# Patient Record
Sex: FEMALE | Race: WHITE | NOT HISPANIC OR LATINO | Employment: OTHER | URBAN - METROPOLITAN AREA
[De-identification: names, ages, dates, MRNs, and addresses within clinical notes are randomized per-mention and may not be internally consistent; named-entity substitution may affect disease eponyms.]

---

## 2017-01-03 RX ORDER — FENOFIBRATE 160 MG/1
160 TABLET ORAL DAILY
Qty: 90 TABLET | Refills: 3 | Status: SHIPPED | OUTPATIENT
Start: 2017-01-03 | End: 2018-01-24 | Stop reason: SDUPTHER

## 2017-01-05 ENCOUNTER — APPOINTMENT (OUTPATIENT)
Dept: MAMMOGRAPHY | Facility: HOSPITAL | Age: 72
End: 2017-01-05

## 2017-01-12 ENCOUNTER — HOSPITAL ENCOUNTER (OUTPATIENT)
Dept: MAMMOGRAPHY | Facility: HOSPITAL | Age: 72
Discharge: HOME | End: 2017-01-12
Admitting: FAMILY MEDICINE

## 2017-01-12 ENCOUNTER — TELEPHONE (OUTPATIENT)
Dept: FAMILY MEDICINE CLINIC | Facility: CLINIC | Age: 72
End: 2017-01-12

## 2017-01-12 DIAGNOSIS — E03.9 ADULT HYPOTHYROIDISM: Primary | ICD-10-CM

## 2017-01-12 DIAGNOSIS — Z12.31 VISIT FOR SCREENING MAMMOGRAM: ICD-10-CM

## 2017-01-12 PROCEDURE — G0202 SCR MAMMO BI INCL CAD: HCPCS

## 2017-01-12 NOTE — TELEPHONE ENCOUNTER
Pt called and said that you wanted her to come in to get her tsh rechecked cause it was elevated last time. Just let me know when you put order in and ill call pt. Thank you

## 2017-01-13 ENCOUNTER — RESULTS ENCOUNTER (OUTPATIENT)
Dept: FAMILY MEDICINE CLINIC | Facility: CLINIC | Age: 72
End: 2017-01-13

## 2017-01-13 DIAGNOSIS — E03.9 ADULT HYPOTHYROIDISM: ICD-10-CM

## 2017-01-18 LAB — TSH SERPL DL<=0.005 MIU/L-ACNC: 1.31 MIU/ML (ref 0.27–4.2)

## 2017-01-23 DIAGNOSIS — F41.9 ANXIETY: ICD-10-CM

## 2017-01-23 RX ORDER — ALPRAZOLAM 0.5 MG/1
TABLET ORAL
Qty: 90 TABLET | Refills: 0 | Status: SHIPPED | OUTPATIENT
Start: 2017-01-23 | End: 2017-02-22 | Stop reason: SDUPTHER

## 2017-02-22 DIAGNOSIS — F41.9 ANXIETY: ICD-10-CM

## 2017-02-22 DIAGNOSIS — F51.01 PRIMARY INSOMNIA: ICD-10-CM

## 2017-02-22 RX ORDER — ALPRAZOLAM 0.5 MG/1
TABLET ORAL
Qty: 90 TABLET | Refills: 0 | OUTPATIENT
Start: 2017-02-22 | End: 2017-03-23 | Stop reason: SDUPTHER

## 2017-02-22 RX ORDER — ZOLPIDEM TARTRATE 10 MG/1
TABLET ORAL
Qty: 90 TABLET | Refills: 0 | OUTPATIENT
Start: 2017-02-22 | End: 2017-05-22 | Stop reason: SDUPTHER

## 2017-03-06 DIAGNOSIS — E03.9 ADULT HYPOTHYROIDISM: ICD-10-CM

## 2017-03-06 RX ORDER — LEVOTHYROXINE SODIUM 0.07 MG/1
75 TABLET ORAL DAILY
Qty: 90 TABLET | Refills: 3 | Status: SHIPPED | OUTPATIENT
Start: 2017-03-06 | End: 2018-03-20 | Stop reason: SDUPTHER

## 2017-03-10 RX ORDER — ESTRADIOL 0.5 MG/1
TABLET ORAL
Qty: 90 TABLET | Refills: 0 | Status: SHIPPED | OUTPATIENT
Start: 2017-03-10 | End: 2017-06-17 | Stop reason: SDUPTHER

## 2017-03-23 DIAGNOSIS — F41.9 ANXIETY: ICD-10-CM

## 2017-03-23 RX ORDER — ALPRAZOLAM 0.5 MG/1
TABLET ORAL
Qty: 90 TABLET | Refills: 0 | OUTPATIENT
Start: 2017-03-23 | End: 2017-04-21 | Stop reason: SDUPTHER

## 2017-03-29 RX ORDER — PANTOPRAZOLE SODIUM 40 MG/1
40 TABLET, DELAYED RELEASE ORAL 2 TIMES DAILY
Qty: 180 TABLET | Refills: 0 | Status: SHIPPED | OUTPATIENT
Start: 2017-03-29 | End: 2017-07-06 | Stop reason: SDUPTHER

## 2017-04-21 DIAGNOSIS — F41.9 ANXIETY: ICD-10-CM

## 2017-04-21 RX ORDER — ALPRAZOLAM 0.5 MG/1
TABLET ORAL
Qty: 90 TABLET | Refills: 0 | OUTPATIENT
Start: 2017-04-21 | End: 2017-05-22 | Stop reason: SDUPTHER

## 2017-05-22 DIAGNOSIS — F41.9 ANXIETY: ICD-10-CM

## 2017-05-22 DIAGNOSIS — F51.01 PRIMARY INSOMNIA: ICD-10-CM

## 2017-05-22 RX ORDER — ALPRAZOLAM 0.5 MG/1
TABLET ORAL
Qty: 90 TABLET | Refills: 0 | OUTPATIENT
Start: 2017-05-22 | End: 2017-06-22 | Stop reason: SDUPTHER

## 2017-05-22 RX ORDER — ZOLPIDEM TARTRATE 10 MG/1
TABLET ORAL
Qty: 90 TABLET | Refills: 0 | OUTPATIENT
Start: 2017-05-22 | End: 2017-08-21 | Stop reason: SDUPTHER

## 2017-06-19 RX ORDER — ESTRADIOL 0.5 MG/1
TABLET ORAL
Qty: 90 TABLET | Refills: 1 | Status: SHIPPED | OUTPATIENT
Start: 2017-06-19 | End: 2017-11-27 | Stop reason: SDUPTHER

## 2017-06-22 ENCOUNTER — RESULTS ENCOUNTER (OUTPATIENT)
Dept: FAMILY MEDICINE CLINIC | Facility: CLINIC | Age: 72
End: 2017-06-22

## 2017-06-22 ENCOUNTER — OFFICE VISIT (OUTPATIENT)
Dept: FAMILY MEDICINE CLINIC | Facility: CLINIC | Age: 72
End: 2017-06-22

## 2017-06-22 VITALS
WEIGHT: 128 LBS | BODY MASS INDEX: 23.55 KG/M2 | SYSTOLIC BLOOD PRESSURE: 110 MMHG | TEMPERATURE: 97.4 F | HEART RATE: 67 BPM | DIASTOLIC BLOOD PRESSURE: 65 MMHG | OXYGEN SATURATION: 97 % | HEIGHT: 62 IN

## 2017-06-22 DIAGNOSIS — E03.9 ADULT HYPOTHYROIDISM: ICD-10-CM

## 2017-06-22 DIAGNOSIS — E78.5 HYPERLIPIDEMIA, UNSPECIFIED HYPERLIPIDEMIA TYPE: Primary | ICD-10-CM

## 2017-06-22 DIAGNOSIS — F41.9 ANXIETY: ICD-10-CM

## 2017-06-22 DIAGNOSIS — E55.9 VITAMIN D DEFICIENCY: ICD-10-CM

## 2017-06-22 DIAGNOSIS — Z12.11 COLON CANCER SCREENING: ICD-10-CM

## 2017-06-22 LAB
25(OH)D3+25(OH)D2 SERPL-MCNC: 30.7 NG/ML (ref 30–100)
ALBUMIN SERPL-MCNC: 4.6 G/DL (ref 3.5–5.2)
ALBUMIN/GLOB SERPL: 2.1 G/DL
ALP SERPL-CCNC: 29 U/L (ref 39–117)
ALT SERPL-CCNC: 15 U/L (ref 1–33)
AST SERPL-CCNC: 20 U/L (ref 1–32)
BILIRUB SERPL-MCNC: 0.4 MG/DL (ref 0.1–1.2)
BUN SERPL-MCNC: 24 MG/DL (ref 8–23)
BUN/CREAT SERPL: 26.4 (ref 7–25)
CALCIUM SERPL-MCNC: 10.3 MG/DL (ref 8.6–10.5)
CHLORIDE SERPL-SCNC: 100 MMOL/L (ref 98–107)
CHOLEST SERPL-MCNC: 135 MG/DL (ref 0–200)
CO2 SERPL-SCNC: 24.2 MMOL/L (ref 22–29)
CREAT SERPL-MCNC: 0.91 MG/DL (ref 0.57–1)
GLOBULIN SER CALC-MCNC: 2.2 GM/DL
GLUCOSE SERPL-MCNC: 81 MG/DL (ref 65–99)
HDLC SERPL-MCNC: 81 MG/DL (ref 40–60)
LDLC SERPL CALC-MCNC: 39 MG/DL (ref 0–100)
POTASSIUM SERPL-SCNC: 4.5 MMOL/L (ref 3.5–5.2)
PROT SERPL-MCNC: 6.8 G/DL (ref 6–8.5)
SODIUM SERPL-SCNC: 139 MMOL/L (ref 136–145)
TRIGL SERPL-MCNC: 73 MG/DL (ref 0–150)
TSH SERPL DL<=0.005 MIU/L-ACNC: 1.18 MIU/ML (ref 0.27–4.2)
VLDLC SERPL CALC-MCNC: 14.6 MG/DL (ref 5–40)

## 2017-06-22 PROCEDURE — 99213 OFFICE O/P EST LOW 20 MIN: CPT | Performed by: FAMILY MEDICINE

## 2017-06-22 RX ORDER — ALPRAZOLAM 0.5 MG/1
TABLET ORAL
Qty: 90 TABLET | Refills: 0 | OUTPATIENT
Start: 2017-06-22 | End: 2017-07-21 | Stop reason: SDUPTHER

## 2017-06-22 NOTE — PROGRESS NOTES
Subjective   Lou Shaffer is a 71 y.o. female. CC: hypertension    History of Present Illness   Lou is a 71 year old female who comes in today for blood pressure check.  Her insurance is questioning the use of Estradiol because it is a high risk medication.  Discussed potential adverse side effects including cancer, stroke, blood clots.  She states that she takes it for hot flashes.  Agreeable to try getting off of it.  Advised to start weaning off of it by going to every other day and then every third day.  She is due to have her labs checked and is fasting so will do those today while she is here.  Would like to do the Cologuard testing for colon cancer screening.  Requests refill of Xanax which she takes for chronic anxiety.        The following portions of the patient's history were reviewed and updated as appropriate: allergies, current medications, past medical history, past social history, past surgical history and problem list.    Review of Systems   Constitutional: Negative.  Negative for fatigue and unexpected weight change.   HENT: Negative.  Negative for congestion, sinus pressure and trouble swallowing.    Respiratory: Negative.  Negative for cough, shortness of breath and wheezing.    Cardiovascular: Negative.  Negative for chest pain, palpitations and leg swelling.   Gastrointestinal: Negative.  Negative for abdominal pain, blood in stool, constipation, diarrhea, nausea and vomiting.   Genitourinary: Negative.  Negative for difficulty urinating and hematuria.   Musculoskeletal: Negative.  Negative for arthralgias and back pain.   Skin: Negative.  Negative for rash.   Neurological: Negative.  Negative for dizziness, light-headedness and headaches.   Psychiatric/Behavioral: Negative for dysphoric mood and sleep disturbance. The patient is nervous/anxious.        Objective   Physical Exam   Constitutional: She is oriented to person, place, and time. She appears well-developed and  well-nourished.   Neck: No thyromegaly present.   Cardiovascular: Normal rate, regular rhythm and normal heart sounds.    No murmur heard.  Pulmonary/Chest: Effort normal and breath sounds normal. No respiratory distress.   Lymphadenopathy:     She has no cervical adenopathy.   Neurological: She is alert and oriented to person, place, and time.   Skin: Skin is warm and dry. No rash noted.   Psychiatric: She has a normal mood and affect. Her behavior is normal.   Nursing note and vitals reviewed.    Vitals:    06/22/17 1104   BP: 110/65   Pulse: 67   Temp: 97.4 °F (36.3 °C)   SpO2: 97%     Assessment/Plan   Problems Addressed this Visit        Cardiovascular and Mediastinum    Hyperlipidemia - Primary    Relevant Orders    Comprehensive metabolic panel    Lipid panel       Digestive    Vitamin D deficiency    Relevant Orders    Vitamin D 25 Hydroxy       Endocrine    Adult hypothyroidism    Relevant Orders    TSH       Other    Anxiety      Other Visit Diagnoses     Colon cancer screening        Relevant Orders    Cologuard        Will call in the [facility] refill.    The patient has read and signed the [facility] griddig Substance Contract.  I will continue to see patient for regular follow up appointments.  They are well controlled on their medication.  IVORY is updated every 3 months. The patient is aware of the potential for addiction and dependence.    The patient has read and signed the [facility] Controlled Substance Contract.  I will continue to see patient for regular follow up appointments.  They are well controlled on their medication.  IVORY is updated every 3 months. The patient is aware of the potential for addiction. The patient has read and signed the [facility] Controlled Substance Contract.  I will continue to see patient for regular follow up appointments.  They are well controlled on their medication.  IVORY is updated every 3 months. The patient is aware of the potential for  addiction and dependence.on and dependence.

## 2017-07-06 RX ORDER — PANTOPRAZOLE SODIUM 40 MG/1
TABLET, DELAYED RELEASE ORAL
Qty: 180 TABLET | Refills: 3 | Status: SHIPPED | OUTPATIENT
Start: 2017-07-06 | End: 2018-08-10 | Stop reason: SDUPTHER

## 2017-07-21 DIAGNOSIS — F41.9 ANXIETY: ICD-10-CM

## 2017-07-21 RX ORDER — ALPRAZOLAM 0.5 MG/1
0.5 TABLET ORAL 3 TIMES DAILY PRN
Qty: 90 TABLET | Refills: 0 | OUTPATIENT
Start: 2017-07-21 | End: 2017-08-23 | Stop reason: SDUPTHER

## 2017-07-24 DIAGNOSIS — Z12.11 COLON CANCER SCREENING: Primary | ICD-10-CM

## 2017-08-09 DIAGNOSIS — I10 ESSENTIAL HYPERTENSION: ICD-10-CM

## 2017-08-09 RX ORDER — BENAZEPRIL HYDROCHLORIDE AND HYDROCHLOROTHIAZIDE 5; 6.25 MG/1; MG/1
TABLET ORAL
Qty: 90 TABLET | Refills: 2 | Status: SHIPPED | OUTPATIENT
Start: 2017-08-09 | End: 2018-06-28 | Stop reason: SDUPTHER

## 2017-08-21 DIAGNOSIS — F51.01 PRIMARY INSOMNIA: ICD-10-CM

## 2017-08-21 RX ORDER — ZOLPIDEM TARTRATE 10 MG/1
10 TABLET ORAL NIGHTLY PRN
Qty: 90 TABLET | Refills: 0 | OUTPATIENT
Start: 2017-08-21 | End: 2017-11-20 | Stop reason: SDUPTHER

## 2017-08-23 ENCOUNTER — TELEPHONE (OUTPATIENT)
Dept: FAMILY MEDICINE CLINIC | Facility: CLINIC | Age: 72
End: 2017-08-23

## 2017-08-23 DIAGNOSIS — F41.9 ANXIETY: ICD-10-CM

## 2017-08-23 RX ORDER — ALPRAZOLAM 0.5 MG/1
0.5 TABLET ORAL 3 TIMES DAILY PRN
Qty: 90 TABLET | Refills: 0 | OUTPATIENT
Start: 2017-08-23 | End: 2018-01-03 | Stop reason: SDUPTHER

## 2017-08-23 NOTE — TELEPHONE ENCOUNTER
Ok---ignore other message about sending the medication in and give same directions for weaning off

## 2017-08-23 NOTE — TELEPHONE ENCOUNTER
Informed pt. Pt said that she will try the buspar. Does she still need to be wean off the xanax then go to the buspar?

## 2017-08-23 NOTE — TELEPHONE ENCOUNTER
Please call her and let her know that I did refill her Xanax.  However we need to discuss this medication.  Please tell her that advisory has come out against patients taking this medication multiple times a day every day.  We are going to need to wean her off of it or refer her to psychiatry for continued treatment of anxiety.  Also I will be retiring and it is very doubtful that another primary care doctor will continue to prescribe this medication.    I can give directions on how to wean or refer.  If wants something else for anxiety the only thing I can prescribe is Buspar

## 2017-08-23 NOTE — TELEPHONE ENCOUNTER
Yes she will.  Go ahead and cut down to two a day at this time and do that for 7 days.  She can then cut down to one a day for 2 weeks and then go to every other day.  Can go ahead and start on the Buspar now so it will have kicked in by the time she is off the Xanax.  Send back to me after you talk to her and I will send it in

## 2017-11-20 DIAGNOSIS — F51.01 PRIMARY INSOMNIA: ICD-10-CM

## 2017-11-20 RX ORDER — ZOLPIDEM TARTRATE 10 MG/1
10 TABLET ORAL NIGHTLY PRN
Qty: 90 TABLET | Refills: 0 | OUTPATIENT
Start: 2017-11-20 | End: 2018-05-18 | Stop reason: SDUPTHER

## 2017-11-27 ENCOUNTER — TELEPHONE (OUTPATIENT)
Dept: FAMILY MEDICINE CLINIC | Facility: CLINIC | Age: 72
End: 2017-11-27

## 2017-11-27 RX ORDER — ESTRADIOL 0.5 MG/1
0.5 TABLET ORAL DAILY
Qty: 90 TABLET | Refills: 0 | Status: SHIPPED | OUTPATIENT
Start: 2017-11-27 | End: 2018-01-03

## 2017-11-27 NOTE — TELEPHONE ENCOUNTER
Pt called wanting to know if there is anything other than estradiol (insurance wont cover) that pt can take for hot flashes?

## 2017-11-27 NOTE — TELEPHONE ENCOUNTER
She could try OTC supplements that some patients feel are helpful.  She might ask her pharmacist about supplements.

## 2018-01-03 ENCOUNTER — OFFICE VISIT (OUTPATIENT)
Dept: FAMILY MEDICINE CLINIC | Facility: CLINIC | Age: 73
End: 2018-01-03

## 2018-01-03 VITALS
HEIGHT: 62 IN | SYSTOLIC BLOOD PRESSURE: 112 MMHG | HEART RATE: 68 BPM | DIASTOLIC BLOOD PRESSURE: 70 MMHG | WEIGHT: 128 LBS | TEMPERATURE: 97.2 F | OXYGEN SATURATION: 99 % | BODY MASS INDEX: 23.55 KG/M2

## 2018-01-03 DIAGNOSIS — R23.2 HOT FLASHES: ICD-10-CM

## 2018-01-03 DIAGNOSIS — E03.9 ADULT HYPOTHYROIDISM: ICD-10-CM

## 2018-01-03 DIAGNOSIS — Z12.11 SCREENING FOR COLON CANCER: ICD-10-CM

## 2018-01-03 DIAGNOSIS — F41.9 ANXIETY: Primary | ICD-10-CM

## 2018-01-03 DIAGNOSIS — R61 NIGHT SWEATS: ICD-10-CM

## 2018-01-03 PROCEDURE — 99214 OFFICE O/P EST MOD 30 MIN: CPT | Performed by: FAMILY MEDICINE

## 2018-01-03 RX ORDER — OFLOXACIN 3 MG/ML
SOLUTION/ DROPS OPHTHALMIC
COMMUNITY
Start: 2017-11-29 | End: 2018-01-03

## 2018-01-03 RX ORDER — ALPRAZOLAM 0.5 MG/1
0.5 TABLET ORAL DAILY PRN
Qty: 30 TABLET | Refills: 0 | Status: SHIPPED | OUTPATIENT
Start: 2018-01-03 | End: 2018-03-09 | Stop reason: SDUPTHER

## 2018-01-03 RX ORDER — PAROXETINE 10 MG/1
10 TABLET, FILM COATED ORAL EVERY MORNING
Qty: 90 TABLET | Refills: 0 | Status: SHIPPED | OUTPATIENT
Start: 2018-01-03 | End: 2018-02-02 | Stop reason: SINTOL

## 2018-01-03 NOTE — PROGRESS NOTES
Subjective   Lou Shaffer is a 72 y.o. female.  He and his establish in the office with Dr. Smith but is new to me.  She is here to discuss hot flashes and anxiety.    Chief Complaint   Patient presents with   • Night Sweats     would like to start back on estridol   • Med Refill     would like a refill on xanax        History of Present Illness    Night sweats/hot flashes  Have recurred and are intense and frequent since August 2017 after discontinuing her estradiol daily tablet in July 2017. She did not wean just threw them away. This was because she received a letter from her insurance company that reported they were high risk medication. After discussing risks with prior PCP she decided not to take them anymore. However because of the intensity and frequency of the hot flashes and sweating she would like to restart this medication. Reported that she has tingling in her face and then gets hot flash and red face. She does overall well when she is moving around but as soon as she sits still she gets the hot flash. Sweating over night, has to pull off covers. Uncomfortable. No cough, no hemoptysis. No weight loss.     Anxiety  Has been having more trouble with her anxiety since pt was given last script in August 2017 and it was recommended that she wean this medication down from 3 times daily. Was recommended against and also told that she would be unlikely to find another doctor who would give her this kind of medication TID. Pt was trying to take twice daily but has since been breaking it and taking pieces prn. She did take it last night. For refill could get by with twice daily dosing. Has always had bad anxiety. Has not tried other medications. Has not done counseling. Explains a lot of her anxiety comes from not being able to see well. Has glaucoma and has had surgery. Been seeing just streak and since surgery is able to see so much more, still has recovery but so far going well.    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 She has been weaning from hydrocodone and is down to one tablet daily. She has not well with epidural injections and is now getting trigger point injections which she reports has helped her stay mobile. She remains physically active with working around the house and exercise.    Pt had positive cologuard test but did not follow up with the colonoscopy because she thought the cologuard tested for blood and on her colonoscopy in 2010 they saw hemorrhoids so she just thought it was the same thing.    The following portions of the patient's history were reviewed and updated as appropriate: allergies, current medications, past family history, past medical history, past social history, past surgical history and problem list.      Review of Systems   Constitutional: Negative for activity change, appetite change and unexpected weight change.   Eyes: Positive for visual disturbance. Negative for pain and redness.   Gastrointestinal: Negative for abdominal pain, blood in stool, constipation and diarrhea.   Musculoskeletal: Positive for back pain and neck pain.   Psychiatric/Behavioral: The patient is nervous/anxious.        Objective   Blood pressure 112/70, pulse 68, temperature 97.2 °F (36.2 °C), temperature source Oral, height 157.5 cm (62\"), weight 58.1 kg (128 lb), SpO2 99 %, not currently breastfeeding.  Physical Exam   Constitutional: She is oriented to person, place, and time. She appears well-nourished. No distress.   Eyes: Right eye exhibits no discharge. Left eye exhibits no discharge.   Neck: Neck supple. No thyromegaly present.   Cardiovascular: Normal rate, regular rhythm and normal heart sounds.  Exam reveals no gallop and no friction rub.    No murmur heard.  Pulmonary/Chest: Effort normal and breath sounds normal. No respiratory distress. She has no wheezes. She has no rales.   Lymphadenopathy:     She has no cervical adenopathy. "   Neurological: She is alert and oriented to person, place, and time.   Psychiatric: She has a normal mood and affect. Her behavior is normal.   Vitals reviewed.      Assessment/Plan   Lou was seen today for night sweats and med refill.    Diagnoses and all orders for this visit:    Anxiety  -     ALPRAZolam (XANAX) 0.5 MG tablet; Take 1 tablet by mouth Daily As Needed for Anxiety.  -     TSH Rfx On Abnormal To Free T4    Adult hypothyroidism  -     TSH Rfx On Abnormal To Free T4    Night sweats  -     TSH Rfx On Abnormal To Free T4    Hot flashes  -     TSH Rfx On Abnormal To Free T4    Screening for colon cancer  -     Ambulatory Referral For Screening Colonoscopy    Other orders  -     PARoxetine (PAXIL) 10 MG tablet; Take 1 tablet by mouth Every Morning.      Discussed considering using low dose paroxetine for her anxiety and hot flashes  Will do another prescription of the alprazolam for 30 tablets for one month to help her bridge to the SSRI, encouraged her to take paroxetine every day, duration of onset of benefit from medication, importance of close follow up discussed. Also encouraged pt to consider counseling but she did not want to pursue at this time    Counseled on the recommendation of having a follow up colonoscopy for her positive cologuard test. This is not a blood test but a DNA test and given it was positive and her most recent C-scope was 2010 we will order C-scope. Pt was agreeable.

## 2018-01-19 ENCOUNTER — PREP FOR SURGERY (OUTPATIENT)
Dept: OTHER | Facility: HOSPITAL | Age: 73
End: 2018-01-19

## 2018-01-19 DIAGNOSIS — R19.5 OCCULT BLOOD POSITIVE STOOL: Primary | ICD-10-CM

## 2018-01-19 DIAGNOSIS — Z83.71 FAMILY HISTORY OF POLYPS IN THE COLON: ICD-10-CM

## 2018-01-24 RX ORDER — FENOFIBRATE 160 MG/1
160 TABLET ORAL DAILY
Qty: 90 TABLET | Refills: 1 | Status: SHIPPED | OUTPATIENT
Start: 2018-01-24 | End: 2018-08-25 | Stop reason: SDUPTHER

## 2018-02-02 ENCOUNTER — OFFICE VISIT (OUTPATIENT)
Dept: FAMILY MEDICINE CLINIC | Facility: CLINIC | Age: 73
End: 2018-02-02

## 2018-02-02 VITALS
HEIGHT: 62 IN | SYSTOLIC BLOOD PRESSURE: 118 MMHG | HEART RATE: 70 BPM | BODY MASS INDEX: 23.92 KG/M2 | OXYGEN SATURATION: 91 % | WEIGHT: 130 LBS | DIASTOLIC BLOOD PRESSURE: 70 MMHG

## 2018-02-02 DIAGNOSIS — E61.1 IRON DEFICIENCY: ICD-10-CM

## 2018-02-02 DIAGNOSIS — E03.9 ADULT HYPOTHYROIDISM: Primary | ICD-10-CM

## 2018-02-02 LAB
IRON SERPL-MCNC: 58 MCG/DL (ref 37–145)
TSH SERPL DL<=0.005 MIU/L-ACNC: 3.02 MIU/ML (ref 0.27–4.2)

## 2018-02-02 PROCEDURE — 99214 OFFICE O/P EST MOD 30 MIN: CPT | Performed by: FAMILY MEDICINE

## 2018-02-02 NOTE — PROGRESS NOTES
"Subjective   Lou Shaffer is a 72 y.o. female.     Chief Complaint   Patient presents with   • Follow-up     doesnt like the paxil and the way it makes her feel        History of Present Illness  Anxiety  Did not abhijit paroxetine. Felt weird. Bouncing off the walls, could not sleep. Stopped taking. But also taking significantly less of the xanax. Only when she absolutely needs it. Describes herself now more as \"excited\" and trying to be aware of not treating that because that is more often what it is. Not negative or a bad thing like true anxiety.    Getting off hydrocodone, has been taking 1/2 tablet 15 minutes before she gets out of bed, not going back for script, getting off of it. Doing yoga every day and getting her balance. Has been doing long time but now doing very regularly. Wondering about taking iron. Takes Feosol daily. No hx of anemia, eats lots of meat. No CBC on her chart.    Drove herself today. Went well just can't see red lights but there are no lights from here to her house,  offered to drive her but she wanted to do it. Goes back to eye doctor in 3/2018 to get stiches out.     Hot flashes  Started taking estroven, noted a little difference. Cheeks feel they got needles and gets sweaty. Had hysterectomy with BSO in 2000, had enlarged ovary.    The following portions of the patient's history were reviewed and updated as appropriate: allergies, current medications, past surgical history and problem list.    Review of Systems   Constitutional: Positive for activity change. Negative for appetite change and unexpected weight change.   Eyes: Positive for visual disturbance.   Psychiatric/Behavioral: Positive for sleep disturbance. The patient is nervous/anxious.        Objective   Blood pressure 118/70, pulse 70, height 157.5 cm (62\"), weight 59 kg (130 lb), SpO2 91 %, not currently breastfeeding.  Physical Exam   Constitutional: She is oriented to person, place, and time. She appears " well-nourished. No distress.   Eyes: Conjunctivae are normal. Right eye exhibits no discharge. Left eye exhibits no discharge.   Right eye with superiolateral areas of blue coloration, the other portions are blue and left eye is blue, pupils are 5mm bilaterally   Pulmonary/Chest: Effort normal. No respiratory distress.   Neurological: She is alert and oriented to person, place, and time.   Psychiatric: She has a normal mood and affect. Her behavior is normal.   Vitals reviewed.      Assessment/Plan   Lou was seen today for follow-up.    Diagnoses and all orders for this visit:    Adult hypothyroidism  -     TSH Rfx On Abnormal To Free T4    Iron deficiency  -     Iron level    iron depletion common in older population but will check level to make sure she does not have risk of elevated iron    Pt going off hydrocodone, not returning to pain, will do yoga, would like to continue xanax on prn basis

## 2018-02-05 ENCOUNTER — TELEPHONE (OUTPATIENT)
Dept: FAMILY MEDICINE CLINIC | Facility: CLINIC | Age: 73
End: 2018-02-05

## 2018-02-26 ENCOUNTER — OUTSIDE FACILITY SERVICE (OUTPATIENT)
Dept: SURGERY | Facility: CLINIC | Age: 73
End: 2018-02-26

## 2018-02-26 PROCEDURE — 45378 DIAGNOSTIC COLONOSCOPY: CPT | Performed by: SURGERY

## 2018-03-09 DIAGNOSIS — F41.9 ANXIETY: ICD-10-CM

## 2018-03-16 RX ORDER — ALPRAZOLAM 0.5 MG/1
TABLET ORAL
Qty: 30 TABLET | Refills: 0 | OUTPATIENT
Start: 2018-03-16 | End: 2018-05-18 | Stop reason: SDUPTHER

## 2018-03-19 ENCOUNTER — TELEPHONE (OUTPATIENT)
Dept: FAMILY MEDICINE CLINIC | Facility: CLINIC | Age: 73
End: 2018-03-19

## 2018-03-19 NOTE — TELEPHONE ENCOUNTER
Patient call requesting estridol for her hot flashes    I do not prescribe hormones. She can ask her gynecologist or she can make an appointment to discuss alternative.

## 2018-03-20 DIAGNOSIS — E03.9 ADULT HYPOTHYROIDISM: ICD-10-CM

## 2018-03-20 RX ORDER — LEVOTHYROXINE SODIUM 0.07 MG/1
75 TABLET ORAL DAILY
Qty: 90 TABLET | Refills: 1 | Status: SHIPPED | OUTPATIENT
Start: 2018-03-20 | End: 2018-03-20 | Stop reason: SDUPTHER

## 2018-03-20 RX ORDER — LEVOTHYROXINE SODIUM 0.07 MG/1
75 TABLET ORAL DAILY
Qty: 10 TABLET | Refills: 0 | Status: SHIPPED | OUTPATIENT
Start: 2018-03-20 | End: 2019-04-11 | Stop reason: SDUPTHER

## 2018-04-10 ENCOUNTER — OFFICE VISIT (OUTPATIENT)
Dept: FAMILY MEDICINE CLINIC | Facility: CLINIC | Age: 73
End: 2018-04-10

## 2018-04-10 VITALS
OXYGEN SATURATION: 98 % | BODY MASS INDEX: 23.77 KG/M2 | SYSTOLIC BLOOD PRESSURE: 118 MMHG | WEIGHT: 129.2 LBS | HEIGHT: 62 IN | TEMPERATURE: 97.6 F | DIASTOLIC BLOOD PRESSURE: 86 MMHG | HEART RATE: 78 BPM

## 2018-04-10 DIAGNOSIS — M65.312 TRIGGER THUMB OF BOTH THUMBS: ICD-10-CM

## 2018-04-10 DIAGNOSIS — M65.311 TRIGGER THUMB OF BOTH THUMBS: ICD-10-CM

## 2018-04-10 DIAGNOSIS — H69.80 DYSFUNCTION OF EUSTACHIAN TUBE, UNSPECIFIED LATERALITY: ICD-10-CM

## 2018-04-10 DIAGNOSIS — N95.1 HOT FLASHES DUE TO MENOPAUSE: Primary | ICD-10-CM

## 2018-04-10 PROCEDURE — 99214 OFFICE O/P EST MOD 30 MIN: CPT | Performed by: NURSE PRACTITIONER

## 2018-04-10 RX ORDER — FLUTICASONE PROPIONATE 50 MCG
2 SPRAY, SUSPENSION (ML) NASAL DAILY
Qty: 1 BOTTLE | Refills: 1 | Status: SHIPPED | OUTPATIENT
Start: 2018-04-10 | End: 2018-06-11

## 2018-04-10 RX ORDER — ESTRADIOL 0.5 MG/1
0.5 TABLET ORAL DAILY
Qty: 90 TABLET | Refills: 1 | Status: SHIPPED | OUTPATIENT
Start: 2018-04-10 | End: 2018-06-11

## 2018-04-10 NOTE — PATIENT INSTRUCTIONS
Discharge instructions  May resume estradiol  Follow-up with Dr. Rico  Every 6 months or per her instructions  Discuss this medication with her next visit risk and benefit    If you have increased from difficulties call for referral to hand specialty          If dizziness is severe or severe headache weakness slurred speech emergency room    If recurrent dizziness recommend see an ENT for an evaluation  As well as you might benefit from  Vestibular rehabilitation??    Thank You,      James Epley, NP

## 2018-04-12 PROBLEM — N95.1 HOT FLASHES DUE TO MENOPAUSE: Status: ACTIVE | Noted: 2018-04-12

## 2018-04-13 NOTE — PROGRESS NOTES
Subjective   Lou Shaffer is a 72 y.o. female.     Patient complains of mild depression discomfort and some sinus pressure of the last several weeks  No vertigo headaches vision loss slurred speech  Has Flonase, not presently using  Just wants to make sure her ears are okay    She wants to resume her estradiol  Apparently stopped at several months ago, and since then she's been having hot flashes  With some mild dizziness without associated symptoms.  No palpitations chest pain shortness of breath or so should nausea vomiting  No paresthesias or other complaints.   no one explained weight loss.    She feels better overall increase quality of life as well as her skin feels better when taking the estradiol  And she feels like it's getting looser and causing her breast sag.    She does not smoke no history of clotting disorders deep vein thrombosis, Pulmonaryemboli or any family history of the latter  She has no malignancy or other contraindications to hormone therapy    She was unable to get in to see Dr. Rico her PCP for several months and she was wishing to go ahead and resume prior.      Less of an issue she has some clicking the thumbs bilateral and sometimes feels like they get stuck  Minor problem  A little increased in the mornings  Has no pain presently  No metacarpal pain or swelling  And no injury      Dizziness   Associated symptoms include diaphoresis. Pertinent negatives include no chest pain, fatigue, fever, headaches, numbness or weakness.   Hand Pain    Pertinent negatives include no chest pain or numbness.        The following portions of the patient's history were reviewed and updated as appropriate: allergies, current medications, past family history, past social history, past surgical history and problem list.    Review of Systems   Constitutional: Positive for diaphoresis. Negative for activity change, appetite change, fatigue, fever and unexpected weight loss.   HENT: Negative.     Respiratory: Negative.  Negative for shortness of breath.    Cardiovascular: Negative for chest pain.   Musculoskeletal:        Thumb discomfort   Neurological: Positive for dizziness. Negative for tremors, seizures, syncope, facial asymmetry, speech difficulty, weakness, light-headedness, numbness, headaches, memory problem and confusion.   Psychiatric/Behavioral: Negative.        Objective   Physical Exam   Constitutional: She is oriented to person, place, and time. She appears well-developed.   Pleasant appears well younger than stated age   HENT:   Mouth/Throat: Oropharynx is clear and moist.   TMs a little dull without fluid level no bulging canals clear no foreign bodiesor masses  Turbinates congested clear rhinitis   Eyes: Conjunctivae are normal. Pupils are equal, round, and reactive to light.   Cardiovascular: Normal rate, regular rhythm and normal heart sounds.    Carotids clear   Pulmonary/Chest: Effort normal and breath sounds normal.   Musculoskeletal: She exhibits tenderness.   Normal range of motion bilateral thumbs  With occasional click flexor tendon  No palpable cyst  No deformities  Minimal joint enlargement consistent with age arthritis  No ulnar deformities or metacarpal reform is otherwise     Lymphadenopathy:     She has no cervical adenopathy.   Neurological: She is alert and oriented to person, place, and time.   Skin: Skin is warm and dry.   Psychiatric: She has a normal mood and affect. Her behavior is normal. Thought content normal.   Vitals reviewed.        Assessment/Plan   Lou was seen today for left inner ear infection, dizziness and hand pain.    Diagnoses and all orders for this visit:    Hot flashes due to menopause    Dysfunction of Eustachian tube, unspecified laterality    Trigger thumb of both thumbs    Other orders  -     estradiol (ESTRACE) 0.5 MG tablet; Take 1 tablet by mouth Daily. For postmenopausal syndrome  -     fluticasone (FLONASE) 50 MCG/ACT nasal spray; 2  sprays into each nostril Daily.                Risk and benefit of estrogen  Increase risk of stroke or heart attack blood clots pulmonary emboli and death  Possible increase in risk of malignancy  Breast cancers  Discuss benefit as well as  Indication treat hot flashes lowest effective dose shortest time possible    It would be reasonable to resume estrogen but patient needs to follow-up with Dr. Rico to discuss risk and benefit again and to make sure this is in her best interest  Patient is willing and she was given limited refills    Resume Flonase eustachian tube dysfunction  Recheck a follow-up EMT for any chronic ear complaints or hearing loss    She's describing trigger finger or trigger thumb mild occasional  Essentially normal exam occasional click Flexor tendon    Offered hand surgery referral she declines I think this would be appropriate  However if she has any increase frequency  Pain or decreased range of motion or locking she would need to see hand surgery for evaluation treatment  Emergency room for chest pain shortness of breath severe headaches Or dizziness    Discharge instructions  May resume estradiol  Follow-up with Dr. Rico  Every 6 months or per her instructions  Discuss this medication with her next visit risk and benefit    If you have increased from difficulties call for referral to hand specialty          If dizziness is severe or severe headache weakness slurred speech emergency room    If recurrent dizziness recommend see an ENT for an evaluation  As well as you might benefit from  Vestibular rehabilitation??    Thank You,      James Epley, NP

## 2018-05-16 DIAGNOSIS — F51.01 PRIMARY INSOMNIA: ICD-10-CM

## 2018-05-16 RX ORDER — ZOLPIDEM TARTRATE 10 MG/1
TABLET ORAL
Qty: 90 TABLET | Refills: 0 | Status: CANCELLED | OUTPATIENT
Start: 2018-05-16

## 2018-05-18 DIAGNOSIS — F41.9 ANXIETY: ICD-10-CM

## 2018-05-18 DIAGNOSIS — F51.01 PRIMARY INSOMNIA: ICD-10-CM

## 2018-05-18 RX ORDER — ZOLPIDEM TARTRATE 10 MG/1
10 TABLET ORAL NIGHTLY PRN
Qty: 30 TABLET | Refills: 0 | Status: SHIPPED | OUTPATIENT
Start: 2018-05-18 | End: 2018-06-18

## 2018-05-18 RX ORDER — ALPRAZOLAM 0.5 MG/1
TABLET ORAL
Qty: 30 TABLET | Refills: 0 | Status: SHIPPED | OUTPATIENT
Start: 2018-05-18 | End: 2018-10-23 | Stop reason: SDUPTHER

## 2018-06-11 ENCOUNTER — APPOINTMENT (OUTPATIENT)
Dept: CT IMAGING | Facility: HOSPITAL | Age: 73
End: 2018-06-11

## 2018-06-11 ENCOUNTER — HOSPITAL ENCOUNTER (EMERGENCY)
Facility: HOSPITAL | Age: 73
Discharge: HOME | End: 2018-06-11
Attending: EMERGENCY MEDICINE | Admitting: EMERGENCY MEDICINE

## 2018-06-11 VITALS
HEIGHT: 62 IN | DIASTOLIC BLOOD PRESSURE: 95 MMHG | HEART RATE: 70 BPM | OXYGEN SATURATION: 96 % | RESPIRATION RATE: 16 BRPM | TEMPERATURE: 97.9 F | SYSTOLIC BLOOD PRESSURE: 129 MMHG | BODY MASS INDEX: 23.37 KG/M2 | WEIGHT: 127 LBS

## 2018-06-11 DIAGNOSIS — R42 DIZZINESS: Primary | ICD-10-CM

## 2018-06-11 LAB
ALBUMIN SERPL-MCNC: 4.5 G/DL (ref 3.5–5.2)
ALBUMIN/GLOB SERPL: 1.9 G/DL
ALP SERPL-CCNC: 42 U/L (ref 39–117)
ALT SERPL W P-5'-P-CCNC: 15 U/L (ref 1–33)
ANION GAP SERPL CALCULATED.3IONS-SCNC: 10 MMOL/L
AST SERPL-CCNC: 16 U/L (ref 1–32)
BASOPHILS # BLD AUTO: 0.06 10*3/MM3 (ref 0–0.2)
BASOPHILS NFR BLD AUTO: 0.9 % (ref 0–1.5)
BILIRUB SERPL-MCNC: 0.3 MG/DL (ref 0.1–1.2)
BUN BLD-MCNC: 19 MG/DL (ref 8–23)
BUN/CREAT SERPL: 24.4 (ref 7–25)
CALCIUM SPEC-SCNC: 9.8 MG/DL (ref 8.6–10.5)
CHLORIDE SERPL-SCNC: 103 MMOL/L (ref 98–107)
CO2 SERPL-SCNC: 26 MMOL/L (ref 22–29)
CREAT BLD-MCNC: 0.78 MG/DL (ref 0.57–1)
DEPRECATED RDW RBC AUTO: 44.3 FL (ref 37–54)
EOSINOPHIL # BLD AUTO: 0.57 10*3/MM3 (ref 0–0.7)
EOSINOPHIL NFR BLD AUTO: 9 % (ref 0.3–6.2)
ERYTHROCYTE [DISTWIDTH] IN BLOOD BY AUTOMATED COUNT: 13.1 % (ref 11.7–13)
GFR SERPL CREATININE-BSD FRML MDRD: 72 ML/MIN/1.73
GLOBULIN UR ELPH-MCNC: 2.4 GM/DL
GLUCOSE BLD-MCNC: 85 MG/DL (ref 65–99)
HCT VFR BLD AUTO: 41.5 % (ref 35.6–45.5)
HGB BLD-MCNC: 13.5 G/DL (ref 11.9–15.5)
HOLD SPECIMEN: NORMAL
HOLD SPECIMEN: NORMAL
IMM GRANULOCYTES # BLD: 0.02 10*3/MM3 (ref 0–0.03)
IMM GRANULOCYTES NFR BLD: 0.3 % (ref 0–0.5)
LYMPHOCYTES # BLD AUTO: 1.25 10*3/MM3 (ref 0.9–4.8)
LYMPHOCYTES NFR BLD AUTO: 19.8 % (ref 19.6–45.3)
MCH RBC QN AUTO: 30.1 PG (ref 26.9–32)
MCHC RBC AUTO-ENTMCNC: 32.5 G/DL (ref 32.4–36.3)
MCV RBC AUTO: 92.6 FL (ref 80.5–98.2)
MONOCYTES # BLD AUTO: 0.56 10*3/MM3 (ref 0.2–1.2)
MONOCYTES NFR BLD AUTO: 8.9 % (ref 5–12)
NEUTROPHILS # BLD AUTO: 3.86 10*3/MM3 (ref 1.9–8.1)
NEUTROPHILS NFR BLD AUTO: 61.1 % (ref 42.7–76)
PLATELET # BLD AUTO: 282 10*3/MM3 (ref 140–500)
PMV BLD AUTO: 10 FL (ref 6–12)
POTASSIUM BLD-SCNC: 4.1 MMOL/L (ref 3.5–5.2)
PROT SERPL-MCNC: 6.9 G/DL (ref 6–8.5)
RBC # BLD AUTO: 4.48 10*6/MM3 (ref 3.9–5.2)
SODIUM BLD-SCNC: 139 MMOL/L (ref 136–145)
WBC NRBC COR # BLD: 6.32 10*3/MM3 (ref 4.5–10.7)
WHOLE BLOOD HOLD SPECIMEN: NORMAL
WHOLE BLOOD HOLD SPECIMEN: NORMAL

## 2018-06-11 PROCEDURE — 93010 ELECTROCARDIOGRAM REPORT: CPT | Performed by: INTERNAL MEDICINE

## 2018-06-11 PROCEDURE — 93005 ELECTROCARDIOGRAM TRACING: CPT | Performed by: EMERGENCY MEDICINE

## 2018-06-11 PROCEDURE — 70450 CT HEAD/BRAIN W/O DYE: CPT

## 2018-06-11 PROCEDURE — 99284 EMERGENCY DEPT VISIT MOD MDM: CPT

## 2018-06-11 PROCEDURE — 85025 COMPLETE CBC W/AUTO DIFF WBC: CPT | Performed by: EMERGENCY MEDICINE

## 2018-06-11 PROCEDURE — 80053 COMPREHEN METABOLIC PANEL: CPT | Performed by: EMERGENCY MEDICINE

## 2018-06-11 RX ORDER — SODIUM CHLORIDE 0.9 % (FLUSH) 0.9 %
10 SYRINGE (ML) INJECTION AS NEEDED
Status: DISCONTINUED | OUTPATIENT
Start: 2018-06-11 | End: 2018-06-11 | Stop reason: HOSPADM

## 2018-06-11 RX ORDER — MECLIZINE HYDROCHLORIDE 25 MG/1
25 TABLET ORAL 4 TIMES DAILY PRN
Qty: 20 TABLET | Refills: 0 | Status: SHIPPED | OUTPATIENT
Start: 2018-06-11 | End: 2018-10-23

## 2018-06-11 RX ORDER — ONDANSETRON 4 MG/1
4 TABLET, FILM COATED ORAL EVERY 6 HOURS
Qty: 12 TABLET | Refills: 0 | Status: SHIPPED | OUTPATIENT
Start: 2018-06-11 | End: 2018-10-23

## 2018-06-11 NOTE — ED PROVIDER NOTES
EMERGENCY DEPARTMENT ENCOUNTER    CHIEF COMPLAINT  Chief Complaint: vertigo  History given by: Patient  History limited by: nothing   Room Number: 11/11  PMD: Jocelyn Rico MD      HPI:  Pt is a 72 y.o. female who presents complaining of vertigo that began Friday. Pt has had 3 other similar episodes in the past 2 years. Pt takes nose spray. Pt denies having sinus trouble and any other sx.     Duration:  Began 3 days ago  Onset: gradual   Timing: intermittent   Location: N/A  Radiation: none  Quality: vertigo  Intensity/Severity: Moderate  Progression: worsened  Associated Symptoms: none  Aggravating Factors: none  Alleviating Factors: none  Previous Episodes: Pt has had 3 similar episodes within the past 2 years.  Treatment before arrival: none    PAST MEDICAL HISTORY  Active Ambulatory Problems     Diagnosis Date Noted   • Anxiety 06/13/2016   • Dry skin 06/13/2016   • Hyperlipidemia 06/13/2016   • Adult hypothyroidism 06/13/2016   • Insomnia 06/13/2016   • Vitamin D deficiency 06/13/2016   • Hot flashes due to menopause 04/12/2018     Resolved Ambulatory Problems     Diagnosis Date Noted   • No Resolved Ambulatory Problems     Past Medical History:   Diagnosis Date   • Acne    • Adnexal mass 2002   • Anxiety    • Chronic headaches 01/04/2016   • Chronic low back pain    • DDD (degenerative disc disease), cervical 2005   • Disease of thyroid gland    • Dry skin    • Fall 03/10/2014   • GERD (gastroesophageal reflux disease)    • Glaucoma    • H/O hemorrhoids    • History of strabismus    • Hyperlipidemia    • Hypertension    • Hypertensive emergency 01/11/2006   • Insomnia    • Persistent headaches    • Post-menopausal    • PUD (peptic ulcer disease)    • Rectal bleeding    • Vitamin D deficiency        PAST SURGICAL HISTORY  Past Surgical History:   Procedure Laterality Date   • BACK SURGERY N/A     L5-S1 DISCECTOMY   • COLONOSCOPY N/A 05/27/2010    NON BLEEDING INTERNAL HEMORRHOIDS, BX SHOWED COLLAGENOUS  COLITIS, RESCOPE IN 5 YRS, DR. PEREZ   • COLONOSCOPY N/A 08/23/2004    WNL, RESCOPE IN 5 YRS, DR. CM HASTINGS AT [facility]   • ENDOSCOPY N/A 05/27/2010    EROSIVE GASTROPATHY, BX SHOWED SCATTERED HELIOBACTER LIKE ORGANISMS, DR. PEREZ AT [facility]   • EYE SURGERY      MULTIPLE WITH IOL   • EYE SURGERY      STRABISMUS REPAIR BY RESECTION   • EYE SURGERY Right 02/23/2013    GLAUCOMA SURGERY, DR. RITA THORNE AT [place]   • HYSTERECTOMY N/A 02/25/2002    [facility] BSO, DR. MICKIE MCFADDEN AT [facility]   • TONSILLECTOMY AND ADENOIDECTOMY Bilateral        FAMILY HISTORY  Family History   Problem Relation Age of Onset   • Pneumonia Mother    • COPD Father    • Coronary artery disease Sister    • Breast cancer Sister    • Stroke Brother         CVA   • Heart disease Brother    • Heart attack Brother    • Lung cancer Sister        SOCIAL HISTORY  Social History     Social History   • Marital status:      Spouse name: N/A   • Number of children: N/A   • Years of education: N/A     Occupational History   • Not on file.     Social History Main Topics   • Smoking status: Never Smoker   • Smokeless tobacco: Never Used   • Alcohol use Yes      Comment: MODERATE AMT   • Drug use: No   • Sexual activity: No     Other Topics Concern   • Not on file     Social History Narrative   • No narrative on file       ALLERGIES  Meperidine and Other    REVIEW OF SYSTEMS  Review of Systems   Constitutional: Negative for fever.   HENT: Negative for sore throat.    Eyes: Negative.    Respiratory: Negative for cough and shortness of breath.    Cardiovascular: Negative for chest pain.   Gastrointestinal: Negative for abdominal pain, diarrhea and vomiting.   Genitourinary: Negative for dysuria.   Musculoskeletal: Negative for neck pain.   Skin: Negative for rash.   Allergic/Immunologic: Negative.    Neurological: Positive for dizziness. Negative for weakness, numbness and headaches.   Hematological: Negative.    Psychiatric/Behavioral: Negative.    All other  systems reviewed and are negative.      PHYSICAL EXAM  ED Triage Vitals   Temp Heart Rate Resp BP SpO2   06/11/18 1015 06/11/18 1006 06/11/18 1006 06/11/18 1006 06/11/18 1006   97.9 °F (36.6 °C) 82 16 133/93 97 %      Temp src Heart Rate Source Patient Position BP Location FiO2 (%)   06/11/18 1015 -- -- -- --   Tympanic           Physical Exam   Constitutional: She is oriented to person, place, and time. No distress.   HENT:   Head: Normocephalic and atraumatic.   Eyes: EOM are normal. Pupils are equal, round, and reactive to light.   Pupilary changes from previous cataracts surgery    Neck: Normal range of motion. Neck supple.   Cardiovascular: Normal rate, regular rhythm and normal heart sounds.    Pulmonary/Chest: Effort normal and breath sounds normal. No respiratory distress.   Abdominal: Soft. There is no tenderness. There is no rebound and no guarding.   Musculoskeletal: Normal range of motion. She exhibits no edema.   Neurological: She is alert and oriented to person, place, and time. She has normal sensation and normal strength. Gait normal.   Skin: Skin is warm and dry. No rash noted.   Psychiatric: Mood and affect normal.   Nursing note and vitals reviewed.      LAB RESULTS  Lab Results (last 24 hours)     Procedure Component Value Units Date/Time    CBC & Differential [[id number]] Collected:  06/11/18 1129    Specimen:  Blood Updated:  06/11/18 1148    Narrative:       The following orders were created for panel order CBC & Differential.  Procedure                               Abnormality         Status                     ---------                               -----------         ------                     CBC Auto Differential[[id number]]        Abnormal            Final result                 Please view results for these tests on the individual orders.    Comprehensive Metabolic Panel [[id number]] Collected:  06/11/18 1129    Specimen:  Blood Updated:  06/11/18 1204     Glucose 85 mg/dL      BUN 19  mg/dL      Creatinine 0.78 mg/dL      Sodium 139 mmol/L      Potassium 4.1 mmol/L      Chloride 103 mmol/L      CO2 26.0 mmol/L      Calcium 9.8 mg/dL      Total Protein 6.9 g/dL      Albumin 4.50 g/dL      ALT (SGPT) 15 U/L      AST (SGOT) 16 U/L      Alkaline Phosphatase 42 U/L      Total Bilirubin 0.3 mg/dL      eGFR Non African Amer 72 mL/min/1.73      Globulin 2.4 gm/dL      A/G Ratio 1.9 g/dL      BUN/Creatinine Ratio 24.4     Anion Gap 10.0 mmol/L     Narrative:       The MDRD GFR formula is only valid for adults with stable renal function between ages 18 and 70.    CBC Auto Differential [[id number]]  (Abnormal) Collected:  06/11/18 1129    Specimen:  Blood Updated:  06/11/18 1148     WBC 6.32 10*3/mm3      RBC 4.48 10*6/mm3      Hemoglobin 13.5 g/dL      Hematocrit 41.5 %      MCV 92.6 fL      MCH 30.1 pg      MCHC 32.5 g/dL      RDW 13.1 (H) %      RDW-SD 44.3 fl      MPV 10.0 fL      Platelets 282 10*3/mm3      Neutrophil % 61.1 %      Lymphocyte % 19.8 %      Monocyte % 8.9 %      Eosinophil % 9.0 (H) %      Basophil % 0.9 %      Immature Grans % 0.3 %      Neutrophils, Absolute 3.86 10*3/mm3      Lymphocytes, Absolute 1.25 10*3/mm3      Monocytes, Absolute 0.56 10*3/mm3      Eosinophils, Absolute 0.57 10*3/mm3      Basophils, Absolute 0.06 10*3/mm3      Immature Grans, Absolute 0.02 10*3/mm3           I ordered the above labs and reviewed the results    RADIOLOGY  CT Head Without Contrast    (Results Pending)        I ordered the above noted radiological studies. Interpreted by radiologist. Reviewed by me in PACS.       PROCEDURES  Procedures  EKG           EKG time: 1136  Rhythm/Rate: NSR, 70  P waves and NE: Normal PPR   QRS, axis: Normal   ST and T waves: Normal    Interpreted Contemporaneously by me, independently viewed  unchanged compared to prior 1-      PROGRESS AND CONSULTS      1:59 PM  Rechecked pt and discussed normal labs and CT. Plan to discharge with anti vertigo medications and have  her follow up with PCP. Patient understands and agrees with treatment plan and all questions were addressed.     MEDICAL DECISION MAKING  Results were reviewed/discussed with the patient and they were also made aware of online access. Pt also made aware that some labs, such as cultures, will not be resulted during ER visit and follow up with PMD is necessary.     MDM  Number of Diagnoses or Management Options     Amount and/or Complexity of Data Reviewed  Clinical lab tests: ordered and reviewed (Labs - negative)  Tests in the radiology section of CPT®: ordered and reviewed (CT - negative)  Tests in the medicine section of CPT®: ordered and reviewed  Decide to obtain previous medical records or to obtain history from someone other than the patient: yes           DIAGNOSIS  Final diagnoses:   Dizziness       DISPOSITION  DISCHARGE    Patient discharged in stable condition.    Reviewed implications of results, diagnosis, meds, responsibility to follow up, warning signs and symptoms of possible worsening, potential complications and reasons to return to ER.    Patient/Family voiced understanding of above instructions.    Discussed plan for discharge, as there is no emergent indication for admission. Patient referred to primary care provider for BP management due to today's BP. Pt/family is agreeable and understands need for follow up and repeat testing.  Pt is aware that discharge does not mean that nothing is wrong but it indicates no emergency is present that requires admission and they must continue care with follow-up as given below or physician of their choice.     FOLLOW-UP  Jocelyn Rico MD  [address] [address] [phone]    Schedule an appointment as soon as possible for a visit   If symptoms worsen or do not improve         Medication List      New Prescriptions    meclizine 25 MG tablet  Commonly known as:  ANTIVERT  Take 1 tablet by mouth 4 (Four) Times a Day As Needed  for dizziness.     ondansetron 4 MG tablet  Commonly known as:  ZOFRAN  Take 1 tablet by mouth Every 6 (Six) Hours.              Latest Documented Vital Signs:  As of 2:00 PM  BP- 112/92 HR- 70 Temp- 97.9 °F (36.6 °C) (Tympanic) O2 sat- 94%    --  Documentation assistance provided by liban Olivia for Dr. Ken.  Information recorded by the jenniferibnitin was done at my direction and has been verified and validated by me.     Juan M Olivia  06/11/18 7256       Farooq Ken MD  06/11/18 1629

## 2018-06-11 NOTE — ED NOTES
Pt to ED for intermittent dizziness, room spinning sensation, left posterior headaches worse at night and tinnitus for several years. Pt reports dizziness became worse on Friday. Reports onset of left posterior headache with pain into left side of her neck last night and has hx of bone spurs in left shoulder. Pt states she has seen her doctor for same complaint a couple times and was put on a nose spray. Pt in NAD. Pt ambulated to ER treatment room without problem. Skin p/w/d     Lucretia Spaulding RN  06/11/18 1019

## 2018-06-14 DIAGNOSIS — F51.01 PRIMARY INSOMNIA: ICD-10-CM

## 2018-06-14 RX ORDER — ZOLPIDEM TARTRATE 10 MG/1
TABLET ORAL
Qty: 30 TABLET | Refills: 0 | OUTPATIENT
Start: 2018-06-14

## 2018-06-15 ENCOUNTER — TELEPHONE (OUTPATIENT)
Dept: FAMILY MEDICINE CLINIC | Facility: CLINIC | Age: 73
End: 2018-06-15

## 2018-06-18 RX ORDER — ZOLPIDEM TARTRATE 5 MG/1
TABLET ORAL
Qty: 17 TABLET | Refills: 0 | Status: SHIPPED | OUTPATIENT
Start: 2018-06-18 | End: 2018-11-09

## 2018-06-18 RX ORDER — DOXEPIN HYDROCHLORIDE 10 MG/1
10 CAPSULE ORAL NIGHTLY
Qty: 30 CAPSULE | Refills: 1 | Status: SHIPPED | OUTPATIENT
Start: 2018-06-18 | End: 2018-10-23

## 2018-06-28 DIAGNOSIS — I10 ESSENTIAL HYPERTENSION: ICD-10-CM

## 2018-06-28 RX ORDER — BENAZEPRIL HYDROCHLORIDE AND HYDROCHLOROTHIAZIDE 5; 6.25 MG/1; MG/1
1 TABLET ORAL DAILY
Qty: 90 TABLET | Refills: 1 | Status: SHIPPED | OUTPATIENT
Start: 2018-06-28 | End: 2019-01-29 | Stop reason: SDUPTHER

## 2018-08-02 DIAGNOSIS — F41.9 ANXIETY: ICD-10-CM

## 2018-08-03 RX ORDER — ALPRAZOLAM 0.5 MG/1
TABLET ORAL
Qty: 30 TABLET | Refills: 0 | OUTPATIENT
Start: 2018-08-03

## 2018-08-10 RX ORDER — PANTOPRAZOLE SODIUM 40 MG/1
40 TABLET, DELAYED RELEASE ORAL 2 TIMES DAILY
Qty: 180 TABLET | Refills: 1 | Status: SHIPPED | OUTPATIENT
Start: 2018-08-10 | End: 2019-02-10 | Stop reason: SDUPTHER

## 2018-08-27 RX ORDER — FENOFIBRATE 160 MG/1
TABLET ORAL
Qty: 90 TABLET | Refills: 1 | Status: SHIPPED | OUTPATIENT
Start: 2018-08-27 | End: 2019-03-17 | Stop reason: SDUPTHER

## 2018-10-05 DIAGNOSIS — E03.9 ADULT HYPOTHYROIDISM: ICD-10-CM

## 2018-10-05 RX ORDER — LEVOTHYROXINE SODIUM 0.07 MG/1
TABLET ORAL
Qty: 90 TABLET | Refills: 1 | Status: SHIPPED | OUTPATIENT
Start: 2018-10-05 | End: 2018-10-23

## 2018-10-23 ENCOUNTER — OFFICE VISIT (OUTPATIENT)
Dept: FAMILY MEDICINE CLINIC | Facility: CLINIC | Age: 73
End: 2018-10-23

## 2018-10-23 VITALS
BODY MASS INDEX: 23 KG/M2 | WEIGHT: 125 LBS | HEIGHT: 62 IN | SYSTOLIC BLOOD PRESSURE: 128 MMHG | OXYGEN SATURATION: 99 % | HEART RATE: 72 BPM | DIASTOLIC BLOOD PRESSURE: 88 MMHG

## 2018-10-23 DIAGNOSIS — E03.9 ACQUIRED HYPOTHYROIDISM: ICD-10-CM

## 2018-10-23 DIAGNOSIS — Z23 NEED FOR PNEUMOCOCCAL VACCINE: ICD-10-CM

## 2018-10-23 DIAGNOSIS — F41.9 ANXIETY: ICD-10-CM

## 2018-10-23 DIAGNOSIS — R19.7 DIARRHEA, UNSPECIFIED TYPE: Primary | ICD-10-CM

## 2018-10-23 PROCEDURE — 99214 OFFICE O/P EST MOD 30 MIN: CPT | Performed by: FAMILY MEDICINE

## 2018-10-23 PROCEDURE — 90670 PCV13 VACCINE IM: CPT | Performed by: FAMILY MEDICINE

## 2018-10-23 PROCEDURE — G0009 ADMIN PNEUMOCOCCAL VACCINE: HCPCS | Performed by: FAMILY MEDICINE

## 2018-10-23 RX ORDER — PREDNISOLONE ACETATE 10 MG/ML
SUSPENSION/ DROPS OPHTHALMIC
COMMUNITY
Start: 2018-08-22

## 2018-10-23 RX ORDER — DICYCLOMINE HYDROCHLORIDE 10 MG/1
10 CAPSULE ORAL
Qty: 30 CAPSULE | Refills: 0 | Status: SHIPPED | OUTPATIENT
Start: 2018-10-23 | End: 2019-04-23 | Stop reason: CLARIF

## 2018-10-23 RX ORDER — ALPRAZOLAM 0.5 MG/1
0.5 TABLET ORAL DAILY PRN
Qty: 30 TABLET | Refills: 0 | Status: SHIPPED | OUTPATIENT
Start: 2018-10-23 | End: 2019-04-23 | Stop reason: SDUPTHER

## 2018-10-24 ENCOUNTER — TELEPHONE (OUTPATIENT)
Dept: FAMILY MEDICINE CLINIC | Facility: CLINIC | Age: 73
End: 2018-10-24

## 2018-10-24 NOTE — TELEPHONE ENCOUNTER
Pharmacy called stating that the 10mg DICYCLOMINE  is not available right now and they would like a new dosage sent in

## 2018-10-25 NOTE — TELEPHONE ENCOUNTER
Pt is repeatedly sensitive to medications and I would prefer the lower dose. Not sure how easy this would be to cut. Can you please see if the pt has another pharmacy we could try? Thanks.

## 2018-10-26 LAB
ALBUMIN SERPL-MCNC: 4.8 G/DL (ref 3.5–5.2)
ALBUMIN/GLOB SERPL: 1.9 G/DL
ALP SERPL-CCNC: 34 U/L (ref 39–117)
ALT SERPL-CCNC: 13 U/L (ref 1–33)
AST SERPL-CCNC: 19 U/L (ref 1–32)
BILIRUB SERPL-MCNC: 0.3 MG/DL (ref 0.1–1.2)
BUN SERPL-MCNC: 23 MG/DL (ref 8–23)
BUN/CREAT SERPL: 30.7 (ref 7–25)
CALCIUM SERPL-MCNC: 10.1 MG/DL (ref 8.6–10.5)
CHLORIDE SERPL-SCNC: 97 MMOL/L (ref 98–107)
CO2 SERPL-SCNC: 27 MMOL/L (ref 22–29)
CREAT SERPL-MCNC: 0.75 MG/DL (ref 0.57–1)
GLOBULIN SER CALC-MCNC: 2.5 GM/DL
GLUCOSE SERPL-MCNC: 91 MG/DL (ref 65–99)
POTASSIUM SERPL-SCNC: 4.5 MMOL/L (ref 3.5–5.2)
PROT SERPL-MCNC: 7.3 G/DL (ref 6–8.5)
SALMONELLA SP AB [TITER] IN SERUM: NEGATIVE {TITER}
SODIUM SERPL-SCNC: 136 MMOL/L (ref 136–145)
TSH SERPL DL<=0.005 MIU/L-ACNC: 2.28 MIU/ML (ref 0.27–4.2)

## 2018-10-28 LAB
C DIFF TOX GENS STL QL NAA+PROBE: NEGATIVE
E COLI SXT STL QL IA: NEGATIVE
G LAMBLIA AG STL QL IA: NEGATIVE

## 2018-11-01 ENCOUNTER — TELEPHONE (OUTPATIENT)
Dept: FAMILY MEDICINE CLINIC | Facility: CLINIC | Age: 73
End: 2018-11-01

## 2018-11-01 NOTE — TELEPHONE ENCOUNTER
Spoke with patient about her lab results, patient stated that the medication is working and if she feels its not getting better she will schedule an appointment with Dr Agee

## 2018-11-08 ENCOUNTER — TELEPHONE (OUTPATIENT)
Dept: FAMILY MEDICINE CLINIC | Facility: CLINIC | Age: 73
End: 2018-11-08

## 2018-11-09 ENCOUNTER — TELEPHONE (OUTPATIENT)
Dept: FAMILY MEDICINE CLINIC | Facility: CLINIC | Age: 73
End: 2018-11-09

## 2018-11-09 DIAGNOSIS — R19.7 DIARRHEA, UNSPECIFIED TYPE: Primary | ICD-10-CM

## 2018-11-09 RX ORDER — METRONIDAZOLE 500 MG/1
500 TABLET ORAL 3 TIMES DAILY
Qty: 21 TABLET | Refills: 0 | Status: SHIPPED | OUTPATIENT
Start: 2018-11-09 | End: 2019-04-23

## 2018-11-09 RX ORDER — CIPROFLOXACIN 500 MG/1
500 TABLET, FILM COATED ORAL EVERY 12 HOURS SCHEDULED
Qty: 14 TABLET | Refills: 0 | Status: SHIPPED | OUTPATIENT
Start: 2018-11-09 | End: 2019-04-23

## 2018-11-09 NOTE — TELEPHONE ENCOUNTER
STEPHANIEM on personal phone. Going to do course of abx related to her diarrhea and diverticulosis on c-scope, potential for diverticulitis. Do not think this is infectious diarrhea in her case but given her symptoms we can try course. Needs to take abx dosing multiple times per day and she should complete the whole course. While on abx she should d/c magnesium and vitamins for that week and the following 3 days after course. Call with questions.

## 2018-11-09 NOTE — TELEPHONE ENCOUNTER
Patient called requesting antibiotics be sent to her local pharmacy due to her having bacteria in her colon and diarrhea.

## 2019-01-17 DIAGNOSIS — Z79.890 HORMONE REPLACEMENT THERAPY, POSTMENOPAUSAL: Primary | ICD-10-CM

## 2019-01-29 DIAGNOSIS — I10 ESSENTIAL HYPERTENSION: ICD-10-CM

## 2019-01-29 RX ORDER — ESTRADIOL 0.5 MG/1
TABLET ORAL
Qty: 90 TABLET | Refills: 0 | OUTPATIENT
Start: 2019-01-29

## 2019-01-29 RX ORDER — BENAZEPRIL HYDROCHLORIDE AND HYDROCHLOROTHIAZIDE 5; 6.25 MG/1; MG/1
TABLET ORAL
Qty: 90 TABLET | Refills: 1 | Status: SHIPPED | OUTPATIENT
Start: 2019-01-29 | End: 2019-08-14 | Stop reason: SDUPTHER

## 2019-02-11 RX ORDER — PANTOPRAZOLE SODIUM 40 MG/1
TABLET, DELAYED RELEASE ORAL
Qty: 180 TABLET | Refills: 1 | Status: SHIPPED | OUTPATIENT
Start: 2019-02-11 | End: 2019-08-14 | Stop reason: SDUPTHER

## 2019-03-18 RX ORDER — FENOFIBRATE 160 MG/1
TABLET ORAL
Qty: 90 TABLET | Refills: 1 | Status: SHIPPED | OUTPATIENT
Start: 2019-03-18 | End: 2019-10-24 | Stop reason: SDUPTHER

## 2019-03-29 ENCOUNTER — OFFICE VISIT (OUTPATIENT)
Dept: OBSTETRICS AND GYNECOLOGY | Facility: CLINIC | Age: 74
End: 2019-03-29

## 2019-03-29 VITALS
SYSTOLIC BLOOD PRESSURE: 110 MMHG | WEIGHT: 122 LBS | DIASTOLIC BLOOD PRESSURE: 70 MMHG | HEIGHT: 62 IN | BODY MASS INDEX: 22.45 KG/M2

## 2019-03-29 DIAGNOSIS — Z13.9 SCREENING FOR CONDITION: ICD-10-CM

## 2019-03-29 DIAGNOSIS — N95.1 HOT FLASHES DUE TO MENOPAUSE: Primary | ICD-10-CM

## 2019-03-29 DIAGNOSIS — F41.9 ANXIETY: ICD-10-CM

## 2019-03-29 LAB
BILIRUB BLD-MCNC: NEGATIVE MG/DL
CLARITY, POC: CLEAR
COLOR UR: YELLOW
GLUCOSE UR STRIP-MCNC: NEGATIVE MG/DL
KETONES UR QL: NEGATIVE
LEUKOCYTE EST, POC: NEGATIVE
NITRITE UR-MCNC: NEGATIVE MG/ML
PH UR: 5 [PH] (ref 5–8)
PROT UR STRIP-MCNC: NEGATIVE MG/DL
RBC # UR STRIP: NEGATIVE /UL
SP GR UR: 1 (ref 1–1.03)
UROBILINOGEN UR QL: NORMAL

## 2019-03-29 PROCEDURE — 81002 URINALYSIS NONAUTO W/O SCOPE: CPT | Performed by: NURSE PRACTITIONER

## 2019-03-29 PROCEDURE — 99203 OFFICE O/P NEW LOW 30 MIN: CPT | Performed by: NURSE PRACTITIONER

## 2019-03-29 RX ORDER — VENLAFAXINE HYDROCHLORIDE 75 MG/1
75 CAPSULE, EXTENDED RELEASE ORAL DAILY
Qty: 30 CAPSULE | Refills: 2 | Status: SHIPPED | OUTPATIENT
Start: 2019-03-29 | End: 2019-04-23 | Stop reason: SINTOL

## 2019-03-29 NOTE — PROGRESS NOTES
"Subjective     Chief Complaint   Patient presents with   • Gynecologic Exam     hot flashes       Lou Shaffer is a 73 y.o.  whose LMP is No LMP recorded (lmp unknown). Patient has had a hysterectomy. She is a new patient. She was referred from Dr. Rico's office. She presents with complaints of hot flashes. She is s/p hysterectomy in the s d/t an abnormall enlarged ovary. Reports she has taken estradiol in the past. She stopped taking them approx 1 month ago d/t running out of a prescription. Her prescription was an old prescription but she reports she was breaking her tablet in to small pieces to make the prescription last longer. States she can not find a provider to prescribe her estrogen anymore. She has recently tried Paxil and doxepin to help with her hot flashes. She reports the paxil made her \"feel heavy\" and the doxepin \"made me sleep too much.\"  She really wants estradiol but she understands that \"I shouldn't be on them for a long time.\"  She also asked for a xanax prescription to help with her anxiety. This problem is new to me, the examiner.     HPI    HPI    The following portions of the patient's history were reviewed and updated as appropriate:vital signs, allergies, current medications, past medical history, past social history, past surgical history and problem list      Review of Systems     Review of Systems   Constitutional: Negative.    Respiratory: Negative.    Cardiovascular: Negative.    Gastrointestinal: Negative.    Endocrine: Negative.         Hot flashes, occur daily     Genitourinary: Negative.    Musculoskeletal: Negative.    Skin: Negative.    Neurological: Negative.    Psychiatric/Behavioral: The patient is nervous/anxious.        Objective      /70   Ht 157.5 cm (62\")   Wt 55.3 kg (122 lb)   LMP  (LMP Unknown)   BMI 22.31 kg/m²     Physical Exam    Physical Exam   Constitutional: She is oriented to person, place, and time. She appears well-developed and " well-nourished.   Pulmonary/Chest: Effort normal. No respiratory distress.   Musculoskeletal: Normal range of motion.   Neurological: She is alert and oriented to person, place, and time.   Skin: Skin is warm and dry.   Psychiatric: She has a normal mood and affect. Her behavior is normal.   Vitals reviewed.      Lab Review   Labs: Urinalysis - with micro     Imaging   No data reviewed    Assessment  Lou was seen today for gynecologic exam.    Diagnoses and all orders for this visit:    Hot flashes due to menopause    Screening for condition  -     POC Urinalysis Dipstick    Anxiety    Other orders  -     venlafaxine XR (EFFEXOR XR) 75 MG 24 hr capsule; Take 1 capsule by mouth Daily.        Additional Assessment:   1)  Hot flashes  2)  S/P hysterectomy     Plan     1.  Hot flashes- Information provided on non hormonal and non prescription medication for treatment of hot flashes, as well as dietary changes. She agrees to trial effexor. Rev page s/s. Patient counseled that hormone treatment should be used to help with specific           symptoms related to menopause such as hot flashes, night sweats and vaginal atrophy. Hormones should not be given for “general wellbeing” or to avoid aging. The lowest dose hormone that treats symptoms should be used for the shortest about of time possible.  Although estrogen is the most effective treatment for hot flashes, other non hormonal options exist (such as Brisdelle or venlafaxine) and should also be considered.  All hormone therapy, whether it is synthetic or bio identical, can lead to increased risk of stroke, heart attack and thromboembolic diseases.  These adverse events are more likely to develop in the first year of use and in patients who are older than the typical menopausal age.  Risks of estrogen dependent cancers such as breast cancer increase with prolonged use. Attempts to wean hormone therapy should be discussed annually and particularly after three to five  years of use.    2.   Smoking status: non smoker     3.   BMI Status: Patient's Body mass index is 22.31 kg/m². BMI is within normal parameters. No follow-up required..    4.   Anxiety- Disc that effexor may help with her anxiety as well. Rec a psychiatrist for eval for anxiety medication.     5.   Annual Exam scheduled for: Needs to schedule     RTO 1 month     Counseling was given to patient for the following topics: instructions for management, risk factor reductions, prognosis, patient and family education, impressions, risks and benefits of treatment options and importance of treatment compliance . Total time of the encounter was 30 minutes and 25 minutes was spend counseling.      Malissa Moreira, APRN  3/29/2019

## 2019-04-08 ENCOUNTER — TELEPHONE (OUTPATIENT)
Dept: OBSTETRICS AND GYNECOLOGY | Facility: CLINIC | Age: 74
End: 2019-04-08

## 2019-04-08 NOTE — TELEPHONE ENCOUNTER
Patient states when she took the Effexor it made her sick. She is wanting to know why she can't be put on HRT. Please advise

## 2019-04-10 DIAGNOSIS — F41.9 ANXIETY: ICD-10-CM

## 2019-04-10 NOTE — TELEPHONE ENCOUNTER
Last seen 10/23/18. No contract noted. Kyle in chart. Please advise if this patient needs an appt.

## 2019-04-11 DIAGNOSIS — E03.9 ADULT HYPOTHYROIDISM: ICD-10-CM

## 2019-04-11 RX ORDER — LEVOTHYROXINE SODIUM 0.07 MG/1
TABLET ORAL
Qty: 90 TABLET | Refills: 1 | Status: SHIPPED | OUTPATIENT
Start: 2019-04-11 | End: 2019-10-24 | Stop reason: SDUPTHER

## 2019-04-12 RX ORDER — ALPRAZOLAM 0.5 MG/1
TABLET ORAL
Qty: 30 TABLET | Refills: 0 | OUTPATIENT
Start: 2019-04-12

## 2019-04-16 NOTE — TELEPHONE ENCOUNTER
I have already disc at length with this patient the reasons why she should not be on HRT. I have given her several alternative suggestions.

## 2019-04-23 ENCOUNTER — OFFICE VISIT (OUTPATIENT)
Dept: FAMILY MEDICINE CLINIC | Facility: CLINIC | Age: 74
End: 2019-04-23

## 2019-04-23 VITALS
BODY MASS INDEX: 22.38 KG/M2 | RESPIRATION RATE: 18 BRPM | HEART RATE: 70 BPM | DIASTOLIC BLOOD PRESSURE: 92 MMHG | SYSTOLIC BLOOD PRESSURE: 124 MMHG | WEIGHT: 121.6 LBS | HEIGHT: 62 IN | TEMPERATURE: 98.5 F | OXYGEN SATURATION: 98 %

## 2019-04-23 DIAGNOSIS — E03.9 ADULT HYPOTHYROIDISM: ICD-10-CM

## 2019-04-23 DIAGNOSIS — N95.1 HOT FLASHES DUE TO MENOPAUSE: Primary | ICD-10-CM

## 2019-04-23 DIAGNOSIS — F41.9 ANXIETY: ICD-10-CM

## 2019-04-23 PROCEDURE — 99214 OFFICE O/P EST MOD 30 MIN: CPT | Performed by: FAMILY MEDICINE

## 2019-04-23 RX ORDER — BUDESONIDE 3 MG/1
9 CAPSULE, COATED PELLETS ORAL DAILY
COMMUNITY
Start: 2019-02-14

## 2019-04-23 RX ORDER — ALPRAZOLAM 0.5 MG/1
0.5 TABLET ORAL DAILY PRN
Qty: 30 TABLET | Refills: 0 | Status: SHIPPED | OUTPATIENT
Start: 2019-04-23 | End: 2019-10-31 | Stop reason: SDUPTHER

## 2019-04-23 NOTE — PROGRESS NOTES
"Chief Complaint   Patient presents with   • Med Management     controlled med appt needs contract, Klye in chart   • Hot Flashes       Subjective   Lou Shaffer is a 73 y.o. female.     History of Present Illness   She is off pain medication and ambien. Using the alprazolam sparingly. She had 30 given in 10/2018 and she still has a few. She is taking melatonin for sleep. She is sleeping well. She is handling the pain ok.     Hot flashes  She saw Esther Moreira with gynecology who does not do hormone therapy. She was given venlafaxine. She gets nauseated when she took it. Feels sick for about 3 days after taking it. She tried to take it twice and both times had the same reaction. She has also tried paxil which made her sick and feel weird. She has not tolerated medication alternatives very well. She also tried doxepin for sleep and slept for two days. She has hot flashes that have continued. Said her mother also had this experience, hot flashes long after menopause started. She gets them worse at night. After she washes her hair she starts sweating.     The following portions of the patient's history were reviewed and updated as appropriate: allergies, current medications, past medical history, past social history and problem list.    Review of Systems   Respiratory: Negative.    Cardiovascular: Negative.    Endocrine: Negative.    Neurological: Negative.        Vitals:    04/23/19 1153   BP: 124/92   BP Location: Right arm   Patient Position: Sitting   Cuff Size: Adult   Pulse: 70   Resp: 18   Temp: 98.5 °F (36.9 °C)   TempSrc: Oral   SpO2: 98%   Weight: 55.2 kg (121 lb 9.6 oz)   Height: 157.5 cm (62\")       Objective   Physical Exam   Constitutional: She is oriented to person, place, and time. She appears well-nourished. No distress.   Eyes: Conjunctivae are normal. Right eye exhibits no discharge. Left eye exhibits no discharge. No scleral icterus.   Cardiovascular: Normal rate, regular rhythm, normal " heart sounds and intact distal pulses. Exam reveals no gallop and no friction rub.   No murmur heard.  Pulmonary/Chest: Effort normal and breath sounds normal. No respiratory distress. She has no wheezes.   Musculoskeletal: She exhibits no edema.   Neurological: She is alert and oriented to person, place, and time.   Psychiatric: She has a normal mood and affect. Her behavior is normal.   Vitals reviewed.      Assessment/Plan   Lou was seen today for med management and hot flashes.    Diagnoses and all orders for this visit:    Hot flashes due to menopause    Adult hypothyroidism    Anxiety    Discussed referral to Dr. Wetzel or Dr. Gunn to discuss hormone therapy. Pt wants to go somewhere close over to this area of town. She is unexcited about another co-pay and potentially trying another medication that she will take few of and not tolerate. She wants to try to go without hormone. Thinking that her hot flashes may improve with time. She wants to hold on another referral at this time. We discussed black cohosh and she thinks she took this. Helped a little for a while then effect faded and no longer helpful. We also discussed taking soy in her diet for the phytoestrogens. She will look into this and let me know if she changes her mind about the gyn referral. Considered labs today given time since last labs and her hot flashes but pt declined. Reported they have to pay $38 every time they have labs drawn and this is new.    Will check in on thyroid at next visit.     Anxiety is really infrequent and related to certain situations. We will continue to do 60-90 0.5 mg alprazolam tablets per year given other medications she tried and the resulting significant intolerance and side effects to those medications for her. She takes 1/2 of a tablet and does not use regularly. She has very little left of the last 30 tablets she received but did get that script in 10/2018.    Reviewed and signed controlled substance  agreement today to keep chart up to date. Pt was given the opportunity to ask questions regarding the contract and medication and pt did not have any questions. Significant risks can occur with these medications including sedation, respiratory depression, addiction, dependence, abuse, injury related to side effects. Obtain prescription from this office and physicians only, same pharmacy for refill unless notification sent to office from new pharmacy well in advance by alternate pharmacy. Office can urine drug screen pt at any time. No misuse: take as prescribed, do not share or sell the medication. No early refills for lost or stolen prescriptions.

## 2019-08-07 DIAGNOSIS — Z12.39 SCREENING FOR BREAST CANCER: Primary | ICD-10-CM

## 2019-08-12 ENCOUNTER — TRANSCRIBE ORDERS (OUTPATIENT)
Dept: ADMINISTRATIVE | Facility: HOSPITAL | Age: 74
End: 2019-08-12

## 2019-08-12 DIAGNOSIS — Z12.31 VISIT FOR SCREENING MAMMOGRAM: Primary | ICD-10-CM

## 2019-08-14 DIAGNOSIS — I10 ESSENTIAL HYPERTENSION: ICD-10-CM

## 2019-08-14 RX ORDER — BENAZEPRIL HYDROCHLORIDE AND HYDROCHLOROTHIAZIDE 5; 6.25 MG/1; MG/1
TABLET ORAL
Qty: 90 TABLET | Refills: 1 | Status: SHIPPED | OUTPATIENT
Start: 2019-08-14 | End: 2020-02-26

## 2019-08-14 RX ORDER — PANTOPRAZOLE SODIUM 40 MG/1
TABLET, DELAYED RELEASE ORAL
Qty: 180 TABLET | Refills: 1 | Status: SHIPPED | OUTPATIENT
Start: 2019-08-14 | End: 2020-02-26

## 2019-08-23 ENCOUNTER — HOSPITAL ENCOUNTER (OUTPATIENT)
Dept: MAMMOGRAPHY | Facility: HOSPITAL | Age: 74
Discharge: HOME | End: 2019-08-23
Admitting: FAMILY MEDICINE

## 2019-08-23 DIAGNOSIS — Z12.31 VISIT FOR SCREENING MAMMOGRAM: ICD-10-CM

## 2019-08-23 PROCEDURE — 77067 SCR MAMMO BI INCL CAD: CPT

## 2019-08-23 PROCEDURE — 77063 BREAST TOMOSYNTHESIS BI: CPT

## 2019-10-24 DIAGNOSIS — E03.9 ADULT HYPOTHYROIDISM: ICD-10-CM

## 2019-10-24 RX ORDER — LEVOTHYROXINE SODIUM 0.07 MG/1
TABLET ORAL
Qty: 90 TABLET | Refills: 1 | Status: SHIPPED | OUTPATIENT
Start: 2019-10-24 | End: 2020-04-06

## 2019-10-24 RX ORDER — FENOFIBRATE 160 MG/1
TABLET ORAL
Qty: 90 TABLET | Refills: 1 | Status: SHIPPED | OUTPATIENT
Start: 2019-10-24 | End: 2020-04-06

## 2019-10-31 ENCOUNTER — OFFICE VISIT (OUTPATIENT)
Dept: FAMILY MEDICINE CLINIC | Facility: CLINIC | Age: 74
End: 2019-10-31

## 2019-10-31 VITALS
HEIGHT: 62 IN | SYSTOLIC BLOOD PRESSURE: 122 MMHG | TEMPERATURE: 98 F | HEART RATE: 70 BPM | OXYGEN SATURATION: 98 % | BODY MASS INDEX: 22.63 KG/M2 | DIASTOLIC BLOOD PRESSURE: 72 MMHG | WEIGHT: 123 LBS | RESPIRATION RATE: 16 BRPM

## 2019-10-31 DIAGNOSIS — E03.9 ADULT HYPOTHYROIDISM: ICD-10-CM

## 2019-10-31 DIAGNOSIS — L90.5 SCAR OF NOSE: ICD-10-CM

## 2019-10-31 DIAGNOSIS — E78.5 HYPERLIPIDEMIA, UNSPECIFIED HYPERLIPIDEMIA TYPE: ICD-10-CM

## 2019-10-31 DIAGNOSIS — N95.1 HOT FLASHES DUE TO MENOPAUSE: Primary | ICD-10-CM

## 2019-10-31 DIAGNOSIS — E55.9 VITAMIN D DEFICIENCY: ICD-10-CM

## 2019-10-31 DIAGNOSIS — F41.9 ANXIETY: ICD-10-CM

## 2019-10-31 PROCEDURE — 90732 PPSV23 VACC 2 YRS+ SUBQ/IM: CPT | Performed by: FAMILY MEDICINE

## 2019-10-31 PROCEDURE — G0009 ADMIN PNEUMOCOCCAL VACCINE: HCPCS | Performed by: FAMILY MEDICINE

## 2019-10-31 PROCEDURE — 99214 OFFICE O/P EST MOD 30 MIN: CPT | Performed by: FAMILY MEDICINE

## 2019-10-31 RX ORDER — ALPRAZOLAM 0.5 MG/1
0.5 TABLET ORAL DAILY PRN
Qty: 30 TABLET | Refills: 0 | Status: SHIPPED | OUTPATIENT
Start: 2019-10-31 | End: 2020-04-23 | Stop reason: SDUPTHER

## 2019-11-01 DIAGNOSIS — D72.819 LEUKOPENIA, UNSPECIFIED TYPE: Primary | ICD-10-CM

## 2019-11-01 LAB
25(OH)D3+25(OH)D2 SERPL-MCNC: 86.6 NG/ML (ref 30–100)
BASOPHILS # BLD AUTO: (no result) 10*3/UL
BUN SERPL-MCNC: 17 MG/DL (ref 8–23)
BUN/CREAT SERPL: 20.5 (ref 7–25)
CALCIUM SERPL-MCNC: 10.2 MG/DL (ref 8.6–10.5)
CHLORIDE SERPL-SCNC: 93 MMOL/L (ref 98–107)
CHOLEST SERPL-MCNC: 192 MG/DL (ref 0–200)
CO2 SERPL-SCNC: 27 MMOL/L (ref 22–29)
CREAT SERPL-MCNC: 0.83 MG/DL (ref 0.57–1)
DIFFERENTIAL COMMENT: ABNORMAL
EOSINOPHIL # BLD AUTO: (no result) 10*3/UL
EOSINOPHIL # BLD MANUAL: 0.04 10*3/MM3 (ref 0–0.4)
EOSINOPHIL NFR BLD AUTO: (no result) %
EOSINOPHIL NFR BLD MANUAL: 3 % (ref 0.3–6.2)
ERYTHROCYTE [DISTWIDTH] IN BLOOD BY AUTOMATED COUNT: 12.6 % (ref 12.3–15.4)
GLUCOSE SERPL-MCNC: 86 MG/DL (ref 65–99)
HCT VFR BLD AUTO: 42.2 % (ref 34–46.6)
HDLC SERPL-MCNC: 98 MG/DL (ref 40–60)
HGB BLD-MCNC: 14 G/DL (ref 12–15.9)
LDLC SERPL CALC-MCNC: 77 MG/DL (ref 0–100)
LYMPHOCYTES # BLD AUTO: (no result) 10*3/UL
LYMPHOCYTES # BLD MANUAL: 1.04 10*3/MM3 (ref 0.7–3.1)
LYMPHOCYTES NFR BLD AUTO: (no result) %
LYMPHOCYTES NFR BLD MANUAL: 76.2 % (ref 19.6–45.3)
MCH RBC QN AUTO: 30.2 PG (ref 26.6–33)
MCHC RBC AUTO-ENTMCNC: 33.2 G/DL (ref 31.5–35.7)
MCV RBC AUTO: 91.1 FL (ref 79–97)
MONOCYTES # BLD MANUAL: 0.01 10*3/MM3 (ref 0.1–0.9)
MONOCYTES NFR BLD AUTO: (no result) %
MONOCYTES NFR BLD MANUAL: 1 % (ref 5–12)
NEUTROPHILS # BLD MANUAL: 0.24 10*3/MM3 (ref 1.7–7)
NEUTROPHILS NFR BLD AUTO: (no result) %
NEUTROPHILS NFR BLD MANUAL: 17.8 % (ref 42.7–76)
PLATELET # BLD AUTO: 265 10*3/MM3 (ref 140–450)
PLATELET BLD QL SMEAR: ABNORMAL
POTASSIUM SERPL-SCNC: 4.6 MMOL/L (ref 3.5–5.2)
RBC # BLD AUTO: 4.63 10*6/MM3 (ref 3.77–5.28)
RBC MORPH BLD: ABNORMAL
SODIUM SERPL-SCNC: 134 MMOL/L (ref 136–145)
T4 SERPL-MCNC: 10 MCG/DL (ref 4.5–11.7)
TRIGL SERPL-MCNC: 87 MG/DL (ref 0–150)
TSH SERPL DL<=0.005 MIU/L-ACNC: 1.81 UIU/ML (ref 0.27–4.2)
VLDLC SERPL CALC-MCNC: 17.4 MG/DL
WBC # BLD AUTO: 1.36 10*3/MM3 (ref 3.4–10.8)

## 2019-11-04 ENCOUNTER — CLINICAL SUPPORT (OUTPATIENT)
Dept: FAMILY MEDICINE CLINIC | Facility: CLINIC | Age: 74
End: 2019-11-04

## 2019-11-04 DIAGNOSIS — R30.0 DYSURIA: ICD-10-CM

## 2019-11-04 DIAGNOSIS — R82.90 ABNORMAL FINDING ON URINALYSIS: Primary | ICD-10-CM

## 2019-11-04 LAB
BILIRUB BLD-MCNC: NEGATIVE MG/DL
CLARITY, POC: CLEAR
COLOR UR: ABNORMAL
GLUCOSE UR STRIP-MCNC: NEGATIVE MG/DL
KETONES UR QL: NEGATIVE
LEUKOCYTE EST, POC: ABNORMAL
NITRITE UR-MCNC: NEGATIVE MG/ML
PH UR: 6 [PH] (ref 5–8)
PROT UR STRIP-MCNC: NEGATIVE MG/DL
RBC # UR STRIP: ABNORMAL /UL
SP GR UR: 1.02 (ref 1–1.03)
UROBILINOGEN UR QL: NORMAL

## 2019-11-04 PROCEDURE — 81003 URINALYSIS AUTO W/O SCOPE: CPT | Performed by: FAMILY MEDICINE

## 2019-11-05 LAB
BASOPHILS # BLD AUTO: 0.1 X10E3/UL (ref 0–0.2)
BASOPHILS NFR BLD AUTO: 1 %
EOSINOPHIL # BLD AUTO: 0.3 X10E3/UL (ref 0–0.4)
EOSINOPHIL NFR BLD AUTO: 4 %
ERYTHROCYTE [DISTWIDTH] IN BLOOD BY AUTOMATED COUNT: 13.9 % (ref 12.3–15.4)
HCT VFR BLD AUTO: 37.6 % (ref 34–46.6)
HGB BLD-MCNC: 12.1 G/DL (ref 11.1–15.9)
IMM GRANULOCYTES # BLD AUTO: 0.1 X10E3/UL (ref 0–0.1)
IMM GRANULOCYTES NFR BLD AUTO: 1 %
LYMPHOCYTES # BLD AUTO: 1.3 X10E3/UL (ref 0.7–3.1)
LYMPHOCYTES NFR BLD AUTO: 18 %
MCH RBC QN AUTO: 28.9 PG (ref 26.6–33)
MCHC RBC AUTO-ENTMCNC: 32.2 G/DL (ref 31.5–35.7)
MCV RBC AUTO: 90 FL (ref 79–97)
MONOCYTES # BLD AUTO: 0.5 X10E3/UL (ref 0.1–0.9)
MONOCYTES NFR BLD AUTO: 6 %
NEUTROPHILS # BLD AUTO: 5.3 X10E3/UL (ref 1.4–7)
NEUTROPHILS NFR BLD AUTO: 70 %
PLATELET # BLD AUTO: 329 X10E3/UL (ref 150–450)
RBC # BLD AUTO: 4.19 X10E6/UL (ref 3.77–5.28)
WBC # BLD AUTO: 7.4 X10E3/UL (ref 3.4–10.8)

## 2019-11-06 ENCOUNTER — RESULTS ENCOUNTER (OUTPATIENT)
Dept: FAMILY MEDICINE CLINIC | Facility: CLINIC | Age: 74
End: 2019-11-06

## 2019-11-06 DIAGNOSIS — D72.819 LEUKOPENIA, UNSPECIFIED TYPE: ICD-10-CM

## 2019-11-07 LAB
BACTERIA UR CULT: ABNORMAL
BACTERIA UR CULT: ABNORMAL
OTHER ANTIBIOTIC SUSC ISLT: ABNORMAL

## 2019-11-08 RX ORDER — NITROFURANTOIN 25; 75 MG/1; MG/1
100 CAPSULE ORAL 2 TIMES DAILY
Qty: 6 CAPSULE | Refills: 0 | Status: SHIPPED | OUTPATIENT
Start: 2019-11-08 | End: 2020-04-23

## 2019-12-17 NOTE — PROGRESS NOTES
Lou Shaffer is a 74 y.o. female who is seen as a consult at the request of Dr. Velez for bump at anus  Last seen 2014 for bleeding hemorrhoids    HPI:    Pt states she has a hard knot on her bottom next to a hemorrhoid: first noted about a year ago.  Has not changed in size since she first noticed it  If she strains for a BM, it hurts a lot    She has intermittent rectal bleeding  She has other drainage that is blood-tinged     She has been using HC cream usually externally and occasionally internally from Dr. Velez, which has been helpful    She is on levothyroxine, managed by PCP Dr. Rico    Most recent colonoscopy Dr. Velez Feb 2019: wnl, bx showing collagenous colitis.  Tx with budesonide, but she d/c'ed 2-3 weeks ago because made her stools too firm    Past Medical History:   Diagnosis Date   • Acne    • Adnexal mass 2002    RIGHT, S/P JAZMIN BSO   • Anxiety    • Chronic headaches 01/04/2016    MRI OF BRAIN AT [facility] SHOWED PARTIAL OPACIFICATIONS OF THE FRONTAL ETHMOID AND MAXILLARY SINUSES AS WELL AS LEFT SPHENOID SINUS WITH FLUID AND MUCOSAL THICKENING. MINIMAL SMALL VESSEL DISEASE IN THE CEREBRAL WHITE MATTER. THE REMAINDER IS WNL, NO SIGNIFICANT CHANGE WHEN COMPARED TO PRIOR MRI ON 1/20/2009.   • Chronic low back pain    • Collagenous colitis 07/2019   • DDD (degenerative disc disease), cervical 2005   • Diarrhea 01/2019   • Disease of thyroid gland     HYPOTHYROIDISM   • Dizziness 06/11/2018    SEEN AT [facility] ER   • Dry skin    • Dysuria 11/2019   • Erosive gastritis with hemorrhage    • Fall 03/10/2014    CONTUSION OF COCCYX, SEEN AT [facility] ER   • GERD (gastroesophageal reflux disease)    • Glaucoma    • H/O hemorrhoids    • History of strabismus    • Hormone replacement therapy (HRT)    • Hot flashes    • Hyperlipidemia    • Hypertension    • Hypertensive emergency 01/11/2006    SEEN AT [facility] ER   • Insomnia    • Leukopenia 11/2019   • Night sweats 01/2018   • Positive fecal occult blood test  01/19/2018   • Post-menopausal    • PUD (peptic ulcer disease)    • Rectal bleeding 07/2019   • Scar of nose    • Sleep apnea    • Trigger thumb of both hands 04/2018   • Vitamin D deficiency        Past Surgical History:   Procedure Laterality Date   • COLONOSCOPY N/A 05/27/2010    NON BLEEDING INTERNAL HEMORRHOIDS, BX SHOWED COLLAGENOUS COLITIS, RESCOPE IN 5 YRS, DR. PEREZ   • COLONOSCOPY N/A 08/23/2004    WNL, RESCOPE IN 5 YRS, DR. CM HASTINGS AT [facility]   • COLONOSCOPY N/A 02/08/2019    ENTIRE COLON WNL, RANDOM BX: COLLAGENOUS COLITIS, DR. ROSEANN PEREZ AT [place]   • COLONOSCOPY N/A 02/26/2018    DIVERTICULOSIS THROUGHOUT SIGMOID, RESCOPE IN 5 YRS, DR. TAISHA MANNING AT [facility]   • ENDOSCOPY N/A 05/27/2010    EROSIVE GASTROPATHY, BX SHOWED SCATTERED HELIOBACTER LIKE ORGANISMS, DR. PEREZ AT [facility]   • EYE SURGERY      CORNEAL TRANSPLANT   • EYE SURGERY      STRABISMUS REPAIR BY RESECTION   • EYE SURGERY Right 02/23/2016    GLAUCOMA SURGERY, DR. RITA THORNE AT [place]   • HYSTERECTOMY N/A 02/25/2002    [facility] BSO, DR. MICKIE MCFADDEN AT [facility]   • LUMBAR DISCECTOMY ANTERIOR WITH ARTIFICIAL DISC IMPLANTATION N/A     L5-S1 DISCECTOMY   • OOPHORECTOMY Bilateral 02/25/2002    [facility] BSO, DR. MICKIE MCFADDEN AT [facility]   • TONSILLECTOMY AND ADENOIDECTOMY Bilateral        Social History:   reports that she has never smoked. She has never used smokeless tobacco. She reports that she drinks alcohol. She reports that she does not use drugs.      Marriage status:     Family History   Problem Relation Age of Onset   • Pneumonia Mother    • COPD Father    • Coronary artery disease Sister    • Breast cancer Sister    • Cancer Sister    • Stroke Brother         CVA   • Heart disease Brother    • Heart attack Brother    • Lung cancer Sister    • Cancer Sister          Current Outpatient Medications:   •  ALPRAZolam (XANAX) 0.5 MG tablet, Take 1 tablet by mouth Daily As Needed for Anxiety., Disp: 30 tablet, Rfl: 0  •   benazepril-hydrochlorthiazide (LOTENSIN HCT) 5-6.25 MG per tablet, TAKE 1 TABLET DAILY, Disp: 90 tablet, Rfl: 1  •  Budesonide (ENTOCORT EC) 3 MG 24 hr capsule, Take 9 mg by mouth Daily., Disp: , Rfl:   •  Cholecalciferol (VITAMIN D-3) 1000 UNITS capsule, Take by mouth., Disp: , Rfl:   •  fenofibrate 160 MG tablet, TAKE 1 TABLET DAILY, Disp: 90 tablet, Rfl: 1  •  hydrocortisone (ANUSOL-HC) 2.5 % rectal cream, , Disp: , Rfl:   •  levothyroxine (SYNTHROID, LEVOTHROID) 75 MCG tablet, TAKE 1 TABLET DAILY, Disp: 90 tablet, Rfl: 1  •  nitrofurantoin, macrocrystal-monohydrate, (MACROBID) 100 MG capsule, Take 1 capsule by mouth 2 (Two) Times a Day., Disp: 6 capsule, Rfl: 0  •  pantoprazole (PROTONIX) 40 MG EC tablet, TAKE 1 TABLET TWICE A DAY, Disp: 180 tablet, Rfl: 1  •  prednisoLONE acetate (PRED FORTE) 1 % ophthalmic suspension, , Disp: , Rfl:   •  Unable to find, Inject 1 drop into the eye Daily., Disp: , Rfl:     Allergy  Meperidine    Review of Systems   Constitution: Negative for decreased appetite and weight gain.   HENT: Negative for congestion, hearing loss and hoarse voice.    Eyes: Negative for blurred vision, discharge and visual disturbance.   Cardiovascular: Negative for chest pain, cyanosis and leg swelling.   Respiratory: Negative for cough, shortness of breath, sleep disturbances due to breathing and snoring.    Endocrine: Negative for cold intolerance and heat intolerance.   Hematologic/Lymphatic: Does not bruise/bleed easily.   Skin: Negative for itching, poor wound healing and skin cancer.   Musculoskeletal: Negative for arthritis, back pain, joint pain and joint swelling.   Gastrointestinal: Negative for abdominal pain, change in bowel habit, bowel incontinence and constipation.   Genitourinary: Negative for bladder incontinence, dysuria and hematuria.   Neurological: Negative for brief paralysis, excessive daytime sleepiness, focal weakness, headaches, light-headedness and weakness.    Psychiatric/Behavioral: Negative for altered mental status and hallucinations. The patient does not have insomnia.    Allergic/Immunologic: Negative for HIV exposure and persistent infections.       Vitals:    12/18/19 1025   BP: 110/80   Pulse: 82   Resp: 16   Temp: 97.6 °F (36.4 °C)   SpO2: 97%     Body mass index is 22.66 kg/m².    Physical Exam   Constitutional: She is oriented to person, place, and time. She appears well-developed and well-nourished. No distress.   HENT:   Head: Normocephalic and atraumatic.   Eyes: Pupils are equal, round, and reactive to light.   Neck: No tracheal deviation present.   Pulmonary/Chest: Effort normal. No respiratory distress.   Abdominal: She exhibits no distension.   Genitourinary:   Genitourinary Comments: Perianal exam: external hem - RA thickened nodule with appearance of irritation vs dysplasia  ANDREW- adequate tone, no masses  Anoscopy performed:  Grade 3 x 1 internal hem RA   Neurological: She is alert and oriented to person, place, and time.   Skin: Skin is warm and dry. She is not diaphoretic.   Psychiatric: She has a normal mood and affect. Her behavior is normal.   Vitals reviewed.      Review of Medical Record:  I reviewed records colonoscopy Dr. Velez Feb 2019: wnl, bx showing collagenous colitis    Assessment:  1. Anal polyp        Plan:    I recommend transanal excision of anal polyp with Dr. Agee.  I discussed risk including bleeding, infection, injury to sphincters; benefits; and alternatives.  I discussed in detail expected recovery, possible urinary retention, time off activities, and healing.  Patient expresses understanding and wishes to proceed.    She will need to have appt with Dr. Agee prior.      Zahra Dumont PA-C  12/18/2019     30 minutes spent face-to-face with patient; 20 of 30 minutes spent in consultation

## 2019-12-18 ENCOUNTER — OFFICE VISIT (OUTPATIENT)
Dept: SURGERY | Facility: CLINIC | Age: 74
End: 2019-12-18

## 2019-12-18 VITALS
DIASTOLIC BLOOD PRESSURE: 80 MMHG | HEART RATE: 82 BPM | BODY MASS INDEX: 22.8 KG/M2 | RESPIRATION RATE: 16 BRPM | OXYGEN SATURATION: 97 % | SYSTOLIC BLOOD PRESSURE: 110 MMHG | HEIGHT: 62 IN | TEMPERATURE: 97.6 F | WEIGHT: 123.9 LBS

## 2019-12-18 DIAGNOSIS — K62.0 ANAL POLYP: Primary | ICD-10-CM

## 2019-12-18 PROCEDURE — 46600 DIAGNOSTIC ANOSCOPY SPX: CPT | Performed by: PHYSICIAN ASSISTANT

## 2019-12-18 PROCEDURE — 99203 OFFICE O/P NEW LOW 30 MIN: CPT | Performed by: PHYSICIAN ASSISTANT

## 2020-01-15 ENCOUNTER — APPOINTMENT (OUTPATIENT)
Dept: PREADMISSION TESTING | Facility: HOSPITAL | Age: 75
End: 2020-01-15

## 2020-02-26 DIAGNOSIS — I10 ESSENTIAL HYPERTENSION: ICD-10-CM

## 2020-02-26 RX ORDER — BENAZEPRIL HYDROCHLORIDE AND HYDROCHLOROTHIAZIDE 5; 6.25 MG/1; MG/1
TABLET ORAL
Qty: 90 TABLET | Refills: 1 | Status: SHIPPED | OUTPATIENT
Start: 2020-02-26 | End: 2020-09-02

## 2020-02-26 RX ORDER — PANTOPRAZOLE SODIUM 40 MG/1
TABLET, DELAYED RELEASE ORAL
Qty: 180 TABLET | Refills: 1 | Status: SHIPPED | OUTPATIENT
Start: 2020-02-26 | End: 2020-08-20

## 2020-04-06 DIAGNOSIS — E03.9 ADULT HYPOTHYROIDISM: ICD-10-CM

## 2020-04-06 RX ORDER — LEVOTHYROXINE SODIUM 0.07 MG/1
TABLET ORAL
Qty: 90 TABLET | Refills: 1 | Status: SHIPPED | OUTPATIENT
Start: 2020-04-06 | End: 2020-10-28

## 2020-04-06 RX ORDER — FENOFIBRATE 160 MG/1
TABLET ORAL
Qty: 90 TABLET | Refills: 1 | Status: SHIPPED | OUTPATIENT
Start: 2020-04-06 | End: 2020-10-28

## 2020-04-22 DIAGNOSIS — F41.9 ANXIETY: ICD-10-CM

## 2020-04-22 RX ORDER — ALPRAZOLAM 0.5 MG/1
TABLET ORAL
Qty: 30 TABLET | Refills: 0 | OUTPATIENT
Start: 2020-04-22

## 2020-04-23 ENCOUNTER — OFFICE VISIT (OUTPATIENT)
Dept: FAMILY MEDICINE CLINIC | Facility: CLINIC | Age: 75
End: 2020-04-23

## 2020-04-23 DIAGNOSIS — F41.9 ANXIETY: ICD-10-CM

## 2020-04-23 PROCEDURE — 99213 OFFICE O/P EST LOW 20 MIN: CPT | Performed by: FAMILY MEDICINE

## 2020-04-23 RX ORDER — ALPRAZOLAM 0.5 MG/1
0.5 TABLET ORAL DAILY PRN
Qty: 30 TABLET | Refills: 0 | Status: SHIPPED | OUTPATIENT
Start: 2020-04-23 | End: 2020-07-23

## 2020-07-21 ENCOUNTER — TELEPHONE (OUTPATIENT)
Dept: FAMILY MEDICINE CLINIC | Facility: CLINIC | Age: 75
End: 2020-07-21

## 2020-07-21 NOTE — TELEPHONE ENCOUNTER
You can let her know this is really common and kind of hard to treat. She should stay active during the day and stretch her muscles. Make sure she is hydrating well with water. She can try a low dose of magnesium to take in the evening around 200 mg to see if that will help with the cramping as well.

## 2020-07-21 NOTE — TELEPHONE ENCOUNTER
PATIENT CALLED AND EXPERIENCING LEG CRAMPS DURING THE NIGHT.  PATIENT REQUESTING MEDICATION TO TREAT THE LEG CRAMPS.  PLEASE ADVISE    PATIENT CALL BACK NUMBER: [phone]    [facility] [place] [id number] [place] [address] AT [employer] [place] - [phone] [facility] [phone] FX

## 2020-07-22 DIAGNOSIS — F41.9 ANXIETY: ICD-10-CM

## 2020-07-23 RX ORDER — ALPRAZOLAM 0.5 MG/1
TABLET ORAL
Qty: 30 TABLET | Refills: 0 | Status: SHIPPED | OUTPATIENT
Start: 2020-07-23 | End: 2020-10-21

## 2020-07-23 NOTE — TELEPHONE ENCOUNTER
Spoke with this patient in detail. Voiced that she does all these interventions noted in previous message and states that the restlessness in her legs is getting worse. Some nights better than others. Please advise.

## 2020-07-23 NOTE — TELEPHONE ENCOUNTER
Probably have to do a visit. Restlessness is different from cramping to me. I will need to talk to her to see about other medications. Thanks.

## 2020-08-10 ENCOUNTER — OFFICE VISIT (OUTPATIENT)
Dept: FAMILY MEDICINE CLINIC | Facility: CLINIC | Age: 75
End: 2020-08-10

## 2020-08-10 ENCOUNTER — HOSPITAL ENCOUNTER (OUTPATIENT)
Dept: GENERAL RADIOLOGY | Facility: HOSPITAL | Age: 75
Discharge: HOME | End: 2020-08-10
Admitting: FAMILY MEDICINE

## 2020-08-10 VITALS
SYSTOLIC BLOOD PRESSURE: 112 MMHG | RESPIRATION RATE: 16 BRPM | BODY MASS INDEX: 22.76 KG/M2 | DIASTOLIC BLOOD PRESSURE: 74 MMHG | TEMPERATURE: 97.8 F | OXYGEN SATURATION: 98 % | HEART RATE: 80 BPM | WEIGHT: 123.7 LBS | HEIGHT: 62 IN

## 2020-08-10 DIAGNOSIS — M54.41 CHRONIC RIGHT-SIDED LOW BACK PAIN WITH RIGHT-SIDED SCIATICA: ICD-10-CM

## 2020-08-10 DIAGNOSIS — M25.551 PAIN IN RIGHT HIP: ICD-10-CM

## 2020-08-10 DIAGNOSIS — F41.9 ANXIETY: ICD-10-CM

## 2020-08-10 DIAGNOSIS — R25.2 LEG CRAMPS: Primary | ICD-10-CM

## 2020-08-10 DIAGNOSIS — G89.29 CHRONIC RIGHT-SIDED LOW BACK PAIN WITH RIGHT-SIDED SCIATICA: ICD-10-CM

## 2020-08-10 DIAGNOSIS — E03.9 ADULT HYPOTHYROIDISM: ICD-10-CM

## 2020-08-10 PROCEDURE — 99214 OFFICE O/P EST MOD 30 MIN: CPT | Performed by: FAMILY MEDICINE

## 2020-08-10 PROCEDURE — 72100 X-RAY EXAM L-S SPINE 2/3 VWS: CPT

## 2020-08-10 RX ORDER — BACLOFEN 10 MG/1
10 TABLET ORAL NIGHTLY PRN
Qty: 30 TABLET | Refills: 1 | Status: SHIPPED | OUTPATIENT
Start: 2020-08-10 | End: 2020-10-09

## 2020-08-10 NOTE — PROGRESS NOTES
Subjective   Lou Shaffer is a 74 y.o. female.     Chief Complaint   Patient presents with   • Muscle Pain     pt complains of lef and ankle cramping over the years she states has gotten worse over the last couple of months and especially at night         History of Present Illness  Leg pain  At night is worse. Stabbing, cramping, toes lock up.   Certain positions are bad. Does not pick legs up anymore.   She did take medication for this before, not much. Thinks it was helpful.  She says sometimes feels like bubbles moving under skin.  She lies on her back. If she stays there is ok, if turns, this will   She does paul in the morning and doing more stretching and thinks this is helping because has not had symptoms for a few days.     Anxiety  Using the alprazolam prn. She has tried multiple other medications but did not tolerate 2/2 severe GI side effects. She uses the alprazolam just prn and usually breaks the pill into three pieces and seldom takes more than 1/3 of a pill. Feels like it works remarkably well for her anxiety.     Said she was doing a lot of dancing in her low back.   When she stands a lot, right leg really bothers her. Says very painful. Deep ache and sometimes a stabbing in her leg. Posterior right hip bothers. Most days as the day goes on she will have pain. Worse over the last couple of months. Feels like she over did it. Cooking, running, dancing, playing games.   Surgery with Dr. Malik in about 1998.     The following portions of the patient's history were reviewed and updated as appropriate: allergies, current medications, past medical history, past social history and problem list.    Review of Systems   Constitutional: Negative for activity change, appetite change and unexpected weight change.   Musculoskeletal: Positive for arthralgias, back pain and myalgias. Negative for gait problem.   Neurological: Negative for weakness and numbness.   Psychiatric/Behavioral: Negative for sleep  "disturbance.       Objective   /74   Pulse 80   Temp 97.8 °F (36.6 °C) (Temporal)   Resp 16   Ht 157.5 cm (62.01\")   Wt 56.1 kg (123 lb 11.2 oz)   SpO2 98%   BMI 22.62 kg/m²   Physical Exam   Constitutional: She is oriented to person, place, and time. She appears well-nourished. No distress.   Eyes: Conjunctivae are normal. No scleral icterus.   Pulmonary/Chest: Effort normal. No respiratory distress.   Musculoskeletal: She exhibits no edema, tenderness or deformity.   She has 5/5 strength in BLE. Negative straight leg raise. Negative KASEY and FADIR. No TTP over the spine or low back on exam.   Neurological: She is alert and oriented to person, place, and time.   Skin: No rash noted. No erythema.   Psychiatric: She has a normal mood and affect. Her behavior is normal.   Vitals reviewed.      Assessment/Plan   Lou was seen today for muscle pain.    Diagnoses and all orders for this visit:    Leg cramps  -     CBC & Differential  -     Comprehensive Metabolic Panel  -     TSH  -     T4  -     Ferritin    Chronic right-sided low back pain with right-sided sciatica  -     XR Spine Lumbar 2 or 3 View; Future    Adult hypothyroidism  -     TSH  -     T4    Anxiety    Pain in right hip   -     Ferritin    Other orders  -     baclofen (LIORESAL) 10 MG tablet; Take 1 tablet by mouth At Night As Needed for Muscle Spasms.      We discussed her symptoms are chronic but certainly worse over the last 2-3 months. Seems to have gotten worse after her grandchildren visited for Memorial Day and she was a lot more active with them, running, dancing, standing and cooking more. She continues to do her exercising and has incorporated more stretching. She is doing some sciatica stretching she has from PT as many as 3 times daily. They do provide relief. At the end of the day and night time the cramps can happen. Describes the muscle cramps as very painful. R worse than L on her symptoms. Feels stabbing and throbbing " pain in her calf and around the ankle, toes will also cramp and draw up.   She has been on baclofen previously with some relief of the muscle cramps so we will have that available prn in the evenings.   Check in on labs.   With much worsening of old symptoms and increased activity triggering it as well as hx of Low back surgery, will get x ray today. We discussed going to PT for therapy but she wants to avoid going a lot of places still so she is going to continue with stretches at home fo now.   Follow up for her wellness in October.

## 2020-08-11 LAB
ALBUMIN SERPL-MCNC: 4.6 G/DL (ref 3.5–5.2)
ALBUMIN/GLOB SERPL: 2.6 G/DL
ALP SERPL-CCNC: 38 U/L (ref 39–117)
ALT SERPL-CCNC: 13 U/L (ref 1–33)
AST SERPL-CCNC: 17 U/L (ref 1–32)
BASOPHILS # BLD AUTO: 0.06 10*3/MM3 (ref 0–0.2)
BASOPHILS NFR BLD AUTO: 0.9 % (ref 0–1.5)
BILIRUB SERPL-MCNC: 0.3 MG/DL (ref 0–1.2)
BUN SERPL-MCNC: 16 MG/DL (ref 8–23)
BUN/CREAT SERPL: 23.2 (ref 7–25)
CALCIUM SERPL-MCNC: 9.7 MG/DL (ref 8.6–10.5)
CHLORIDE SERPL-SCNC: 98 MMOL/L (ref 98–107)
CO2 SERPL-SCNC: 26.7 MMOL/L (ref 22–29)
CREAT SERPL-MCNC: 0.69 MG/DL (ref 0.57–1)
EOSINOPHIL # BLD AUTO: 0.54 10*3/MM3 (ref 0–0.4)
EOSINOPHIL NFR BLD AUTO: 8 % (ref 0.3–6.2)
ERYTHROCYTE [DISTWIDTH] IN BLOOD BY AUTOMATED COUNT: 12.6 % (ref 12.3–15.4)
FERRITIN SERPL-MCNC: 23.6 NG/ML (ref 13–150)
GLOBULIN SER CALC-MCNC: 1.8 GM/DL
GLUCOSE SERPL-MCNC: 102 MG/DL (ref 65–99)
HCT VFR BLD AUTO: 38.3 % (ref 34–46.6)
HGB BLD-MCNC: 13 G/DL (ref 12–15.9)
IMM GRANULOCYTES # BLD AUTO: 0.04 10*3/MM3 (ref 0–0.05)
IMM GRANULOCYTES NFR BLD AUTO: 0.6 % (ref 0–0.5)
LYMPHOCYTES # BLD AUTO: 1.23 10*3/MM3 (ref 0.7–3.1)
LYMPHOCYTES NFR BLD AUTO: 18.1 % (ref 19.6–45.3)
MCH RBC QN AUTO: 29.5 PG (ref 26.6–33)
MCHC RBC AUTO-ENTMCNC: 33.9 G/DL (ref 31.5–35.7)
MCV RBC AUTO: 87 FL (ref 79–97)
MONOCYTES # BLD AUTO: 0.54 10*3/MM3 (ref 0.1–0.9)
MONOCYTES NFR BLD AUTO: 8 % (ref 5–12)
NEUTROPHILS # BLD AUTO: 4.37 10*3/MM3 (ref 1.7–7)
NEUTROPHILS NFR BLD AUTO: 64.4 % (ref 42.7–76)
NRBC BLD AUTO-RTO: 0 /100 WBC (ref 0–0.2)
PLATELET # BLD AUTO: 304 10*3/MM3 (ref 140–450)
POTASSIUM SERPL-SCNC: 4.3 MMOL/L (ref 3.5–5.2)
PROT SERPL-MCNC: 6.4 G/DL (ref 6–8.5)
RBC # BLD AUTO: 4.4 10*6/MM3 (ref 3.77–5.28)
SODIUM SERPL-SCNC: 136 MMOL/L (ref 136–145)
T4 SERPL-MCNC: 9.57 MCG/DL (ref 4.5–11.7)
TSH SERPL DL<=0.005 MIU/L-ACNC: 0.91 UIU/ML (ref 0.27–4.2)
WBC # BLD AUTO: 6.78 10*3/MM3 (ref 3.4–10.8)

## 2020-08-20 RX ORDER — PANTOPRAZOLE SODIUM 40 MG/1
TABLET, DELAYED RELEASE ORAL
Qty: 180 TABLET | Refills: 1 | Status: SHIPPED | OUTPATIENT
Start: 2020-08-20 | End: 2021-02-05

## 2020-09-02 DIAGNOSIS — I10 ESSENTIAL HYPERTENSION: ICD-10-CM

## 2020-09-03 RX ORDER — BENAZEPRIL HYDROCHLORIDE AND HYDROCHLOROTHIAZIDE 5; 6.25 MG/1; MG/1
TABLET ORAL
Qty: 90 TABLET | Refills: 1 | Status: SHIPPED | OUTPATIENT
Start: 2020-09-03 | End: 2021-02-28 | Stop reason: SINTOL

## 2020-09-14 DIAGNOSIS — M54.40 CHRONIC LOW BACK PAIN WITH SCIATICA, SCIATICA LATERALITY UNSPECIFIED, UNSPECIFIED BACK PAIN LATERALITY: Primary | ICD-10-CM

## 2020-09-14 DIAGNOSIS — G89.29 CHRONIC LOW BACK PAIN WITH SCIATICA, SCIATICA LATERALITY UNSPECIFIED, UNSPECIFIED BACK PAIN LATERALITY: Primary | ICD-10-CM

## 2020-10-09 RX ORDER — BACLOFEN 10 MG/1
TABLET ORAL
Qty: 30 TABLET | Refills: 0 | Status: SHIPPED | OUTPATIENT
Start: 2020-10-09 | End: 2020-11-06 | Stop reason: SDUPTHER

## 2020-10-20 DIAGNOSIS — F41.9 ANXIETY: ICD-10-CM

## 2020-10-21 RX ORDER — ALPRAZOLAM 0.5 MG/1
TABLET ORAL
Qty: 30 TABLET | Refills: 0 | Status: SHIPPED | OUTPATIENT
Start: 2020-10-21 | End: 2021-01-21

## 2020-10-28 DIAGNOSIS — E03.9 ADULT HYPOTHYROIDISM: ICD-10-CM

## 2020-10-28 RX ORDER — FENOFIBRATE 160 MG/1
TABLET ORAL
Qty: 90 TABLET | Refills: 1 | Status: SHIPPED | OUTPATIENT
Start: 2020-10-28 | End: 2021-04-30

## 2020-10-28 RX ORDER — LEVOTHYROXINE SODIUM 0.07 MG/1
TABLET ORAL
Qty: 90 TABLET | Refills: 1 | Status: SHIPPED | OUTPATIENT
Start: 2020-10-28 | End: 2021-04-30

## 2020-11-05 ENCOUNTER — TELEPHONE (OUTPATIENT)
Dept: FAMILY MEDICINE CLINIC | Facility: CLINIC | Age: 75
End: 2020-11-05

## 2020-11-05 NOTE — TELEPHONE ENCOUNTER
Health Maintenance Due   Topic Date Due   • Pneumococcal Vaccine 0-64 (1 of 3 - PCV13) [birth year]1983       Patient will discuss with           PATIENT TAKES 1 TABLET OF baclofen (LIORESAL) 10 MG tablet AND WANTS TO SEE IF SHE COULD TAKE 2 TABLETS.  WOULD LIKE TO USE [facility].    PLEASE ADVISE:  [phone]

## 2020-11-05 NOTE — TELEPHONE ENCOUNTER
Takes at bedtime says the dose is working fine until 3 am and then the cramping returns and she is unable to sleep or move in bed. Pt is asking for a higher dose or to double current dose. Please advise.

## 2020-11-05 NOTE — TELEPHONE ENCOUNTER
Spoke with Trisha and she advised ok to take 20 mg at bedtime. Spoke with patient and voiced understanding. Pt is asking for a new rx to be sent to mail order.

## 2020-11-06 RX ORDER — BACLOFEN 10 MG/1
10 TABLET ORAL NIGHTLY PRN
Qty: 30 TABLET | Refills: 0 | Status: SHIPPED | OUTPATIENT
Start: 2020-11-06 | End: 2020-11-30

## 2020-11-06 RX ORDER — BACLOFEN 10 MG/1
20 TABLET ORAL NIGHTLY PRN
Qty: 30 TABLET | Refills: 0 | OUTPATIENT
Start: 2020-11-06

## 2020-11-30 RX ORDER — BACLOFEN 10 MG/1
TABLET ORAL
Qty: 30 TABLET | Refills: 0 | Status: SHIPPED | OUTPATIENT
Start: 2020-11-30 | End: 2020-12-03 | Stop reason: SDUPTHER

## 2020-12-01 ENCOUNTER — TELEPHONE (OUTPATIENT)
Dept: FAMILY MEDICINE CLINIC | Facility: CLINIC | Age: 75
End: 2020-12-01

## 2020-12-01 NOTE — TELEPHONE ENCOUNTER
Patient called in wanting to know if she can take the Baclofen twice a day or get a stronger medication?    Best call back # [phone]

## 2020-12-01 NOTE — TELEPHONE ENCOUNTER
She can take it 2 or even 3 times per day. The maximum dosing for baclofen is 20 mg TID so she can even take two pills when she takes a dose 2 or 3 times per day. Thanks.

## 2020-12-03 RX ORDER — BACLOFEN 10 MG/1
20 TABLET ORAL NIGHTLY PRN
Qty: 90 TABLET | Refills: 0 | Status: CANCELLED | OUTPATIENT
Start: 2020-12-03

## 2020-12-03 RX ORDER — BACLOFEN 10 MG/1
10 TABLET ORAL 3 TIMES DAILY PRN
Qty: 90 TABLET | Refills: 1 | Status: SHIPPED | OUTPATIENT
Start: 2020-12-03 | End: 2021-04-23

## 2021-01-21 DIAGNOSIS — F41.9 ANXIETY: ICD-10-CM

## 2021-01-21 RX ORDER — ALPRAZOLAM 0.5 MG/1
TABLET ORAL
Qty: 30 TABLET | Refills: 0 | Status: SHIPPED | OUTPATIENT
Start: 2021-01-21 | End: 2021-04-23

## 2021-02-02 ENCOUNTER — OFFICE VISIT (OUTPATIENT)
Dept: FAMILY MEDICINE CLINIC | Facility: CLINIC | Age: 76
End: 2021-02-02

## 2021-02-02 VITALS
DIASTOLIC BLOOD PRESSURE: 62 MMHG | HEART RATE: 74 BPM | WEIGHT: 125.4 LBS | HEIGHT: 62 IN | OXYGEN SATURATION: 99 % | RESPIRATION RATE: 16 BRPM | TEMPERATURE: 96.8 F | SYSTOLIC BLOOD PRESSURE: 122 MMHG | BODY MASS INDEX: 23.08 KG/M2

## 2021-02-02 DIAGNOSIS — R42 VERTIGO: ICD-10-CM

## 2021-02-02 DIAGNOSIS — E03.9 ADULT HYPOTHYROIDISM: ICD-10-CM

## 2021-02-02 DIAGNOSIS — Z00.00 MEDICARE ANNUAL WELLNESS VISIT, SUBSEQUENT: Primary | ICD-10-CM

## 2021-02-02 DIAGNOSIS — Z11.59 ENCOUNTER FOR HEPATITIS C SCREENING TEST FOR LOW RISK PATIENT: ICD-10-CM

## 2021-02-02 DIAGNOSIS — E78.5 HYPERLIPIDEMIA, UNSPECIFIED HYPERLIPIDEMIA TYPE: ICD-10-CM

## 2021-02-02 PROCEDURE — 1126F AMNT PAIN NOTED NONE PRSNT: CPT | Performed by: FAMILY MEDICINE

## 2021-02-02 PROCEDURE — 1159F MED LIST DOCD IN RCRD: CPT | Performed by: FAMILY MEDICINE

## 2021-02-02 PROCEDURE — 1170F FXNL STATUS ASSESSED: CPT | Performed by: FAMILY MEDICINE

## 2021-02-02 PROCEDURE — 96160 PT-FOCUSED HLTH RISK ASSMT: CPT | Performed by: FAMILY MEDICINE

## 2021-02-02 PROCEDURE — G0439 PPPS, SUBSEQ VISIT: HCPCS | Performed by: FAMILY MEDICINE

## 2021-02-02 RX ORDER — OLOPATADINE HYDROCHLORIDE 1 MG/ML
SOLUTION/ DROPS OPHTHALMIC
COMMUNITY
Start: 2020-12-23

## 2021-02-02 RX ORDER — MECLIZINE HCL 12.5 MG/1
12.5 TABLET ORAL 3 TIMES DAILY PRN
Qty: 30 TABLET | Refills: 1 | Status: SHIPPED | OUTPATIENT
Start: 2021-02-02

## 2021-02-02 NOTE — PROGRESS NOTES
The ABCs of the Annual Wellness Visit  Subsequent Medicare Wellness Visit    Chief Complaint   Patient presents with   • Medicare Wellness-subsequent       Subjective   History of Present Illness:  Lou Shaffer is a 75 y.o. female who presents for a Subsequent Medicare Wellness Visit.    HEALTH RISK ASSESSMENT    Recent Hospitalizations:  No hospitalization(s) within the last year.    Current Medical Providers:  Patient Care Team:  Jocelyn Rico MD as PCP - General (Family Medicine)    Smoking Status:  Social History     Tobacco Use   Smoking Status Never Smoker   Smokeless Tobacco Never Used       Alcohol Consumption:  Social History     Substance and Sexual Activity   Alcohol Use Yes    Comment: MODERATE AMT       Depression Screen:   PHQ-2/PHQ-9 Depression Screening 2/2/2021   Little interest or pleasure in doing things 0   Feeling down, depressed, or hopeless 0   Total Score 0       Fall Risk Screen:  STEADI Fall Risk Assessment was completed, and patient is at LOW risk for falls.Assessment completed on:2/2/2021    Health Habits and Functional and Cognitive Screening:  Functional & Cognitive Status 2/2/2021   Do you have difficulty preparing food and eating? No   Do you have difficulty bathing yourself, getting dressed or grooming yourself? No   Do you have difficulty using the toilet? No   Do you have difficulty moving around from place to place? No   Do you have trouble with steps or getting out of a bed or a chair? No   Current Diet Well Balanced Diet   Dental Exam Not up to date   Eye Exam Up to date   Exercise (times per week) 7 times per week   Current Exercises Include Aerobics   Do you need help using the phone?  No   Are you deaf or do you have serious difficulty hearing?  No   Do you need help with transportation? No   Do you need help shopping? No   Do you need help preparing meals?  No   Do you need help with housework?  No   Do you need help with laundry? No   Do you need help taking your  medications? No   Do you need help managing money? No   Do you ever drive or ride in a car without wearing a seat belt? No   Have you felt unusual stress, anger or loneliness in the last month? No   Who do you live with? Spouse   If you need help, do you have trouble finding someone available to you? No   Have you been bothered in the last four weeks by sexual problems? No   Do you have difficulty concentrating, remembering or making decisions? No         Does the patient have evidence of cognitive impairment? No    Asprin use counseling:Does not need ASA (and currently is not on it)    Age-appropriate Screening Schedule:  Refer to the list below for future screening recommendations based on patient's age, sex and/or medical conditions. Orders for these recommended tests are listed in the plan section. The patient has been provided with a written plan.    Health Maintenance   Topic Date Due   • TDAP/TD VACCINES (1 - Tdap) 10/28/1963   • ZOSTER VACCINE (1 of 2) 10/28/1994   • INFLUENZA VACCINE  08/01/2020   • LIPID PANEL  10/31/2020   • MAMMOGRAM  08/23/2021   • COLONOSCOPY  02/08/2029          The following portions of the patient's history were reviewed and updated as appropriate: allergies, current medications, past family history, past medical history, past social history, past surgical history and problem list.    Outpatient Medications Prior to Visit   Medication Sig Dispense Refill   • ALPRAZolam (XANAX) 0.5 MG tablet TAKE ONE TABLET BY MOUTH DAILY AS NEEDED FOR ANXIETY 30 tablet 0   • baclofen (LIORESAL) 10 MG tablet Take 1 tablet by mouth 3 (Three) Times a Day As Needed for Muscle Spasms. 90 tablet 1   • benazepril-hydrochlorthiazide (LOTENSIN HCT) 5-6.25 MG per tablet TAKE 1 TABLET DAILY 90 tablet 1   • Budesonide (ENTOCORT EC) 3 MG 24 hr capsule Take 9 mg by mouth Daily.     • Cholecalciferol (VITAMIN D-3) 1000 UNITS capsule Take by mouth.     • fenofibrate 160 MG tablet TAKE 1 TABLET DAILY 90 tablet 1   •  "hydrocortisone (ANUSOL-HC) 2.5 % rectal cream      • levothyroxine (SYNTHROID, LEVOTHROID) 75 MCG tablet TAKE 1 TABLET DAILY 90 tablet 1   • olopatadine (PATANOL) 0.1 % ophthalmic solution      • pantoprazole (PROTONIX) 40 MG EC tablet TAKE 1 TABLET TWICE A  tablet 1   • prednisoLONE acetate (PRED FORTE) 1 % ophthalmic suspension      • Unable to find Inject 1 drop into the eye Daily.       No facility-administered medications prior to visit.        Patient Active Problem List   Diagnosis   • Anxiety   • Dry skin   • Hyperlipidemia   • Adult hypothyroidism   • Insomnia   • Vitamin D deficiency   • Hot flashes due to menopause   • Anal polyp       Advanced Care Planning:  ACP discussion was held with the patient during this visit. Patient has an advance directive (not in EMR), copy requested.    Review of Systems   Respiratory: Negative.    Cardiovascular: Negative.    Neurological: Negative.        Compared to one year ago, the patient feels her physical health is the same.  Compared to one year ago, the patient feels her mental health is the same.    Reviewed chart for potential of high risk medication in the elderly: yes  Reviewed chart for potential of harmful drug interactions in the elderly:yes    Objective         Vitals:    02/02/21 1146   BP: 122/62   BP Location: Left arm   Patient Position: Sitting   Cuff Size: Adult   Pulse: 74   Resp: 16   Temp: 96.8 °F (36 °C)   TempSrc: Infrared   SpO2: 99%   Weight: 56.9 kg (125 lb 6.4 oz)   Height: 157.5 cm (62.01\")   PainSc: 0-No pain       Body mass index is 22.93 kg/m².  Discussed the patient's BMI with her. The BMI is in the acceptable range.    Physical Exam  Vitals signs reviewed.   Constitutional:       General: She is not in acute distress.  HENT:      Right Ear: Tympanic membrane, ear canal and external ear normal.      Left Ear: Tympanic membrane, ear canal and external ear normal.      Nose: Nose normal.      Mouth/Throat:      Mouth: Mucous " membranes are moist.      Pharynx: Oropharynx is clear. No oropharyngeal exudate or posterior oropharyngeal erythema.   Eyes:      General: No scleral icterus.        Right eye: No discharge.         Left eye: No discharge.      Conjunctiva/sclera: Conjunctivae normal.   Neck:      Musculoskeletal: Neck supple.      Thyroid: No thyromegaly.      Vascular: No carotid bruit.   Cardiovascular:      Rate and Rhythm: Normal rate and regular rhythm.      Heart sounds: Normal heart sounds. No murmur. No friction rub. No gallop.    Pulmonary:      Effort: Pulmonary effort is normal. No respiratory distress.      Breath sounds: Normal breath sounds. No wheezing or rales.   Chest:      Chest wall: No tenderness.   Abdominal:      General: Bowel sounds are normal. There is no distension.      Palpations: Abdomen is soft. There is no mass.      Tenderness: There is no abdominal tenderness. There is no guarding.   Musculoskeletal:         General: No deformity.      Right lower leg: No edema.      Left lower leg: No edema.   Lymphadenopathy:      Cervical: No cervical adenopathy.   Skin:     General: Skin is warm and dry.   Neurological:      Mental Status: She is alert and oriented to person, place, and time.      Motor: No abnormal muscle tone.      Coordination: Coordination normal.   Psychiatric:         Mood and Affect: Mood normal.         Behavior: Behavior normal.         Lab Results   Component Value Date    GLU 81 02/02/2021    CHLPL 140 02/02/2021    TRIG 51 02/02/2021    HDL 88 02/02/2021    LDL 41 02/02/2021    VLDL 11 02/02/2021        Assessment/Plan   Medicare Risks and Personalized Health Plan  CMS Preventative Services Quick Reference  Advance Directive Discussion  Breast Cancer/Mammogram Screening  Colon Cancer Screening  Dementia/Memory   Depression/Dysphoria  Immunizations Discussed/Encouraged (specific immunizations; covid )    The above risks/problems have been discussed with the patient.  Pertinent  information has been shared with the patient in the After Visit Summary.  Follow up plans and orders are seen below in the Assessment/Plan Section.    Diagnoses and all orders for this visit:    1. Medicare annual wellness visit, subsequent (Primary)    2. Adult hypothyroidism  -     TSH  -     T4    3. Hyperlipidemia, unspecified hyperlipidemia type  -     CBC & Differential  -     Comprehensive Metabolic Panel  -     Lipid Panel    4. Vertigo  -     CBC & Differential  -     Comprehensive Metabolic Panel    5. Encounter for hepatitis C screening test for low risk patient  -     Hepatitis C Antibody    Other orders  -     meclizine (ANTIVERT) 12.5 MG tablet; Take 1 tablet by mouth 3 (Three) Times a Day As Needed for Dizziness.  Dispense: 30 tablet; Refill: 1      Follow Up:  No follow-ups on file.     An After Visit Summary and PPPS were given to the patient.         Vertigo  Hx of. Says comes and goes. For three weeks now.   Says she is doing the Epley maneuver and usually takes care of it but is not helping this time.   Once morning she felt a little sick. No vomiting.   Has symptoms thorughout the day. Happens daily right now. Says can start just when she is sitting and will have a little blip, does not last long.   Says she is doing her same activities. No problems with appetite, eating or sleeping.   She does paul in the morning and stretches at night and if she turns her head real fast and most of the time does not get dizzy. If she does she gets vertigo.  She would like some meclizine. Sounds overall improving some residual, encouraged to continue the stretches.

## 2021-02-03 LAB
ALBUMIN SERPL-MCNC: 4.4 G/DL (ref 3.7–4.7)
ALBUMIN/GLOB SERPL: 1.8 {RATIO} (ref 1.2–2.2)
ALP SERPL-CCNC: 39 IU/L (ref 39–117)
ALT SERPL-CCNC: 13 IU/L (ref 0–32)
AST SERPL-CCNC: 18 IU/L (ref 0–40)
BASOPHILS # BLD AUTO: 0.1 X10E3/UL (ref 0–0.2)
BASOPHILS NFR BLD AUTO: 1 %
BILIRUB SERPL-MCNC: 0.5 MG/DL (ref 0–1.2)
BUN SERPL-MCNC: 19 MG/DL (ref 8–27)
BUN/CREAT SERPL: 28 (ref 12–28)
CALCIUM SERPL-MCNC: 9.9 MG/DL (ref 8.7–10.3)
CHLORIDE SERPL-SCNC: 94 MMOL/L (ref 96–106)
CHOLEST SERPL-MCNC: 140 MG/DL (ref 100–199)
CO2 SERPL-SCNC: 24 MMOL/L (ref 20–29)
CREAT SERPL-MCNC: 0.69 MG/DL (ref 0.57–1)
EOSINOPHIL # BLD AUTO: 0.5 X10E3/UL (ref 0–0.4)
EOSINOPHIL NFR BLD AUTO: 7 %
ERYTHROCYTE [DISTWIDTH] IN BLOOD BY AUTOMATED COUNT: 12.9 % (ref 11.7–15.4)
GLOBULIN SER CALC-MCNC: 2.4 G/DL (ref 1.5–4.5)
GLUCOSE SERPL-MCNC: 81 MG/DL (ref 65–99)
HCT VFR BLD AUTO: 37.6 % (ref 34–46.6)
HCV AB S/CO SERPL IA: <0.1 S/CO RATIO (ref 0–0.9)
HDLC SERPL-MCNC: 88 MG/DL
HGB BLD-MCNC: 12.6 G/DL (ref 11.1–15.9)
IMM GRANULOCYTES # BLD AUTO: 0 X10E3/UL (ref 0–0.1)
IMM GRANULOCYTES NFR BLD AUTO: 1 %
LDLC SERPL CALC-MCNC: 41 MG/DL (ref 0–99)
LYMPHOCYTES # BLD AUTO: 1.4 X10E3/UL (ref 0.7–3.1)
LYMPHOCYTES NFR BLD AUTO: 20 %
MCH RBC QN AUTO: 30.4 PG (ref 26.6–33)
MCHC RBC AUTO-ENTMCNC: 33.5 G/DL (ref 31.5–35.7)
MCV RBC AUTO: 91 FL (ref 79–97)
MONOCYTES # BLD AUTO: 0.6 X10E3/UL (ref 0.1–0.9)
MONOCYTES NFR BLD AUTO: 8 %
NEUTROPHILS # BLD AUTO: 4.6 X10E3/UL (ref 1.4–7)
NEUTROPHILS NFR BLD AUTO: 63 %
PLATELET # BLD AUTO: 273 X10E3/UL (ref 150–450)
POTASSIUM SERPL-SCNC: 4.2 MMOL/L (ref 3.5–5.2)
PROT SERPL-MCNC: 6.8 G/DL (ref 6–8.5)
RBC # BLD AUTO: 4.15 X10E6/UL (ref 3.77–5.28)
SODIUM SERPL-SCNC: 133 MMOL/L (ref 134–144)
T4 SERPL-MCNC: 9.5 UG/DL (ref 4.5–12)
TRIGL SERPL-MCNC: 51 MG/DL (ref 0–149)
TSH SERPL DL<=0.005 MIU/L-ACNC: 1.19 UIU/ML (ref 0.45–4.5)
VLDLC SERPL CALC-MCNC: 11 MG/DL (ref 5–40)
WBC # BLD AUTO: 7.1 X10E3/UL (ref 3.4–10.8)

## 2021-02-05 RX ORDER — PANTOPRAZOLE SODIUM 40 MG/1
TABLET, DELAYED RELEASE ORAL
Qty: 180 TABLET | Refills: 1 | Status: SHIPPED | OUTPATIENT
Start: 2021-02-05 | End: 2021-07-13

## 2021-02-24 ENCOUNTER — TELEPHONE (OUTPATIENT)
Dept: FAMILY MEDICINE CLINIC | Facility: CLINIC | Age: 76
End: 2021-02-24

## 2021-02-28 RX ORDER — BENAZEPRIL HYDROCHLORIDE 5 MG/1
5 TABLET, FILM COATED ORAL NIGHTLY
Qty: 90 TABLET | Refills: 1 | Status: SHIPPED | OUTPATIENT
Start: 2021-02-28 | End: 2021-05-19 | Stop reason: SDUPTHER

## 2021-03-09 DIAGNOSIS — Z23 IMMUNIZATION DUE: ICD-10-CM

## 2021-04-22 DIAGNOSIS — F41.9 ANXIETY: ICD-10-CM

## 2021-04-23 RX ORDER — ALPRAZOLAM 0.5 MG/1
TABLET ORAL
Qty: 30 TABLET | Refills: 0 | Status: SHIPPED | OUTPATIENT
Start: 2021-04-23 | End: 2021-07-28

## 2021-04-23 RX ORDER — BACLOFEN 10 MG/1
TABLET ORAL
Qty: 90 TABLET | Refills: 0 | Status: SHIPPED | OUTPATIENT
Start: 2021-04-23 | End: 2021-07-09

## 2021-04-29 DIAGNOSIS — E03.9 ADULT HYPOTHYROIDISM: ICD-10-CM

## 2021-04-29 DIAGNOSIS — I10 ESSENTIAL HYPERTENSION: ICD-10-CM

## 2021-04-30 RX ORDER — FENOFIBRATE 160 MG/1
TABLET ORAL
Qty: 90 TABLET | Refills: 3 | Status: SHIPPED | OUTPATIENT
Start: 2021-04-30 | End: 2022-05-16

## 2021-04-30 RX ORDER — LEVOTHYROXINE SODIUM 0.07 MG/1
TABLET ORAL
Qty: 90 TABLET | Refills: 3 | Status: SHIPPED | OUTPATIENT
Start: 2021-04-30 | End: 2022-04-07

## 2021-04-30 RX ORDER — BENAZEPRIL HYDROCHLORIDE AND HYDROCHLOROTHIAZIDE 5; 6.25 MG/1; MG/1
TABLET ORAL
Qty: 90 TABLET | Refills: 3 | OUTPATIENT
Start: 2021-04-30

## 2021-05-19 RX ORDER — BENAZEPRIL HYDROCHLORIDE 5 MG/1
5 TABLET, FILM COATED ORAL NIGHTLY
Qty: 90 TABLET | Refills: 1 | Status: SHIPPED | OUTPATIENT
Start: 2021-05-19 | End: 2021-11-22

## 2021-05-19 NOTE — TELEPHONE ENCOUNTER
Caller: Lou Shaffer    Relationship: Self    Best call back number: [phone]    Medication needed:   Requested Prescriptions     Pending Prescriptions Disp Refills   • benazepril (LOTENSIN) 5 MG tablet 90 tablet 1     Sig: Take 1 tablet by mouth Every Night.       When do you need the refill by: ASAP    Does the patient have less than a 3 day supply:  [] Yes  [x] No    What is the patient's preferred pharmacy: [facility] - [address] [phone] [facility] [phone]

## 2021-07-09 RX ORDER — BACLOFEN 10 MG/1
TABLET ORAL
Qty: 90 TABLET | Refills: 0 | Status: SHIPPED | OUTPATIENT
Start: 2021-07-09 | End: 2021-08-26

## 2021-07-13 RX ORDER — PANTOPRAZOLE SODIUM 40 MG/1
TABLET, DELAYED RELEASE ORAL
Qty: 180 TABLET | Refills: 0 | Status: SHIPPED | OUTPATIENT
Start: 2021-07-13 | End: 2021-11-22

## 2021-07-24 DIAGNOSIS — F41.9 ANXIETY: ICD-10-CM

## 2021-07-28 RX ORDER — ALPRAZOLAM 0.5 MG/1
TABLET ORAL
Qty: 30 TABLET | Refills: 0 | Status: SHIPPED | OUTPATIENT
Start: 2021-07-28 | End: 2021-10-28

## 2021-08-02 ENCOUNTER — OFFICE VISIT (OUTPATIENT)
Dept: FAMILY MEDICINE CLINIC | Facility: CLINIC | Age: 76
End: 2021-08-02

## 2021-08-02 VITALS
SYSTOLIC BLOOD PRESSURE: 120 MMHG | DIASTOLIC BLOOD PRESSURE: 82 MMHG | OXYGEN SATURATION: 93 % | TEMPERATURE: 97.1 F | BODY MASS INDEX: 22.84 KG/M2 | RESPIRATION RATE: 15 BRPM | HEIGHT: 62 IN | WEIGHT: 124.1 LBS | HEART RATE: 60 BPM

## 2021-08-02 DIAGNOSIS — M54.41 CHRONIC BILATERAL LOW BACK PAIN WITH RIGHT-SIDED SCIATICA: ICD-10-CM

## 2021-08-02 DIAGNOSIS — F41.9 ANXIETY: Primary | ICD-10-CM

## 2021-08-02 DIAGNOSIS — G89.29 CHRONIC BILATERAL LOW BACK PAIN WITH RIGHT-SIDED SCIATICA: ICD-10-CM

## 2021-08-02 PROBLEM — R19.7 DIARRHEA: Status: ACTIVE | Noted: 2019-01-30

## 2021-08-02 PROCEDURE — 99213 OFFICE O/P EST LOW 20 MIN: CPT | Performed by: FAMILY MEDICINE

## 2021-08-02 RX ORDER — SODIUM FLUORIDE 6 MG/ML
PASTE, DENTIFRICE DENTAL
COMMUNITY
Start: 2021-07-24

## 2021-08-02 RX ORDER — MAGNESIUM OXIDE 400 MG/1
400 TABLET ORAL 2 TIMES DAILY
COMMUNITY

## 2021-08-02 NOTE — PROGRESS NOTES
"Chief Complaint  Med Management (6 MFU)    Subjective          Lou Shaffer presents to [facility]  History of Present Illness  Anxiety  She is doing well on current dose and frequency. She says she uses it most mornings. Takes the edge off. She takes 1/3 of the pill.   She is very anxious in the morning. Some mornings she doesn't take it and does ok. She sometimes can tell herself just to get over it and that works sometimes.     She is doing stretching at night and exercises in the morning. She feels this helps her back and sciatica. She said used to hurt to be on the treadmill and now doesn't bother her back and now does now.   She uses baclofen at night for muscle cramps. She doubled up on her magnesium not sure exactly what is helping but this is getting better. She also uses theraworx topically and says this helps.     Objective   Vital Signs:   /82 (BP Location: Right arm, Patient Position: Sitting, Cuff Size: Small Adult)   Pulse 60   Temp 97.1 °F (36.2 °C) (Infrared)   Resp 15   Ht 157.5 cm (62.01\")   Wt 56.3 kg (124 lb 1.6 oz)   SpO2 93%   BMI 22.69 kg/m²     Physical Exam  Vitals reviewed.   Constitutional:       General: She is not in acute distress.  Eyes:      General: No scleral icterus.        Right eye: No discharge.         Left eye: No discharge.      Conjunctiva/sclera: Conjunctivae normal.   Cardiovascular:      Rate and Rhythm: Normal rate and regular rhythm.      Heart sounds: Normal heart sounds. No murmur heard.   No friction rub. No gallop.    Pulmonary:      Effort: Pulmonary effort is normal. No respiratory distress.      Breath sounds: Normal breath sounds. No wheezing.   Neurological:      Mental Status: She is alert and oriented to person, place, and time.   Psychiatric:         Behavior: Behavior normal.        Result Review :                 Assessment and Plan    Diagnoses and all orders for this visit:    1. Anxiety (Primary)    2. " Chronic bilateral low back pain with right-sided sciatica        Follow Up   No follow-ups on file.  Patient was given instructions and counseling regarding her condition or for health maintenance advice. Please see specific information pulled into the AVS if appropriate.     She is doing well with periodic alprazolam for significant anxiety. She exercises every morning which helps her and also talks herself through some stressful things that helps but sometimes she just needs a dose. Will take half and that will help too.     Her back bothers her but improved with the PT she knows. Does consistently.

## 2021-08-15 PROBLEM — G89.29 CHRONIC BILATERAL LOW BACK PAIN WITH RIGHT-SIDED SCIATICA: Status: ACTIVE | Noted: 2021-08-15

## 2021-08-15 PROBLEM — M54.41 CHRONIC BILATERAL LOW BACK PAIN WITH RIGHT-SIDED SCIATICA: Status: ACTIVE | Noted: 2021-08-15

## 2021-08-26 RX ORDER — BACLOFEN 10 MG/1
TABLET ORAL
Qty: 90 TABLET | Refills: 0 | Status: SHIPPED | OUTPATIENT
Start: 2021-08-26 | End: 2021-11-09 | Stop reason: SDUPTHER

## 2021-08-26 NOTE — TELEPHONE ENCOUNTER
Rx Refill Note  Requested Prescriptions     Pending Prescriptions Disp Refills   • baclofen (LIORESAL) 10 MG tablet [Pharmacy Med Name: BACLOFEN 10 MG TABLET] 90 tablet 0     Sig: TAKE ONE TABLET BY MOUTH THREE TIMES A DAY AS NEEDED FOR MUSCLE SPASMS      Last office visit with prescribing clinician: 8/2/2021      Next office visit with prescribing clinician: 2/10/2022            Katelin Ledezma LPN  08/26/21, 16:48 EDT

## 2021-10-28 DIAGNOSIS — F41.9 ANXIETY: ICD-10-CM

## 2021-10-28 NOTE — TELEPHONE ENCOUNTER
Rx Refill Note  Requested Prescriptions     Pending Prescriptions Disp Refills   • ALPRAZolam (XANAX) 0.5 MG tablet [Pharmacy Med Name: ALPRAZolam 0.5 MG TABLET] 30 tablet      Sig: TAKE ONE TABLET BY MOUTH DAILY AS NEEDED FOR ANXIETY      Last office visit with prescribing clinician: 8/2/2021      Next office visit with prescribing clinician: 2/10/2022            Ramsey Martinez MA  10/28/21, 11:24 EDT

## 2021-10-30 RX ORDER — ALPRAZOLAM 0.5 MG/1
TABLET ORAL
Qty: 30 TABLET | Refills: 0 | Status: SHIPPED | OUTPATIENT
Start: 2021-10-30 | End: 2022-01-31

## 2021-11-09 RX ORDER — BACLOFEN 10 MG/1
10 TABLET ORAL 3 TIMES DAILY PRN
Qty: 90 TABLET | Refills: 2 | Status: SHIPPED | OUTPATIENT
Start: 2021-11-09 | End: 2022-04-07

## 2021-11-09 NOTE — TELEPHONE ENCOUNTER
Medication requested (name and dose): baclofen (LIORESAL) 10 MG tablet    Pharmacy where request should be sent: [facility] - [address] [phone] [facility] [phone]      Additional details provided by patient: PT HAS  4 DAYS LEFT BUT WANTS TO USE [facility]     Best call back number: [phone]    Does the patient have less than a 3 day supply:  [] Yes  [x] No    Elvia Godinez  11/09/21, 11:00 EST

## 2021-11-09 NOTE — TELEPHONE ENCOUNTER
Rx Refill Note  Requested Prescriptions     Pending Prescriptions Disp Refills   • baclofen (LIORESAL) 10 MG tablet 90 tablet 0     Sig: Take 1 tablet by mouth 3 (Three) Times a Day As Needed for Muscle Spasms.      Last office visit with prescribing clinician: 8/2/2021      Next office visit with prescribing clinician: 2/10/2022            Katelin Ledezma LPN  11/09/21, 13:19 EST

## 2021-11-22 RX ORDER — PANTOPRAZOLE SODIUM 40 MG/1
TABLET, DELAYED RELEASE ORAL
Qty: 180 TABLET | Refills: 3 | Status: SHIPPED | OUTPATIENT
Start: 2021-11-22 | End: 2022-11-21

## 2021-11-22 RX ORDER — BENAZEPRIL HYDROCHLORIDE 5 MG/1
TABLET, FILM COATED ORAL
Qty: 90 TABLET | Refills: 3 | Status: SHIPPED | OUTPATIENT
Start: 2021-11-22 | End: 2022-11-21

## 2021-11-22 NOTE — TELEPHONE ENCOUNTER
Rx Refill Note  Requested Prescriptions     Pending Prescriptions Disp Refills   • benazepril (LOTENSIN) 5 MG tablet [Pharmacy Med Name: BENAZEPRIL HCL TABS 5MG] 90 tablet 3     Sig: TAKE 1 TABLET EVERY NIGHT   • pantoprazole (PROTONIX) 40 MG EC tablet [Pharmacy Med Name: PANTOPRAZOLE SODIUM DR TABS 40MG] 180 tablet 3     Sig: TAKE 1 TABLET TWICE A DAY      Last office visit with prescribing clinician: 8/2/2021      Next office visit with prescribing clinician: 2/10/2022            Katelin Ledezma LPN  11/22/21, 13:51 EST

## 2021-12-28 ENCOUNTER — HOSPITAL ENCOUNTER (EMERGENCY)
Facility: HOSPITAL | Age: 76
Discharge: HOME | End: 2021-12-28
Attending: EMERGENCY MEDICINE | Admitting: EMERGENCY MEDICINE

## 2021-12-28 VITALS
OXYGEN SATURATION: 97 % | TEMPERATURE: 98.5 F | DIASTOLIC BLOOD PRESSURE: 80 MMHG | SYSTOLIC BLOOD PRESSURE: 136 MMHG | HEART RATE: 82 BPM | RESPIRATION RATE: 16 BRPM

## 2021-12-28 DIAGNOSIS — S81.811A LACERATION OF RIGHT LOWER LEG, INITIAL ENCOUNTER: Primary | ICD-10-CM

## 2021-12-28 PROCEDURE — 99283 EMERGENCY DEPT VISIT LOW MDM: CPT

## 2021-12-28 PROCEDURE — 90471 IMMUNIZATION ADMIN: CPT | Performed by: EMERGENCY MEDICINE

## 2021-12-28 PROCEDURE — 90715 TDAP VACCINE 7 YRS/> IM: CPT | Performed by: EMERGENCY MEDICINE

## 2021-12-28 PROCEDURE — 25010000002 TETANUS-DIPHTH-ACELL PERTUSSIS 5-2.5-18.5 LF-MCG/0.5 SUSPENSION PREFILLED SYRINGE: Performed by: EMERGENCY MEDICINE

## 2021-12-28 RX ORDER — LIDOCAINE HYDROCHLORIDE AND EPINEPHRINE 10; 10 MG/ML; UG/ML
10 INJECTION, SOLUTION INFILTRATION; PERINEURAL ONCE
Status: COMPLETED | OUTPATIENT
Start: 2021-12-28 | End: 2021-12-28

## 2021-12-28 RX ADMIN — TETANUS TOXOID, REDUCED DIPHTHERIA TOXOID AND ACELLULAR PERTUSSIS VACCINE, ADSORBED 0.5 ML: 5; 2.5; 8; 8; 2.5 SUSPENSION INTRAMUSCULAR at 12:32

## 2021-12-28 RX ADMIN — LIDOCAINE HYDROCHLORIDE,EPINEPHRINE BITARTRATE 10 ML: 10; .01 INJECTION, SOLUTION INFILTRATION; PERINEURAL at 12:34

## 2022-01-10 ENCOUNTER — OFFICE VISIT (OUTPATIENT)
Dept: WOUND CARE | Facility: HOSPITAL | Age: 77
End: 2022-01-10

## 2022-01-10 PROCEDURE — 97602 WOUND(S) CARE NON-SELECTIVE: CPT

## 2022-01-10 PROCEDURE — G0463 HOSPITAL OUTPT CLINIC VISIT: HCPCS

## 2022-01-17 ENCOUNTER — OFFICE VISIT (OUTPATIENT)
Dept: WOUND CARE | Facility: HOSPITAL | Age: 77
End: 2022-01-17

## 2022-01-18 ENCOUNTER — TELEPHONE (OUTPATIENT)
Dept: FAMILY MEDICINE CLINIC | Facility: CLINIC | Age: 77
End: 2022-01-18

## 2022-01-18 NOTE — TELEPHONE ENCOUNTER
I called and spoke to patient about her coming in today for an EKG.  I asked her about this and she said for a long time now she is feeling a demetrius pattern her heart and she gets lightheaded.  She has not had this worked up before but is been going on for a long time now and she wants to be evaluated.  She made an appointment to see me at the beginning of February but they told her she had to come in separately and before her appointment to get her EKG.  I told her it be better if we just did all of those things together on 10 February so I could talk to her about her symptoms and she will probably need some things in addition to the EKG.  We can do the EKG and review it together while were in the office.  She liked this plan and was agreeable and she will not come today but we will do everything on February 10.

## 2022-01-24 ENCOUNTER — OFFICE VISIT (OUTPATIENT)
Dept: WOUND CARE | Facility: HOSPITAL | Age: 77
End: 2022-01-24

## 2022-01-31 ENCOUNTER — APPOINTMENT (OUTPATIENT)
Dept: WOUND CARE | Facility: HOSPITAL | Age: 77
End: 2022-01-31

## 2022-01-31 ENCOUNTER — OFFICE VISIT (OUTPATIENT)
Dept: WOUND CARE | Facility: HOSPITAL | Age: 77
End: 2022-01-31

## 2022-01-31 DIAGNOSIS — F41.9 ANXIETY: ICD-10-CM

## 2022-01-31 RX ORDER — ALPRAZOLAM 0.5 MG/1
TABLET ORAL
Qty: 30 TABLET | Refills: 0 | Status: SHIPPED | OUTPATIENT
Start: 2022-01-31 | End: 2022-05-02

## 2022-01-31 NOTE — TELEPHONE ENCOUNTER
Rx Refill Note  Requested Prescriptions     Pending Prescriptions Disp Refills   • ALPRAZolam (XANAX) 0.5 MG tablet [Pharmacy Med Name: ALPRAZolam 0.5 MG TABLET] 30 tablet      Sig: TAKE ONE TABLET BY MOUTH DAILY AS NEEDED FOR ANXIETY      Last office visit with prescribing clinician: 8/2/2021      Next office visit with prescribing clinician: 2/10/2022            Katelin Ledezma LPN  01/31/22, 12:40 EST

## 2022-02-07 ENCOUNTER — OFFICE VISIT (OUTPATIENT)
Dept: WOUND CARE | Facility: HOSPITAL | Age: 77
End: 2022-02-07

## 2022-02-10 ENCOUNTER — OFFICE VISIT (OUTPATIENT)
Dept: FAMILY MEDICINE CLINIC | Facility: CLINIC | Age: 77
End: 2022-02-10

## 2022-02-10 VITALS
SYSTOLIC BLOOD PRESSURE: 112 MMHG | HEART RATE: 90 BPM | TEMPERATURE: 97.1 F | HEIGHT: 62 IN | DIASTOLIC BLOOD PRESSURE: 84 MMHG | BODY MASS INDEX: 23.24 KG/M2 | WEIGHT: 126.3 LBS | OXYGEN SATURATION: 97 % | RESPIRATION RATE: 17 BRPM

## 2022-02-10 DIAGNOSIS — R00.2 PALPITATIONS: ICD-10-CM

## 2022-02-10 DIAGNOSIS — E55.9 VITAMIN D DEFICIENCY: ICD-10-CM

## 2022-02-10 DIAGNOSIS — M25.562 ACUTE PAIN OF LEFT KNEE: ICD-10-CM

## 2022-02-10 DIAGNOSIS — E78.5 HYPERLIPIDEMIA, UNSPECIFIED HYPERLIPIDEMIA TYPE: ICD-10-CM

## 2022-02-10 DIAGNOSIS — Z12.31 ENCOUNTER FOR SCREENING MAMMOGRAM FOR MALIGNANT NEOPLASM OF BREAST: ICD-10-CM

## 2022-02-10 DIAGNOSIS — Z00.00 MEDICARE ANNUAL WELLNESS VISIT, SUBSEQUENT: Primary | ICD-10-CM

## 2022-02-10 DIAGNOSIS — R42 LIGHTHEADED: ICD-10-CM

## 2022-02-10 DIAGNOSIS — E03.9 ADULT HYPOTHYROIDISM: ICD-10-CM

## 2022-02-10 PROCEDURE — 1170F FXNL STATUS ASSESSED: CPT | Performed by: FAMILY MEDICINE

## 2022-02-10 PROCEDURE — 1160F RVW MEDS BY RX/DR IN RCRD: CPT | Performed by: FAMILY MEDICINE

## 2022-02-10 PROCEDURE — 93000 ELECTROCARDIOGRAM COMPLETE: CPT | Performed by: FAMILY MEDICINE

## 2022-02-10 PROCEDURE — 99213 OFFICE O/P EST LOW 20 MIN: CPT | Performed by: FAMILY MEDICINE

## 2022-02-10 PROCEDURE — G0439 PPPS, SUBSEQ VISIT: HCPCS | Performed by: FAMILY MEDICINE

## 2022-02-10 PROCEDURE — 96160 PT-FOCUSED HLTH RISK ASSMT: CPT | Performed by: FAMILY MEDICINE

## 2022-02-10 RX ORDER — DESONIDE 0.5 MG/G
CREAM TOPICAL
COMMUNITY
Start: 2021-12-17

## 2022-02-11 ENCOUNTER — TRANSCRIBE ORDERS (OUTPATIENT)
Dept: FAMILY MEDICINE CLINIC | Facility: CLINIC | Age: 77
End: 2022-02-11

## 2022-02-11 DIAGNOSIS — Z12.31 SCREENING MAMMOGRAM, ENCOUNTER FOR: Primary | ICD-10-CM

## 2022-02-11 LAB
25(OH)D3+25(OH)D2 SERPL-MCNC: 44.8 NG/ML (ref 30–100)
ALBUMIN SERPL-MCNC: 4.7 G/DL (ref 3.7–4.7)
ALBUMIN/GLOB SERPL: 2.2 {RATIO} (ref 1.2–2.2)
ALP SERPL-CCNC: 48 IU/L (ref 44–121)
ALT SERPL-CCNC: 14 IU/L (ref 0–32)
AST SERPL-CCNC: 14 IU/L (ref 0–40)
BASOPHILS # BLD AUTO: 0.1 X10E3/UL (ref 0–0.2)
BASOPHILS NFR BLD AUTO: 1 %
BILIRUB SERPL-MCNC: 0.3 MG/DL (ref 0–1.2)
BUN SERPL-MCNC: 19 MG/DL (ref 8–27)
BUN/CREAT SERPL: 28 (ref 12–28)
CALCIUM SERPL-MCNC: 10 MG/DL (ref 8.7–10.3)
CHLORIDE SERPL-SCNC: 101 MMOL/L (ref 96–106)
CHOLEST SERPL-MCNC: 152 MG/DL (ref 100–199)
CO2 SERPL-SCNC: 19 MMOL/L (ref 20–29)
CREAT SERPL-MCNC: 0.68 MG/DL (ref 0.57–1)
EOSINOPHIL # BLD AUTO: 0.5 X10E3/UL (ref 0–0.4)
EOSINOPHIL NFR BLD AUTO: 6 %
ERYTHROCYTE [DISTWIDTH] IN BLOOD BY AUTOMATED COUNT: 13.4 % (ref 11.7–15.4)
GLOBULIN SER CALC-MCNC: 2.1 G/DL (ref 1.5–4.5)
GLUCOSE SERPL-MCNC: 89 MG/DL (ref 65–99)
HCT VFR BLD AUTO: 35.9 % (ref 34–46.6)
HDLC SERPL-MCNC: 84 MG/DL
HGB BLD-MCNC: 11.7 G/DL (ref 11.1–15.9)
IMM GRANULOCYTES # BLD AUTO: 0.1 X10E3/UL (ref 0–0.1)
IMM GRANULOCYTES NFR BLD AUTO: 1 %
LDLC SERPL CALC-MCNC: 47 MG/DL (ref 0–99)
LYMPHOCYTES # BLD AUTO: 1.4 X10E3/UL (ref 0.7–3.1)
LYMPHOCYTES NFR BLD AUTO: 17 %
MCH RBC QN AUTO: 28.1 PG (ref 26.6–33)
MCHC RBC AUTO-ENTMCNC: 32.6 G/DL (ref 31.5–35.7)
MCV RBC AUTO: 86 FL (ref 79–97)
MONOCYTES # BLD AUTO: 0.6 X10E3/UL (ref 0.1–0.9)
MONOCYTES NFR BLD AUTO: 7 %
NEUTROPHILS # BLD AUTO: 5.8 X10E3/UL (ref 1.4–7)
NEUTROPHILS NFR BLD AUTO: 68 %
PLATELET # BLD AUTO: 360 X10E3/UL (ref 150–450)
POTASSIUM SERPL-SCNC: 4.6 MMOL/L (ref 3.5–5.2)
PROT SERPL-MCNC: 6.8 G/DL (ref 6–8.5)
RBC # BLD AUTO: 4.17 X10E6/UL (ref 3.77–5.28)
SODIUM SERPL-SCNC: 137 MMOL/L (ref 134–144)
T4 SERPL-MCNC: 9.6 UG/DL (ref 4.5–12)
TRIGL SERPL-MCNC: 120 MG/DL (ref 0–149)
TSH SERPL DL<=0.005 MIU/L-ACNC: 1.22 UIU/ML (ref 0.45–4.5)
VLDLC SERPL CALC-MCNC: 21 MG/DL (ref 5–40)
WBC # BLD AUTO: 8.5 X10E3/UL (ref 3.4–10.8)

## 2022-02-14 ENCOUNTER — OFFICE VISIT (OUTPATIENT)
Dept: WOUND CARE | Facility: HOSPITAL | Age: 77
End: 2022-02-14

## 2022-02-21 ENCOUNTER — APPOINTMENT (OUTPATIENT)
Dept: WOUND CARE | Facility: HOSPITAL | Age: 77
End: 2022-02-21

## 2022-02-28 ENCOUNTER — OFFICE VISIT (OUTPATIENT)
Dept: WOUND CARE | Facility: HOSPITAL | Age: 77
End: 2022-02-28

## 2022-03-07 ENCOUNTER — OFFICE VISIT (OUTPATIENT)
Dept: WOUND CARE | Facility: HOSPITAL | Age: 77
End: 2022-03-07

## 2022-03-10 ENCOUNTER — HOSPITAL ENCOUNTER (OUTPATIENT)
Dept: MAMMOGRAPHY | Facility: HOSPITAL | Age: 77
Discharge: HOME | End: 2022-03-10
Admitting: FAMILY MEDICINE

## 2022-03-10 DIAGNOSIS — Z12.31 SCREENING MAMMOGRAM, ENCOUNTER FOR: ICD-10-CM

## 2022-03-10 PROCEDURE — 77067 SCR MAMMO BI INCL CAD: CPT

## 2022-03-10 PROCEDURE — 77063 BREAST TOMOSYNTHESIS BI: CPT

## 2022-03-14 ENCOUNTER — OFFICE VISIT (OUTPATIENT)
Dept: WOUND CARE | Facility: HOSPITAL | Age: 77
End: 2022-03-14

## 2022-03-14 PROCEDURE — G0463 HOSPITAL OUTPT CLINIC VISIT: HCPCS

## 2022-03-16 NOTE — PROGRESS NOTES
Procedure     ECG 12 Lead    Date/Time: 2/10/2022 12:02 PM  Performed by: Jocelyn Rico MD  Authorized by: Jocelyn Rico MD   Comparison: compared with previous ECG from 6/11/2018  Similar to previous ECG  Rhythm: sinus rhythm  Rate: normal  Conduction: conduction normal  ST Segments: ST segments normal  QRS axis: normal    Clinical impression: normal ECG

## 2022-04-07 DIAGNOSIS — E03.9 ADULT HYPOTHYROIDISM: ICD-10-CM

## 2022-04-07 RX ORDER — BACLOFEN 10 MG/1
TABLET ORAL
Qty: 90 TABLET | Refills: 1 | Status: SHIPPED | OUTPATIENT
Start: 2022-04-07 | End: 2022-07-28

## 2022-04-07 RX ORDER — LEVOTHYROXINE SODIUM 0.07 MG/1
TABLET ORAL
Qty: 90 TABLET | Refills: 1 | Status: SHIPPED | OUTPATIENT
Start: 2022-04-07 | End: 2022-10-04

## 2022-05-02 DIAGNOSIS — F41.9 ANXIETY: ICD-10-CM

## 2022-05-02 RX ORDER — ALPRAZOLAM 0.5 MG/1
TABLET ORAL
Qty: 30 TABLET | Refills: 0 | Status: SHIPPED | OUTPATIENT
Start: 2022-05-02 | End: 2022-08-03

## 2022-05-02 NOTE — TELEPHONE ENCOUNTER
Rx Refill Note  Requested Prescriptions     Pending Prescriptions Disp Refills   • ALPRAZolam (XANAX) 0.5 MG tablet [Pharmacy Med Name: ALPRAZolam 0.5 MG TABLET] 30 tablet      Sig: TAKE ONE TABLET BY MOUTH DAILY AS NEEDED FOR ANXIETY      Last office visit with prescribing clinician: 2/10/2022      Next office visit with prescribing clinician: 8/11/2022            Katelin Ledezma LPN  05/02/22, 11:17 EDT

## 2022-05-16 RX ORDER — FENOFIBRATE 160 MG/1
TABLET ORAL
Qty: 90 TABLET | Refills: 3 | Status: SHIPPED | OUTPATIENT
Start: 2022-05-16 | End: 2023-02-17

## 2022-05-16 NOTE — TELEPHONE ENCOUNTER
Rx Refill Note  Requested Prescriptions     Pending Prescriptions Disp Refills   • fenofibrate 160 MG tablet [Pharmacy Med Name: FENOFIBRATE TABS 160MG] 90 tablet 3     Sig: TAKE 1 TABLET DAILY      Last office visit with prescribing clinician: 2/10/2022      Next office visit with prescribing clinician: 8/11/2022            Katelin Ledezma LPN  05/16/22, 13:44 EDT

## 2022-05-27 NOTE — TELEPHONE ENCOUNTER
Rx Refill Note  Requested Prescriptions     Pending Prescriptions Disp Refills   • Diclofenac Sodium (VOLTAREN) 1 % gel gel [Pharmacy Med Name: DICLOFENAC SODIUM 1% GEL] 100 g      Sig: APPLY 4 GRAMS TOPICALLY TO THE APPROPRIATE AREA AS DIRECTED FOUR TIMES A DAY AS NEEDED FOR KNEE PAIN      Last office visit with prescribing clinician: 2/10/2022      Next office visit with prescribing clinician: 8/11/2022            Katelin Ledezma LPN  05/27/22, 09:38 EDT

## 2022-07-28 RX ORDER — BACLOFEN 10 MG/1
TABLET ORAL
Qty: 90 TABLET | Refills: 0 | Status: SHIPPED | OUTPATIENT
Start: 2022-07-28 | End: 2022-10-04

## 2022-08-03 DIAGNOSIS — F41.9 ANXIETY: ICD-10-CM

## 2022-08-03 RX ORDER — ALPRAZOLAM 0.5 MG/1
TABLET ORAL
Qty: 30 TABLET | Refills: 0 | Status: SHIPPED | OUTPATIENT
Start: 2022-08-03 | End: 2022-08-17

## 2022-08-03 NOTE — TELEPHONE ENCOUNTER
Rx Refill Note  Requested Prescriptions     Pending Prescriptions Disp Refills   • ALPRAZolam (XANAX) 0.5 MG tablet [Pharmacy Med Name: ALPRAZolam 0.5 MG TABLET] 30 tablet      Sig: TAKE ONE TABLET BY MOUTH DAILY AS NEEDED FOR ANXIETY      Last office visit with prescribing clinician: 2/10/2022      Next office visit with prescribing clinician: 8/11/2022       {TIP  Please add Last Relevant Lab Date if appropriate: 02/10/22    Sweta Archuleta MA  08/03/22, 10:15 EDT

## 2022-08-17 ENCOUNTER — OFFICE VISIT (OUTPATIENT)
Dept: FAMILY MEDICINE CLINIC | Facility: CLINIC | Age: 77
End: 2022-08-17

## 2022-08-17 VITALS
HEART RATE: 75 BPM | OXYGEN SATURATION: 98 % | DIASTOLIC BLOOD PRESSURE: 90 MMHG | HEIGHT: 62 IN | TEMPERATURE: 95.5 F | BODY MASS INDEX: 22.89 KG/M2 | SYSTOLIC BLOOD PRESSURE: 130 MMHG | WEIGHT: 124.4 LBS

## 2022-08-17 DIAGNOSIS — G89.29 CHRONIC BILATERAL LOW BACK PAIN WITH RIGHT-SIDED SCIATICA: ICD-10-CM

## 2022-08-17 DIAGNOSIS — F41.9 ANXIETY: Primary | ICD-10-CM

## 2022-08-17 DIAGNOSIS — M54.41 CHRONIC BILATERAL LOW BACK PAIN WITH RIGHT-SIDED SCIATICA: ICD-10-CM

## 2022-08-17 PROBLEM — H40.2234: Status: ACTIVE | Noted: 2022-07-21

## 2022-08-17 PROBLEM — H18.233: Status: ACTIVE | Noted: 2022-07-21

## 2022-08-17 PROCEDURE — 99214 OFFICE O/P EST MOD 30 MIN: CPT | Performed by: FAMILY MEDICINE

## 2022-08-17 RX ORDER — ALPRAZOLAM 0.5 MG/1
0.5 TABLET ORAL DAILY PRN
Qty: 40 TABLET | Refills: 0
Start: 2022-08-17 | End: 2022-11-03

## 2022-09-12 ENCOUNTER — TELEPHONE (OUTPATIENT)
Dept: FAMILY MEDICINE CLINIC | Facility: CLINIC | Age: 77
End: 2022-09-12

## 2022-09-12 DIAGNOSIS — G89.29 CHRONIC BILATERAL LOW BACK PAIN WITH RIGHT-SIDED SCIATICA: Primary | ICD-10-CM

## 2022-09-12 DIAGNOSIS — M25.562 CHRONIC PAIN OF LEFT KNEE: ICD-10-CM

## 2022-09-12 DIAGNOSIS — G89.29 CHRONIC PAIN OF LEFT KNEE: ICD-10-CM

## 2022-09-12 DIAGNOSIS — M54.41 CHRONIC BILATERAL LOW BACK PAIN WITH RIGHT-SIDED SCIATICA: Primary | ICD-10-CM

## 2022-09-12 NOTE — TELEPHONE ENCOUNTER
Caller: Lou Shaffer    Relationship: Self    Best call back number:[phone]    What is the medical concern/diagnosis: BACK PAIN AND LEFT KNEE PAIN     What specialty or service is being requested: DOCTOR FOR LEG PAIN AND BACK PAIN   Any additional details: PLEASE CALL AND ADVISE WHAT HER NEXT STEPS SHOULD BE

## 2022-09-19 ENCOUNTER — TELEPHONE (OUTPATIENT)
Dept: FAMILY MEDICINE CLINIC | Facility: CLINIC | Age: 77
End: 2022-09-19

## 2022-09-19 NOTE — TELEPHONE ENCOUNTER
Caller: Lou Shaffer    Relationship: Self    Best call back number:[phone]       What orders are you requesting (i.e. lab or imaging): MRI     In what timeframe would the patient need to come in: ASAP     Where will you receive your lab/imaging services: [facility] [address]     Additional notes: PATIENT STATES SHE MENTIONED GETTING AN MRI TO DR BERNARDO AND SHE IS WANTING TO GET THIS ORDER PUT IN AND SCHEDULED.    PATIENT WOULD ALSO LIKE TO KNOW IF SHE COULD GET A MRI FROM HER WAIST DOWN IF THAT'S POSSIBLE

## 2022-09-20 DIAGNOSIS — M54.41 CHRONIC BILATERAL LOW BACK PAIN WITH RIGHT-SIDED SCIATICA: Primary | ICD-10-CM

## 2022-09-20 DIAGNOSIS — G89.29 CHRONIC BILATERAL LOW BACK PAIN WITH RIGHT-SIDED SCIATICA: Primary | ICD-10-CM

## 2022-09-20 NOTE — TELEPHONE ENCOUNTER
I ordered an MRI of the lumbar spine because of her back symptoms and the right sided sciatica. This will given us information about the legs but we will not get pictures of the legs.

## 2022-10-04 DIAGNOSIS — E03.9 ADULT HYPOTHYROIDISM: ICD-10-CM

## 2022-10-04 RX ORDER — LEVOTHYROXINE SODIUM 0.07 MG/1
TABLET ORAL
Qty: 90 TABLET | Refills: 3 | Status: SHIPPED | OUTPATIENT
Start: 2022-10-04

## 2022-10-04 RX ORDER — BACLOFEN 10 MG/1
TABLET ORAL
Qty: 90 TABLET | Refills: 11 | Status: SHIPPED | OUTPATIENT
Start: 2022-10-04

## 2022-10-04 NOTE — TELEPHONE ENCOUNTER
Rx Refill Note  Requested Prescriptions     Pending Prescriptions Disp Refills   • baclofen (LIORESAL) 10 MG tablet [Pharmacy Med Name: BACLOFEN TABS 10MG] 90 tablet 11     Sig: TAKE 1 TABLET THREE TIMES A DAY AS NEEDED FOR MUSCLE SPASMS      Last office visit with prescribing clinician: 8/17/2022      Next office visit with prescribing clinician: 2/16/2023            Katelin Ledezma LPN  10/04/22, 12:33 EDT

## 2022-10-04 NOTE — TELEPHONE ENCOUNTER
Rx Refill Note  Requested Prescriptions     Pending Prescriptions Disp Refills   • levothyroxine (SYNTHROID, LEVOTHROID) 75 MCG tablet [Pharmacy Med Name: L-THYROXINE (SYNTHROID) TABS 75MCG] 90 tablet 3     Sig: TAKE 1 TABLET DAILY      Last office visit with prescribing clinician: 08/17/22  Next office visit with prescribing clinician: 02/16/23      {TIP  Please add Last Relevant Lab Date if appropriate: 02/10/22    Sweta Archuleta MA  10/04/22, 09:58 EDT

## 2022-10-10 ENCOUNTER — APPOINTMENT (OUTPATIENT)
Dept: GENERAL RADIOLOGY | Facility: HOSPITAL | Age: 77
End: 2022-10-10

## 2022-10-10 ENCOUNTER — HOSPITAL ENCOUNTER (EMERGENCY)
Facility: HOSPITAL | Age: 77
Discharge: HOME | End: 2022-10-10
Attending: EMERGENCY MEDICINE | Admitting: EMERGENCY MEDICINE

## 2022-10-10 VITALS
HEART RATE: 81 BPM | HEIGHT: 62 IN | TEMPERATURE: 97.8 F | DIASTOLIC BLOOD PRESSURE: 88 MMHG | OXYGEN SATURATION: 96 % | SYSTOLIC BLOOD PRESSURE: 121 MMHG | BODY MASS INDEX: 22.88 KG/M2 | WEIGHT: 124.34 LBS | RESPIRATION RATE: 18 BRPM

## 2022-10-10 DIAGNOSIS — M25.562 ACUTE PAIN OF LEFT KNEE: Primary | ICD-10-CM

## 2022-10-10 DIAGNOSIS — M17.12 OSTEOARTHRITIS OF LEFT KNEE, UNSPECIFIED OSTEOARTHRITIS TYPE: ICD-10-CM

## 2022-10-10 LAB
ANION GAP SERPL CALCULATED.3IONS-SCNC: 9 MMOL/L (ref 5–15)
BASOPHILS # BLD AUTO: 0.07 10*3/MM3 (ref 0–0.2)
BASOPHILS NFR BLD AUTO: 1 % (ref 0–1.5)
BUN SERPL-MCNC: 17 MG/DL (ref 8–23)
BUN/CREAT SERPL: 22.4 (ref 7–25)
CALCIUM SPEC-SCNC: 9.9 MG/DL (ref 8.6–10.5)
CHLORIDE SERPL-SCNC: 100 MMOL/L (ref 98–107)
CO2 SERPL-SCNC: 26 MMOL/L (ref 22–29)
CREAT SERPL-MCNC: 0.76 MG/DL (ref 0.57–1)
D DIMER PPP FEU-MCNC: 0.62 MCGFEU/ML (ref 0–0.49)
DEPRECATED RDW RBC AUTO: 41.1 FL (ref 37–54)
EGFRCR SERPLBLD CKD-EPI 2021: 80.8 ML/MIN/1.73
EOSINOPHIL # BLD AUTO: 0.4 10*3/MM3 (ref 0–0.4)
EOSINOPHIL NFR BLD AUTO: 5.8 % (ref 0.3–6.2)
ERYTHROCYTE [DISTWIDTH] IN BLOOD BY AUTOMATED COUNT: 14.2 % (ref 12.3–15.4)
GLUCOSE SERPL-MCNC: 82 MG/DL (ref 65–99)
HCT VFR BLD AUTO: 33.5 % (ref 34–46.6)
HGB BLD-MCNC: 10.5 G/DL (ref 12–15.9)
IMM GRANULOCYTES # BLD AUTO: 0.04 10*3/MM3 (ref 0–0.05)
IMM GRANULOCYTES NFR BLD AUTO: 0.6 % (ref 0–0.5)
LYMPHOCYTES # BLD AUTO: 1.11 10*3/MM3 (ref 0.7–3.1)
LYMPHOCYTES NFR BLD AUTO: 16.2 % (ref 19.6–45.3)
MCH RBC QN AUTO: 25.4 PG (ref 26.6–33)
MCHC RBC AUTO-ENTMCNC: 31.3 G/DL (ref 31.5–35.7)
MCV RBC AUTO: 81.1 FL (ref 79–97)
MONOCYTES # BLD AUTO: 0.62 10*3/MM3 (ref 0.1–0.9)
MONOCYTES NFR BLD AUTO: 9 % (ref 5–12)
NEUTROPHILS NFR BLD AUTO: 4.63 10*3/MM3 (ref 1.7–7)
NEUTROPHILS NFR BLD AUTO: 67.4 % (ref 42.7–76)
NRBC BLD AUTO-RTO: 0 /100 WBC (ref 0–0.2)
PLATELET # BLD AUTO: 410 10*3/MM3 (ref 140–450)
PMV BLD AUTO: 9.4 FL (ref 6–12)
POTASSIUM SERPL-SCNC: 4.4 MMOL/L (ref 3.5–5.2)
RBC # BLD AUTO: 4.13 10*6/MM3 (ref 3.77–5.28)
SODIUM SERPL-SCNC: 135 MMOL/L (ref 136–145)
WBC NRBC COR # BLD: 6.87 10*3/MM3 (ref 3.4–10.8)

## 2022-10-10 PROCEDURE — 73562 X-RAY EXAM OF KNEE 3: CPT

## 2022-10-10 PROCEDURE — 85379 FIBRIN DEGRADATION QUANT: CPT | Performed by: EMERGENCY MEDICINE

## 2022-10-10 PROCEDURE — 85025 COMPLETE CBC W/AUTO DIFF WBC: CPT | Performed by: EMERGENCY MEDICINE

## 2022-10-10 PROCEDURE — 80048 BASIC METABOLIC PNL TOTAL CA: CPT | Performed by: EMERGENCY MEDICINE

## 2022-10-10 PROCEDURE — 99284 EMERGENCY DEPT VISIT MOD MDM: CPT

## 2022-10-10 PROCEDURE — 73590 X-RAY EXAM OF LOWER LEG: CPT

## 2022-10-10 RX ORDER — NAPROXEN 500 MG/1
500 TABLET ORAL 2 TIMES DAILY WITH MEALS
Qty: 20 TABLET | Refills: 0 | Status: SHIPPED | OUTPATIENT
Start: 2022-10-10

## 2022-10-10 NOTE — ED NOTES
Pt presents to ED via EMS from home. Pt reports swelling and pain to L knee. PT reports this started one month ago, and got worse last night. PT was able to ambulate with EMS. Pt denies any falls or injuries. Pt is A&OX4, able to ambulate, and in a mask at this time.     Patient was placed in face mask during first look triage.  Patient was wearing a face mask throughout encounter.  I wore personal protective equipment throughout the encounter.  Hand hygiene was performed before and after patient encounter.

## 2022-10-10 NOTE — ED PROVIDER NOTES
EMERGENCY DEPARTMENT ENCOUNTER    Room Number:  37/37  Date of encounter:  10/10/2022  PCP: Jocelyn Rico MD  Historian: Patient     I used full protective equipment while examining this patient.  This includes face mask, gloves and protective eyewear.  I washed my hands before entering the room and immediately upon leaving the room      HPI:  Chief Complaint: Left leg pain  A complete HPI/ROS/PMH/PSH/SH/FH are unobtainable due to: None    Context: Lou Shaffer is a 76 y.o. female who presents to the ED c/o left leg pain.  Patient has had ongoing left leg pain for several weeks.  Symptoms have gotten worse gradually.  She does do Lashon every day.  She used to run but has not been running much lately.  She states she was going to try to see Dr. Jocelyn Rico to get an MRI but came to the ER because she is having trouble sleeping at night.  At night when she is laying in the bed she has pain through the left leg including thigh knee and calf.  She gets cramps through the night that prevents her from sleeping.  Symptoms are currently mild but or severe through the night last night.      MEDICAL RECORD REVIEW  I reviewed prior medical records including most recent office visit with Dr. Jocelyn Rico.  Patient has a history of anxiety and chronic low back pain.    PAST MEDICAL HISTORY  Active Ambulatory Problems     Diagnosis Date Noted   • Anxiety 06/13/2016   • Dry skin 06/13/2016   • Hyperlipidemia 06/13/2016   • Adult hypothyroidism 06/13/2016   • Insomnia 06/13/2016   • Vitamin D deficiency 06/13/2016   • Hot flashes due to menopause 04/12/2018   • Anal polyp 12/18/2019   • Diarrhea 01/30/2019   • Chronic bilateral low back pain with right-sided sciatica 08/15/2021   • Chronic angle-closure glaucoma, bilateral, indeterminate stage 07/21/2022   • Secondary corneal edema, bilateral 07/21/2022     Resolved Ambulatory Problems     Diagnosis Date Noted   • No Resolved Ambulatory Problems     Past Medical History:    Diagnosis Date   • Acne    • Adnexal mass 2002   • Breast cancer (HCC)    • Chronic headaches 01/04/2016   • Chronic low back pain    • Collagenous colitis 07/2019   • DDD (degenerative disc disease), cervical 2005   • Disease of thyroid gland    • Dizziness 06/11/2018   • Dysuria 11/2019   • Erosive gastritis with hemorrhage    • Fall 03/10/2014   • GERD (gastroesophageal reflux disease)    • Glaucoma    • H/O hemorrhoids    • History of strabismus    • Hormone replacement therapy (HRT)    • Hot flashes    • Hypertension    • Hypertensive emergency 01/11/2006   • Leukopenia 11/2019   • Night sweats 01/2018   • Positive fecal occult blood test 01/19/2018   • Post-menopausal    • PUD (peptic ulcer disease)    • Rectal bleeding 07/2019   • Scar of nose    • Sleep apnea    • Trigger thumb of both hands 04/2018         PAST SURGICAL HISTORY  Past Surgical History:   Procedure Laterality Date   • COLONOSCOPY N/A 05/27/2010    NON BLEEDING INTERNAL HEMORRHOIDS, BX SHOWED COLLAGENOUS COLITIS, RESCOPE IN 5 YRS, DR. PEREZ   • COLONOSCOPY N/A 08/23/2004    WNL, RESCOPE IN 5 YRS, DR. CM HASTINGS AT [facility]   • COLONOSCOPY N/A 02/08/2019    ENTIRE COLON WNL, RANDOM BX: COLLAGENOUS COLITIS, DR. ROSEANN PEREZ AT [place]   • COLONOSCOPY N/A 02/26/2018    DIVERTICULOSIS THROUGHOUT SIGMOID, RESCOPE IN 5 YRS, DR. TAISHA MANNING AT [facility]   • ENDOSCOPY N/A 05/27/2010    EROSIVE GASTROPATHY, BX SHOWED SCATTERED HELIOBACTER LIKE ORGANISMS, DR. PEREZ AT [facility]   • EYE SURGERY      CORNEAL TRANSPLANT   • EYE SURGERY      STRABISMUS REPAIR BY RESECTION   • EYE SURGERY Right 02/23/2016    GLAUCOMA SURGERY, DR. RITA THORNE AT [place]   • HYSTERECTOMY N/A 02/25/2002    [facility] BSO, DR. MICKIE MCFADDEN AT [facility]   • LUMBAR DISCECTOMY ANTERIOR WITH ARTIFICIAL DISC IMPLANTATION N/A 1998    L5-S1 DISCECTOMY, Dr. Malik   • OOPHORECTOMY Bilateral 02/25/2002    [facility] JAQUIO, DR. MICKIE MCFADDEN AT [facility]   • TONSILLECTOMY AND ADENOIDECTOMY Bilateral           FAMILY HISTORY  Family History   Problem Relation Age of Onset   • Pneumonia Mother    • COPD Father    • Coronary artery disease Sister    • Breast cancer Sister    • Cancer Sister    • Lung cancer Sister    • Cancer Sister    • Stroke Brother         CVA   • Heart disease Brother    • Heart attack Brother    • Ovarian cancer Neg Hx          SOCIAL HISTORY  Social History     Socioeconomic History   • Marital status:    Tobacco Use   • Smoking status: Never   • Smokeless tobacco: Never   Substance and Sexual Activity   • Alcohol use: Yes     Comment: MODERATE AMT   • Drug use: No   • Sexual activity: Yes     Partners: Male         ALLERGIES  Patient has no known allergies.       REVIEW OF SYSTEMS  Review of Systems   Constitutional: Negative.  Negative for fever.   HENT: Negative.  Negative for sore throat.    Eyes: Negative.    Respiratory: Negative.  Negative for cough.    Cardiovascular: Negative.  Negative for chest pain.   Gastrointestinal: Negative.    Genitourinary: Negative.  Negative for dysuria.   Musculoskeletal: Positive for arthralgias and back pain (Chronic back pain not significantly worse recently).        Left leg pain as per HPI   Skin: Negative.  Negative for rash.   Neurological: Negative.  Negative for headaches.   All other systems reviewed and are negative.          PHYSICAL EXAM    I have reviewed the triage vital signs and nursing notes.    ED Triage Vitals [10/10/22 1013]   Temp Heart Rate Resp BP SpO2   97.8 °F (36.6 °C) 81 18 134/88 99 %      Temp src Heart Rate Source Patient Position BP Location FiO2 (%)   Tympanic Monitor Sitting Right arm --       Physical Exam  GENERAL: Alert female no obvious distress.  Triage vitals reviewed and are benign  HENT: nares patent  EYES: no scleral icterus  CV: regular rhythm, regular rate  RESPIRATORY: normal effort, clear to auscultation bilaterally  ABDOMEN: soft, nontender to palpation  MUSCULOSKELETAL: Spine-no significant  tenderness to palpation, good range of motion  Upper extremities-atraumatic and unremarkable  Lower extremities- left hip with good range of motion and no significant tenderness to palpation.  There is mild to moderate diffuse swelling of the left knee.  Range of motion is good.  There is no noted ligamentous laxity.  There is apparent mild effusion.  Examination of the left ankle and foot are unremarkable with good strength sensation and pulses.  NEURO: Strength sensation and coordination are grossly intact.  Speech and mentation are unremarkable  SKIN: warm, dry      LAB RESULTS  Recent Results (from the past 24 hour(s))   Basic Metabolic Panel    Collection Time: 10/10/22 11:15 AM    Specimen: Blood   Result Value Ref Range    Glucose 82 65 - 99 mg/dL    BUN 17 8 - 23 mg/dL    Creatinine 0.76 0.57 - 1.00 mg/dL    Sodium 135 (L) 136 - 145 mmol/L    Potassium 4.4 3.5 - 5.2 mmol/L    Chloride 100 98 - 107 mmol/L    CO2 26.0 22.0 - 29.0 mmol/L    Calcium 9.9 8.6 - 10.5 mg/dL    BUN/Creatinine Ratio 22.4 7.0 - 25.0    Anion Gap 9.0 5.0 - 15.0 mmol/L    eGFR 80.8 >60.0 mL/min/1.73   D-dimer, Quantitative    Collection Time: 10/10/22 11:15 AM    Specimen: Blood   Result Value Ref Range    D-Dimer, Quantitative 0.62 (H) 0.00 - 0.49 MCGFEU/mL   CBC Auto Differential    Collection Time: 10/10/22 11:15 AM    Specimen: Blood   Result Value Ref Range    WBC 6.87 3.40 - 10.80 10*3/mm3    RBC 4.13 3.77 - 5.28 10*6/mm3    Hemoglobin 10.5 (L) 12.0 - 15.9 g/dL    Hematocrit 33.5 (L) 34.0 - 46.6 %    MCV 81.1 79.0 - 97.0 fL    MCH 25.4 (L) 26.6 - 33.0 pg    MCHC 31.3 (L) 31.5 - 35.7 g/dL    RDW 14.2 12.3 - 15.4 %    RDW-SD 41.1 37.0 - 54.0 fl    MPV 9.4 6.0 - 12.0 fL    Platelets 410 140 - 450 10*3/mm3    Neutrophil % 67.4 42.7 - 76.0 %    Lymphocyte % 16.2 (L) 19.6 - 45.3 %    Monocyte % 9.0 5.0 - 12.0 %    Eosinophil % 5.8 0.3 - 6.2 %    Basophil % 1.0 0.0 - 1.5 %    Immature Grans % 0.6 (H) 0.0 - 0.5 %    Neutrophils, Absolute  4.63 1.70 - 7.00 10*3/mm3    Lymphocytes, Absolute 1.11 0.70 - 3.10 10*3/mm3    Monocytes, Absolute 0.62 0.10 - 0.90 10*3/mm3    Eosinophils, Absolute 0.40 0.00 - 0.40 10*3/mm3    Basophils, Absolute 0.07 0.00 - 0.20 10*3/mm3    Immature Grans, Absolute 0.04 0.00 - 0.05 10*3/mm3    nRBC 0.0 0.0 - 0.2 /100 WBC       Ordered the above labs and independently reviewed the results.      RADIOLOGY  XR Knee 3 View Left, XR Tibia Fibula 2 View Left    Result Date: 10/10/2022  XR KNEE 3 VW LEFT-, XR TIBIA FIBULA 2 VW LEFT-clinical: Leg pain  FINDINGS: There is narrowing of the medial compartment of the left knee. There is narrowing of the patellofemoral articulation as well. No joint effusion, fracture or dislocation seen. No acute osseous abnormality of the left tibia/fibula. Surrounding soft tissues have a satisfactory appearance.  CONCLUSION: Left knee joint degeneration as described above, no acute osseous nor articular abnormality of the left knee nor the tibia/fibula.  This report was finalized on 10/10/2022 11:50 AM by Dr. Andreas Brooks M.D.        I ordered the above noted radiological studies. Reviewed by me and discussed with radiologist.  See dictation for official radiology interpretation.      PROCEDURES  Procedures      MEDICATIONS GIVEN IN ER    Medications - No data to display      PROGRESS, DATA ANALYSIS, CONSULTS, AND MEDICAL DECISION MAKING    All labs have been independently reviewed by me.  All radiology studies have been reviewed by me and discussed with radiologist dictating the report.   EKG's independently viewed and interpreted by me.  Discussion below represents my analysis of pertinent findings related to patient's condition, differential diagnosis, treatment plan and final disposition.      ED Course as of 10/10/22 1209   Mon Oct 10, 2022   [age] [id number]-old female with history of prior back surgery presents with left leg pain ongoing for several weeks.  The pain is keeping her up at night.  On exam she does seem to have some left knee effusion and likely left knee arthritis.  I do not see signs to suggest likely DVT but would consider is a low likelihood.  Also would consider electrolyte disturbance such as hypokalemia or hypocalcemia as patient is getting some cramping, particular at night.  Most likely I think that this is arthritic pain in the left knee which is worsened by her Lashon doing. [DB]   1200 D-Dimer, Quant(!): 0.62  D-dimer is normal when using age-related criteria and is felt to not represent pulmonary embolism in a patient at low pretest probability. [DB]   1200 CBC is unremarkable except for some mild anemia unlikely to be causing her leg pain.  No evidence to suggest infection [DB]   1201 Chemistries reviewed are benign without significant hypokalemia, hyper bow calcium Bernadette or other significant electrolyte disturbance.  Sodium of 135 unlikely to cause significant leg problems. [DB]   1201 Plain films of the left leg discussed with reading radiologist show that there are degenerative changes of the left knee.  No obvious fracture or acute bony deformity.    At this point I think patient symptoms are related to left knee arthritis and would treat with anti-inflammatory medication as well as rest.  She probably needs to avoid Lashon over the time pain.  We will give referral to orthopedic provider for further evaluation.  She may ultimately need injections, physical therapy MRI or surgery as possible treatment modalities. [DB]   1206 I discussed results of testing with patient at bedside.  Asked her to take some time off from Lashon.  We will use a short course of some NSAIDs and rest.  We will give orthopedic follow-up. [DB]      ED Course User Index  [DB] Lance Katz MD       AS OF 12:09 EDT VITALS:    BP - 143/95  HR - 76  TEMP - 97.8 °F (36.6 °C) (Tympanic)  O2 SATS - 97%      DIAGNOSIS  Final diagnoses:   Acute pain of left knee   Osteoarthritis of left knee, unspecified  Kimber Myers(Attending) osteoarthritis type         DISPOSITION  DISCHARGE    Patient discharged in stable condition.    Reviewed implications of results, diagnosis, meds, responsibility to follow up, warning signs and symptoms of possible worsening, potential complications and reasons to return to ER, including increased pain, fever or as needed.    Patient/Family voiced understanding of above instructions.    Discussed plan for discharge, as there is no emergent indication for admission. Patient referred to primary care provider for BP management due to today's BP. Pt/family is agreeable and understands need for follow up and repeat testing.  Pt is aware that discharge does not mean that nothing is wrong but it indicates no emergency is present that requires admission and they must continue care with follow-up as given below or physician of their choice.     FOLLOW-UP  Jocelyn Rico MD  [address]  [address]  [place] [id number] [phone]      Call for Appointment    Steve Montez MD  [address]  [place] [id number]  [phone]      Call for Appointment         Medication List      New Prescriptions    naproxen 500 MG tablet  Commonly known as: Naprosyn  Take 1 tablet by mouth 2 (Two) Times a Day With Meals.           Where to Get Your Medications      These medications were sent to [facility] [id number] - [place] [address] [address] [facility] & [address] [facility] [phone] [facility] [phone]   [address] [facility], [facility] [address]    Phone: [phone]   · naproxen 500 MG tablet                Gianna, Lance FUNES MD  10/10/22 3837

## 2022-10-14 ENCOUNTER — APPOINTMENT (OUTPATIENT)
Dept: MRI IMAGING | Facility: HOSPITAL | Age: 77
End: 2022-10-14

## 2022-10-31 ENCOUNTER — OFFICE VISIT (OUTPATIENT)
Dept: ORTHOPEDIC SURGERY | Facility: CLINIC | Age: 77
End: 2022-10-31

## 2022-10-31 VITALS — HEIGHT: 62 IN | WEIGHT: 124 LBS | TEMPERATURE: 98 F | BODY MASS INDEX: 22.82 KG/M2

## 2022-10-31 DIAGNOSIS — R52 PAIN: ICD-10-CM

## 2022-10-31 DIAGNOSIS — M17.12 PRIMARY OSTEOARTHRITIS OF LEFT KNEE: Primary | ICD-10-CM

## 2022-10-31 PROCEDURE — 99204 OFFICE O/P NEW MOD 45 MIN: CPT | Performed by: ORTHOPAEDIC SURGERY

## 2022-10-31 PROCEDURE — 20610 DRAIN/INJ JOINT/BURSA W/O US: CPT | Performed by: ORTHOPAEDIC SURGERY

## 2022-10-31 PROCEDURE — 73562 X-RAY EXAM OF KNEE 3: CPT | Performed by: ORTHOPAEDIC SURGERY

## 2022-10-31 PROCEDURE — 72170 X-RAY EXAM OF PELVIS: CPT | Performed by: ORTHOPAEDIC SURGERY

## 2022-10-31 RX ORDER — LIDOCAINE HYDROCHLORIDE 10 MG/ML
2 INJECTION, SOLUTION EPIDURAL; INFILTRATION; INTRACAUDAL; PERINEURAL
Status: COMPLETED | OUTPATIENT
Start: 2022-10-31 | End: 2022-10-31

## 2022-10-31 RX ORDER — METHYLPREDNISOLONE ACETATE 80 MG/ML
80 INJECTION, SUSPENSION INTRA-ARTICULAR; INTRALESIONAL; INTRAMUSCULAR; SOFT TISSUE
Status: COMPLETED | OUTPATIENT
Start: 2022-10-31 | End: 2022-10-31

## 2022-10-31 RX ADMIN — LIDOCAINE HYDROCHLORIDE 2 ML: 10 INJECTION, SOLUTION EPIDURAL; INFILTRATION; INTRACAUDAL; PERINEURAL at 13:53

## 2022-10-31 RX ADMIN — METHYLPREDNISOLONE ACETATE 80 MG: 80 INJECTION, SUSPENSION INTRA-ARTICULAR; INTRALESIONAL; INTRAMUSCULAR; SOFT TISSUE at 13:53

## 2022-10-31 NOTE — PROGRESS NOTES
Patient ID: Lou Shaffer     Chief Complaint:    Chief Complaint   Patient presents with   • Left Knee - Pain        HPI:    Lou Shaffer is a 77 y.o. who presents today for evaluation of left knee pain.  Patient states she has had knee issues for about 2 months.  She states she felt doing a workout and doing some squats got things irritated.  She still tries to stay active and does some Lashon.  Most the pain is anterior and medial.  Worse at night gets achy mainly medial side.  Also reports some stiffness.  Denies any numbness or tingling.  Patient states the right knee started to bother her a little as well.    Social History     Socioeconomic History   • Marital status:    Tobacco Use   • Smoking status: Never   • Smokeless tobacco: Never   Substance and Sexual Activity   • Alcohol use: Yes     Comment: MODERATE AMT   • Drug use: No   • Sexual activity: Yes     Partners: Male     Past Medical History:   Diagnosis Date   • Acne    • Adnexal mass 2002    RIGHT, S/P [facility] BSO   • Anxiety    • Breast cancer (HCC)    • Chronic headaches 01/04/2016    MRI OF BRAIN AT [facility] SHOWED PARTIAL OPACIFICATIONS OF THE FRONTAL ETHMOID AND MAXILLARY SINUSES AS WELL AS LEFT SPHENOID SINUS WITH FLUID AND MUCOSAL THICKENING. MINIMAL SMALL VESSEL DISEASE IN THE CEREBRAL WHITE MATTER. THE REMAINDER IS WNL, NO SIGNIFICANT CHANGE WHEN COMPARED TO PRIOR MRI ON 1/20/2009.   • Chronic low back pain    • Collagenous colitis 07/2019   • DDD (degenerative disc disease), cervical 2005   • Diarrhea 01/2019   • Disease of thyroid gland     HYPOTHYROIDISM   • Dizziness 06/11/2018    SEEN AT [facility] ER   • Dry skin    • Dysuria 11/2019   • Erosive gastritis with hemorrhage    • Fall 03/10/2014    CONTUSION OF COCCYX, SEEN AT [facility] ER   • GERD (gastroesophageal reflux disease)    • Glaucoma    • H/O hemorrhoids    • History of strabismus    • Hormone replacement therapy (HRT)    • Hot flashes    • Hyperlipidemia    • Hypertension    •  "Hypertensive emergency 01/11/2006    SEEN AT [facility] ER   • Insomnia    • Leukopenia 11/2019   • Night sweats 01/2018   • Positive fecal occult blood test 01/19/2018   • Post-menopausal    • PUD (peptic ulcer disease)    • Rectal bleeding 07/2019   • Scar of nose    • Sleep apnea    • Trigger thumb of both hands 04/2018   • Vitamin D deficiency      Family History   Problem Relation Age of Onset   • Pneumonia Mother    • COPD Father    • Coronary artery disease Sister    • Breast cancer Sister    • Cancer Sister    • Lung cancer Sister    • Cancer Sister    • Stroke Brother         CVA   • Heart disease Brother    • Heart attack Brother    • Ovarian cancer Neg Hx        ROS:    ROS:  Constitutional:  Denies fever, shaking or chills         All other pertinent positives and negatives as noted above in HPI.    Physical Exam:     Vital Signs:  Temp 98 °F (36.7 °C)   Ht 157.5 cm (62.01\")   Wt 56.2 kg (124 lb)   LMP  (LMP Unknown)   BMI 22.67 kg/m²   Constitutional: Awake alert and oriented x3, well developed, well nourished, no acute distress, non-toxic appearance.      Musculoskeletal:    Exam of the left  knee:  Painful gait with a subtle limp  No muscle atrophy, erythema, ecchymosis, or gross deformity noted  no knee effusion  No joint line tenderness  Active range of motion 0-125  5/5 strength flexion and extension  The knee is stable to varus and valgus stress testing  Mild varus alignment of the limb  Lachman negative  Posterior drawer negative  Ruby's negative  Patellofemoral grind +  Sensation grossly intact to light tough throughout the lower extremity  Skin is intact  Distal pulses are 2+  No signs or symptoms of DVT          Diagnostic Studies:     Imaging was personally and individually reviewed and discussed at length with the patient:    4V left knee(s) were taken in the office today, including AP, flexion PA, lateral, and sunrise views to evaluate the patient's complaint:  Weight bearing views show " moderate degenerative changes in all three compartments with the medial compartment being most affected.  There is early osteophyte formation throughout all three compartments.  There is no evidence of fracture or dislocation.  No periosteal reactions or medullary lesions are seen.  Patellar height and alignment are within normal limits.     Comparison films not available    AP pelvis was taken in the office today: Imaging shows some mild degenerative changes about hip joints some early bone sclerosis noted and possible early cystic change involving superior lateral acetabulum of the right hip.            Assessment:     left  Knee Osteoarthritis            Plan:     Based on x-rays, history, and office evaluation, we have diagnosed Lou Shaffer with knee arthritis. At this time, we recommend starting with a conservative treatment program. This will consist of cortisone injection during significant flare-ups, NSAIDS for daily maintenance, physical therapy for strengthening and modalities as indicated, and bracing when appropriate. These measures will continue, until symptoms are no longer relieved, become more severe or function begins to significantly deteriorate. At that point joint replacement options will be discussed.    Patient would like to try steroid injection today.  She like to hold on therapy but may call in the next few months if she wishes to do some.  She will also continue an anti-inflammatory.  Recommend she back off/modify some of her activity and build back if she feels better but again remember to modify.  Also gave her some lateral heel wedges for her shoes  Follow up in as needed unless symptoms return or a new issue occurs.  Patient will call the office to schedule an appointment.     All questions were answered, the patient understands and agrees with the plan.              Large Joint Arthrocentesis: L knee  Date/Time: 10/31/2022 1:53 PM  Consent given by: patient  Site marked: site  marked  Timeout: Immediately prior to procedure a time out was called to verify the correct patient, procedure, equipment, support staff and site/side marked as required   Supporting Documentation  Indications: pain and joint swelling   Procedure Details  Location: knee - L knee  Preparation: Patient was prepped and draped in the usual sterile fashion  Needle size: 22 G  Approach: anterolateral  Medications administered: 80 mg methylPREDNISolone acetate 80 MG/ML; 2 mL lidocaine PF 1% 1 %  Patient tolerance: patient tolerated the procedure well with no immediate complications

## 2022-11-03 DIAGNOSIS — F41.9 ANXIETY: ICD-10-CM

## 2022-11-03 RX ORDER — ALPRAZOLAM 0.5 MG/1
TABLET ORAL
Qty: 30 TABLET | Refills: 0 | Status: SHIPPED | OUTPATIENT
Start: 2022-11-03 | End: 2023-02-03 | Stop reason: SDUPTHER

## 2022-11-03 NOTE — TELEPHONE ENCOUNTER
Rx Refill Note  Requested Prescriptions     Pending Prescriptions Disp Refills   • ALPRAZolam (XANAX) 0.5 MG tablet [Pharmacy Med Name: ALPRAZolam 0.5 MG TABLET] 30 tablet      Sig: TAKE ONE TABLET BY MOUTH DAILY AS NEEDED FOR ANXIETY      Last office visit with prescribing clinician: 8/17/2022      Next office visit with prescribing clinician: 2/16/2023            Katelin Ledezma LPN  11/03/22, 12:58 EDT

## 2022-11-14 ENCOUNTER — APPOINTMENT (OUTPATIENT)
Dept: MRI IMAGING | Facility: HOSPITAL | Age: 77
End: 2022-11-14

## 2022-11-21 RX ORDER — BENAZEPRIL HYDROCHLORIDE 5 MG/1
TABLET, FILM COATED ORAL
Qty: 90 TABLET | Refills: 3 | Status: SHIPPED | OUTPATIENT
Start: 2022-11-21

## 2022-11-21 RX ORDER — PANTOPRAZOLE SODIUM 40 MG/1
TABLET, DELAYED RELEASE ORAL
Qty: 180 TABLET | Refills: 3 | Status: SHIPPED | OUTPATIENT
Start: 2022-11-21

## 2023-02-03 DIAGNOSIS — F41.9 ANXIETY: ICD-10-CM

## 2023-02-03 NOTE — TELEPHONE ENCOUNTER
Caller: Lou Shaffer    Relationship: Self    Best call back number: [phone]    Requested Prescriptions:   Requested Prescriptions     Pending Prescriptions Disp Refills   • ALPRAZolam (XANAX) 0.5 MG tablet 30 tablet 0     Sig: Take 1 tablet by mouth Daily As Needed for Anxiety.        Pharmacy where request should be sent: [facility] [id number] [facility] [id number] [facility] AT [facility] & [address] [facility] [phone] [facility] [phone] FX     Additional details provided by patient: PATIENT STATES THAT THIS PRESCRIPTION DOSE WAS UPPED TO 40MG    Does the patient have less than a 3 day supply:  [x] Yes  [] No    Would you like a call back once the refill request has been completed: [x] Yes [] No    If the office needs to give you a call back, can they leave a voicemail: [x] Yes [] No    Dayan Lomeli Rep   02/03/23 11:21 EST

## 2023-02-03 NOTE — TELEPHONE ENCOUNTER
Rx Refill Note  Requested Prescriptions     Pending Prescriptions Disp Refills   • ALPRAZolam (XANAX) 0.5 MG tablet 30 tablet 0     Sig: Take 1 tablet by mouth Daily As Needed for Anxiety.      Last office visit with prescribing clinician: 8/17/2022   Last telemedicine visit with prescribing clinician: 2/3/2023   Next office visit with prescribing clinician: 2/3/2023                         Would you like a call back once the refill request has been completed: [] Yes [] No    If the office needs to give you a call back, can they leave a voicemail: [] Yes [] No    Katelin Ledezma LPN  02/03/23, 11:53 EST

## 2023-02-05 RX ORDER — ALPRAZOLAM 0.5 MG/1
0.5 TABLET ORAL DAILY PRN
Qty: 30 TABLET | Refills: 0 | Status: SHIPPED | OUTPATIENT
Start: 2023-02-05 | End: 2023-02-16

## 2023-02-16 ENCOUNTER — OFFICE VISIT (OUTPATIENT)
Dept: FAMILY MEDICINE CLINIC | Facility: CLINIC | Age: 78
End: 2023-02-16
Payer: MEDICARE

## 2023-02-16 VITALS
RESPIRATION RATE: 17 BRPM | HEART RATE: 72 BPM | HEIGHT: 62 IN | TEMPERATURE: 98.4 F | SYSTOLIC BLOOD PRESSURE: 110 MMHG | BODY MASS INDEX: 23.37 KG/M2 | OXYGEN SATURATION: 96 % | WEIGHT: 127 LBS | DIASTOLIC BLOOD PRESSURE: 72 MMHG

## 2023-02-16 DIAGNOSIS — Z00.00 MEDICARE ANNUAL WELLNESS VISIT, SUBSEQUENT: Primary | ICD-10-CM

## 2023-02-16 DIAGNOSIS — F41.9 ANXIETY: ICD-10-CM

## 2023-02-16 DIAGNOSIS — E78.5 HYPERLIPIDEMIA, UNSPECIFIED HYPERLIPIDEMIA TYPE: ICD-10-CM

## 2023-02-16 DIAGNOSIS — E03.9 ADULT HYPOTHYROIDISM: ICD-10-CM

## 2023-02-16 PROCEDURE — 1170F FXNL STATUS ASSESSED: CPT | Performed by: FAMILY MEDICINE

## 2023-02-16 PROCEDURE — 1160F RVW MEDS BY RX/DR IN RCRD: CPT | Performed by: FAMILY MEDICINE

## 2023-02-16 PROCEDURE — G0439 PPPS, SUBSEQ VISIT: HCPCS | Performed by: FAMILY MEDICINE

## 2023-02-16 PROCEDURE — 96160 PT-FOCUSED HLTH RISK ASSMT: CPT | Performed by: FAMILY MEDICINE

## 2023-02-16 RX ORDER — ALPRAZOLAM 0.5 MG/1
0.5 TABLET ORAL DAILY PRN
Qty: 40 TABLET | Refills: 0
Start: 2023-02-16 | End: 2023-04-05

## 2023-02-16 NOTE — PROGRESS NOTES
The ABCs of the Annual Wellness Visit  Subsequent Medicare Wellness Visit    Subjective      Lou Shaffer is a 77 y.o. female who presents for a Subsequent Medicare Wellness Visit.    The following portions of the patient's history were reviewed and   updated as appropriate: allergies, current medications, past family history, past medical history, past social history, past surgical history and problem list.    Compared to one year ago, the patient feels her physical   health is the same.    Compared to one year ago, the patient feels her mental   health is the same.    Recent Hospitalizations:  She was not admitted to the hospital during the last year.       Current Medical Providers:  Patient Care Team:  Juan C Parish Sr., MD as PCP - General (Family Medicine)    Outpatient Medications Prior to Visit   Medication Sig Dispense Refill   • baclofen (LIORESAL) 10 MG tablet TAKE 1 TABLET THREE TIMES A DAY AS NEEDED FOR MUSCLE SPASMS 90 tablet 11   • benazepril (LOTENSIN) 5 MG tablet TAKE 1 TABLET EVERY NIGHT 90 tablet 3   • Budesonide (ENTOCORT EC) 3 MG 24 hr capsule Take 9 mg by mouth Daily.     • Cholecalciferol (VITAMIN D-3) 1000 UNITS capsule Take by mouth.     • desonide (DESOWEN) 0.05 % cream      • Diclofenac Sodium (VOLTAREN) 1 % gel gel APPLY 4 GRAMS TOPICALLY TO THE APPROPRIATE AREA AS DIRECTED FOUR TIMES A DAY AS NEEDED FOR KNEE PAIN 100 g 1   • hydrocortisone 2.5 % cream APPLY TOPICALLY TWICE A DAY TO AFFECTED AREA(S)     • levothyroxine (SYNTHROID, LEVOTHROID) 75 MCG tablet TAKE 1 TABLET DAILY 90 tablet 3   • magnesium oxide (MAG-OX) 400 MG tablet Take 400 mg by mouth 2 (two) times a day.     • meclizine (ANTIVERT) 12.5 MG tablet Take 1 tablet by mouth 3 (Three) Times a Day As Needed for Dizziness. 30 tablet 1   • naproxen (Naprosyn) 500 MG tablet Take 1 tablet by mouth 2 (Two) Times a Day With Meals. 20 tablet 0   • olopatadine (PATANOL) 0.1 % ophthalmic solution      • pantoprazole (PROTONIX) 40 MG  "EC tablet TAKE 1 TABLET TWICE A  tablet 3   • prednisoLONE acetate (PRED FORTE) 1 % ophthalmic suspension      • PreviDent 5000 Booster Plus 1.1 % paste      • ALPRAZolam (XANAX) 0.5 MG tablet Take 1 tablet by mouth Daily As Needed for Anxiety. 30 tablet 0   • fenofibrate 160 MG tablet TAKE 1 TABLET DAILY 90 tablet 3     No facility-administered medications prior to visit.       No opioid medication identified on active medication list. I have reviewed chart for other potential  high risk medication/s and harmful drug interactions in the elderly.          Aspirin is not on active medication list.  Aspirin use is not indicated based on review of current medical condition/s. Risk of harm outweighs potential benefits.  .    Patient Active Problem List   Diagnosis   • Anxiety   • Dry skin   • Hyperlipidemia   • Adult hypothyroidism   • Insomnia   • Vitamin D deficiency   • Hot flashes due to menopause   • Anal polyp   • Diarrhea   • Chronic bilateral low back pain with right-sided sciatica   • Chronic angle-closure glaucoma, bilateral, indeterminate stage   • Secondary corneal edema, bilateral     Advance Care Planning  Advance Directive is not on file.  ACP discussion was held with the patient during this visit. Patient has an advance directive (not in EMR), copy requested.     Objective    Vitals:    02/16/23 1233   BP: 110/72   BP Location: Right arm   Patient Position: Sitting   Cuff Size: Adult   Pulse: 72   Resp: 17   Temp: 98.4 °F (36.9 °C)   TempSrc: Infrared   SpO2: 96%   Weight: 57.6 kg (127 lb)   Height: 157.5 cm (62.01\")   PainSc: 0-No pain     Estimated body mass index is 23.22 kg/m² as calculated from the following:    Height as of this encounter: 157.5 cm (62.01\").    Weight as of this encounter: 57.6 kg (127 lb).    BMI is within normal parameters. No other follow-up for BMI required.    Physical Exam  Vitals reviewed.   Constitutional:       General: She is not in acute distress.     Appearance: " Normal appearance. She is normal weight. She is not ill-appearing or toxic-appearing.   HENT:      Head: Normocephalic and atraumatic.      Right Ear: Tympanic membrane, ear canal and external ear normal. There is no impacted cerumen.      Left Ear: Tympanic membrane, ear canal and external ear normal. There is no impacted cerumen.      Nose: Nose normal.      Mouth/Throat:      Mouth: Mucous membranes are moist.      Pharynx: Oropharynx is clear. No oropharyngeal exudate or posterior oropharyngeal erythema.   Eyes:      General: No scleral icterus.        Right eye: No discharge.         Left eye: No discharge.      Conjunctiva/sclera: Conjunctivae normal.   Neck:      Thyroid: No thyromegaly.      Vascular: No carotid bruit.   Cardiovascular:      Rate and Rhythm: Normal rate and regular rhythm.      Heart sounds: Normal heart sounds. No murmur heard.    No friction rub. No gallop.   Pulmonary:      Effort: Pulmonary effort is normal. No respiratory distress.      Breath sounds: Normal breath sounds. No wheezing or rales.   Chest:      Chest wall: No tenderness.   Abdominal:      General: Bowel sounds are normal. There is no distension.      Palpations: Abdomen is soft. There is no mass.      Tenderness: There is no abdominal tenderness. There is no guarding.   Musculoskeletal:         General: No deformity.      Cervical back: Neck supple.      Right lower leg: No edema.      Left lower leg: No edema.   Lymphadenopathy:      Cervical: No cervical adenopathy.   Skin:     Coloration: Skin is not jaundiced or pale.   Neurological:      General: No focal deficit present.      Mental Status: She is alert and oriented to person, place, and time.      Motor: No abnormal muscle tone.      Coordination: Coordination normal.   Psychiatric:         Mood and Affect: Mood normal.         Behavior: Behavior normal.           Does the patient have evidence of cognitive impairment?   No    Lab Results   Component Value Date     CHLPL 135 02/16/2023    TRIG 102 02/16/2023    HDL 71 (H) 02/16/2023    LDL 46 02/16/2023    VLDL 18 02/16/2023          HEALTH RISK ASSESSMENT    Smoking Status:  Social History     Tobacco Use   Smoking Status Never   Smokeless Tobacco Never     Alcohol Consumption:  Social History     Substance and Sexual Activity   Alcohol Use Yes    Comment: MODERATE AMT     Fall Risk Screen:    STEADI Fall Risk Assessment was completed, and patient is at LOW risk for falls.Assessment completed on:2/16/2023    Depression Screening:  PHQ-2/PHQ-9 Depression Screening 2/16/2023   Little Interest or Pleasure in Doing Things 0-->not at all   Feeling Down, Depressed or Hopeless 0-->not at all   PHQ-9: Brief Depression Severity Measure Score 0       Health Habits and Functional and Cognitive Screening:  Functional & Cognitive Status 2/16/2023   Do you have difficulty preparing food and eating? No   Do you have difficulty bathing yourself, getting dressed or grooming yourself? No   Do you have difficulty using the toilet? No   Do you have difficulty moving around from place to place? No   Do you have trouble with steps or getting out of a bed or a chair? No   Current Diet Low Carb Diet   Dental Exam Up to date   Eye Exam Up to date   Exercise (times per week) 7 times per week   Current Exercises Include Treadmill;Other   Do you need help using the phone?  No   Are you deaf or do you have serious difficulty hearing?  No   Do you need help with transportation? No   Do you need help shopping? No   Do you need help preparing meals?  No   Do you need help with housework?  No   Do you need help with laundry? No   Do you need help taking your medications? No   Do you need help managing money? No   Do you ever drive or ride in a car without wearing a seat belt? No   Have you felt unusual stress, anger or loneliness in the last month? No   Who do you live with? Spouse   If you need help, do you have trouble finding someone available to you? No    Have you been bothered in the last four weeks by sexual problems? No   Do you have difficulty concentrating, remembering or making decisions? No       Age-appropriate Screening Schedule:  Refer to the list below for future screening recommendations based on patient's age, sex and/or medical conditions. Orders for these recommended tests are listed in the plan section. The patient has been provided with a written plan.    Health Maintenance   Topic Date Due   • DXA SCAN  Never done   • ZOSTER VACCINE (1 of 2) Never done   • INFLUENZA VACCINE  08/01/2022   • ANNUAL WELLNESS VISIT  02/16/2024   • LIPID PANEL  02/16/2024   • COLORECTAL CANCER SCREENING  02/08/2029   • TDAP/TD VACCINES (2 - Td or Tdap) 12/28/2031   • HEPATITIS C SCREENING  Completed   • COVID-19 Vaccine  Completed   • Pneumococcal Vaccine 65+  Completed   • MAMMOGRAM  Discontinued                CMS Preventative Services Quick Reference  Risk Factors Identified During Encounter:    None Identified    The above risks/problems have been discussed with the patient.  Pertinent information has been shared with the patient in the After Visit Summary.    Diagnoses and all orders for this visit:    1. Medicare annual wellness visit, subsequent (Primary)    2. Anxiety  -     ALPRAZolam (XANAX) 0.5 MG tablet; Take 1 tablet by mouth Daily As Needed for Anxiety.  Dispense: 40 tablet; Refill: 0    3. Hyperlipidemia, unspecified hyperlipidemia type  -     CBC & Differential  -     Comprehensive Metabolic Panel  -     Lipid Panel    4. Adult hypothyroidism  -     TSH  -     T4        Follow Up:   Next Medicare Wellness visit to be scheduled in 1 year.      An After Visit Summary and PPPS were made available to the patient.      We did increase her tablet number at the last visit but that was not reflected at her most recent refill. I re-entered the number for her to receive 40 tablets of alprazolam every three months. She tried multiple alternatives in the SSRI  family and an SNRI with significant side effects and intolerances. She does well with this very low dose of alprazolam. She has significant AM anxiety that causes physical symptoms and she can powers those off when she starts to get anxious by low dose of alprazolam. We have discussed the risks associated with this medication long term and as she gets older. She has tolerated well and voices understanding of the risks. She is not having any bad effects and she has good response to this treatment.

## 2023-02-17 LAB
ALBUMIN SERPL-MCNC: 4.8 G/DL (ref 3.5–5.2)
ALBUMIN/GLOB SERPL: 2.4 G/DL
ALP SERPL-CCNC: 46 U/L (ref 39–117)
ALT SERPL-CCNC: 10 U/L (ref 1–33)
AST SERPL-CCNC: 13 U/L (ref 1–32)
BASOPHILS # BLD AUTO: 0.06 10*3/MM3 (ref 0–0.2)
BASOPHILS NFR BLD AUTO: 0.8 % (ref 0–1.5)
BILIRUB SERPL-MCNC: 0.2 MG/DL (ref 0–1.2)
BUN SERPL-MCNC: 19 MG/DL (ref 8–23)
BUN/CREAT SERPL: 25.7 (ref 7–25)
CALCIUM SERPL-MCNC: 9.9 MG/DL (ref 8.6–10.5)
CHLORIDE SERPL-SCNC: 104 MMOL/L (ref 98–107)
CHOLEST SERPL-MCNC: 135 MG/DL (ref 0–200)
CO2 SERPL-SCNC: 28.3 MMOL/L (ref 22–29)
CREAT SERPL-MCNC: 0.74 MG/DL (ref 0.57–1)
EGFRCR SERPLBLD CKD-EPI 2021: 82.9 ML/MIN/1.73
EOSINOPHIL # BLD AUTO: 0.38 10*3/MM3 (ref 0–0.4)
EOSINOPHIL NFR BLD AUTO: 5.2 % (ref 0.3–6.2)
ERYTHROCYTE [DISTWIDTH] IN BLOOD BY AUTOMATED COUNT: 14.6 % (ref 12.3–15.4)
GLOBULIN SER CALC-MCNC: 2 GM/DL
GLUCOSE SERPL-MCNC: 92 MG/DL (ref 65–99)
HCT VFR BLD AUTO: 29.8 % (ref 34–46.6)
HDLC SERPL-MCNC: 71 MG/DL (ref 40–60)
HGB BLD-MCNC: 9.3 G/DL (ref 12–15.9)
IMM GRANULOCYTES # BLD AUTO: 0.04 10*3/MM3 (ref 0–0.05)
IMM GRANULOCYTES NFR BLD AUTO: 0.5 % (ref 0–0.5)
LDLC SERPL CALC-MCNC: 46 MG/DL (ref 0–100)
LYMPHOCYTES # BLD AUTO: 1.06 10*3/MM3 (ref 0.7–3.1)
LYMPHOCYTES NFR BLD AUTO: 14.6 % (ref 19.6–45.3)
MCH RBC QN AUTO: 24.4 PG (ref 26.6–33)
MCHC RBC AUTO-ENTMCNC: 31.2 G/DL (ref 31.5–35.7)
MCV RBC AUTO: 78.2 FL (ref 79–97)
MONOCYTES # BLD AUTO: 0.55 10*3/MM3 (ref 0.1–0.9)
MONOCYTES NFR BLD AUTO: 7.6 % (ref 5–12)
NEUTROPHILS # BLD AUTO: 5.19 10*3/MM3 (ref 1.7–7)
NEUTROPHILS NFR BLD AUTO: 71.3 % (ref 42.7–76)
NRBC BLD AUTO-RTO: 0 /100 WBC (ref 0–0.2)
PLATELET # BLD AUTO: 311 10*3/MM3 (ref 140–450)
POTASSIUM SERPL-SCNC: 4.5 MMOL/L (ref 3.5–5.2)
PROT SERPL-MCNC: 6.8 G/DL (ref 6–8.5)
RBC # BLD AUTO: 3.81 10*6/MM3 (ref 3.77–5.28)
SODIUM SERPL-SCNC: 140 MMOL/L (ref 136–145)
T4 SERPL-MCNC: 9.92 MCG/DL (ref 4.5–11.7)
TRIGL SERPL-MCNC: 102 MG/DL (ref 0–150)
TSH SERPL DL<=0.005 MIU/L-ACNC: 0.34 UIU/ML (ref 0.27–4.2)
VLDLC SERPL CALC-MCNC: 18 MG/DL (ref 5–40)
WBC # BLD AUTO: 7.28 10*3/MM3 (ref 3.4–10.8)

## 2023-02-17 RX ORDER — FENOFIBRATE 160 MG/1
TABLET ORAL
Qty: 90 TABLET | Refills: 3 | Status: SHIPPED | OUTPATIENT
Start: 2023-02-17

## 2023-04-05 DIAGNOSIS — F41.9 ANXIETY: ICD-10-CM

## 2023-04-05 NOTE — TELEPHONE ENCOUNTER
Rx Refill Note  Requested Prescriptions     Pending Prescriptions Disp Refills   • ALPRAZolam (XANAX) 0.5 MG tablet [Pharmacy Med Name: ALPRAZOLAM 0.5 MG TABLET] 30 tablet 0     Sig: TAKE ONE TABLET BY MOUTH DAILY AS NEEDED FOR ANXIETY      Last office visit with prescribing clinician: 2/16/2023   Last telemedicine visit with prescribing clinician: 8/16/2023   Next office visit with prescribing clinician: Visit date not found                         Would you like a call back once the refill request has been completed: [] Yes [] No    If the office needs to give you a call back, can they leave a voicemail: [] Yes [] No    Ramsey Martinez MA  04/05/23, 11:40 EDT     Updated contract

## 2023-04-06 RX ORDER — ALPRAZOLAM 0.5 MG/1
TABLET ORAL
Qty: 30 TABLET | Refills: 0 | Status: SHIPPED | OUTPATIENT
Start: 2023-04-06

## 2023-04-19 DIAGNOSIS — D50.9 MICROCYTIC ANEMIA: Primary | ICD-10-CM

## 2023-04-21 ENCOUNTER — TELEPHONE (OUTPATIENT)
Dept: FAMILY MEDICINE CLINIC | Facility: CLINIC | Age: 78
End: 2023-04-21

## 2023-04-21 NOTE — TELEPHONE ENCOUNTER
Caller: Lou Shaffer    Relationship: Self    Best call back number: [phone]    What was the call regarding: PATIENT WAS RETURNING A MISSED CALL THAT STATED DR BERNARDO WANTED TO SEE HER. PLEASE ADVISE.     Do you require a callback: YES, ATTEMPTED WARM TRANSFER, NO ANSWER.

## 2023-04-21 NOTE — TELEPHONE ENCOUNTER
No previous messages showing in chart for scheduling pt. Delgado pt need to be scheduled with Dr. Rico or another provider in the office?    Please advise

## 2023-05-02 LAB
ERYTHROCYTE [DISTWIDTH] IN BLOOD BY AUTOMATED COUNT: 15.5 % (ref 12.3–15.4)
FERRITIN SERPL-MCNC: 5.76 NG/ML (ref 13–150)
HCT VFR BLD AUTO: 29.8 % (ref 34–46.6)
HGB BLD-MCNC: 8.8 G/DL (ref 12–15.9)
IRON SATN MFR SERPL: 3 % (ref 20–50)
IRON SERPL-MCNC: 26 MCG/DL (ref 37–145)
MCH RBC QN AUTO: 23.3 PG (ref 26.6–33)
MCHC RBC AUTO-ENTMCNC: 29.5 G/DL (ref 31.5–35.7)
MCV RBC AUTO: 78.8 FL (ref 79–97)
PLATELET # BLD AUTO: 371 10*3/MM3 (ref 140–450)
RBC # BLD AUTO: 3.78 10*6/MM3 (ref 3.77–5.28)
TIBC SERPL-MCNC: 752 MCG/DL
UIBC SERPL-MCNC: 726 MCG/DL (ref 112–346)
WBC # BLD AUTO: 7.28 10*3/MM3 (ref 3.4–10.8)

## 2023-05-08 DIAGNOSIS — R71.0 DROP IN HEMOGLOBIN: ICD-10-CM

## 2023-05-08 DIAGNOSIS — K52.831 COLLAGENOUS COLITIS: Primary | ICD-10-CM

## 2023-05-08 DIAGNOSIS — R53.83 OTHER FATIGUE: ICD-10-CM

## 2023-05-08 DIAGNOSIS — D50.9 IRON DEFICIENCY ANEMIA, UNSPECIFIED IRON DEFICIENCY ANEMIA TYPE: ICD-10-CM

## 2023-05-15 NOTE — TELEPHONE ENCOUNTER
Rx Refill Note  Requested Prescriptions     Pending Prescriptions Disp Refills   • Diclofenac Sodium (VOLTAREN) 1 % gel gel [Pharmacy Med Name: DICLOFENAC SODIUM 1% GEL] 100 g 1     Sig: APPLY 4 GRAMS TOPICALLY TO THE AFFECTED AREAS AS DIRECTED FOUR TIMES A DAY AS NEEDED FOR KNEE PAIN      Last office visit with prescribing clinician: 2/16/2023   Last telemedicine visit with prescribing clinician: 4/21/2023   Next office visit with prescribing clinician: Visit date not found                         Would you like a call back once the refill request has been completed: [] Yes [] No    If the office needs to give you a call back, can they leave a voicemail: [] Yes [] No    Katelin Ledezma LPN  05/15/23, 13:10 EDT

## 2023-05-18 ENCOUNTER — LAB (OUTPATIENT)
Dept: LAB | Facility: HOSPITAL | Age: 78
End: 2023-05-18
Payer: MEDICARE

## 2023-05-18 ENCOUNTER — PREP FOR SURGERY (OUTPATIENT)
Dept: SURGERY | Facility: SURGERY CENTER | Age: 78
End: 2023-05-18
Payer: MEDICARE

## 2023-05-18 ENCOUNTER — OFFICE VISIT (OUTPATIENT)
Dept: GASTROENTEROLOGY | Facility: CLINIC | Age: 78
End: 2023-05-18
Payer: MEDICARE

## 2023-05-18 VITALS
OXYGEN SATURATION: 98 % | WEIGHT: 126.4 LBS | SYSTOLIC BLOOD PRESSURE: 106 MMHG | HEIGHT: 62 IN | HEART RATE: 72 BPM | DIASTOLIC BLOOD PRESSURE: 78 MMHG | TEMPERATURE: 98 F | BODY MASS INDEX: 23.26 KG/M2

## 2023-05-18 DIAGNOSIS — R10.33 PERIUMBILICAL ABDOMINAL PAIN: Chronic | ICD-10-CM

## 2023-05-18 DIAGNOSIS — R19.7 DIARRHEA, UNSPECIFIED TYPE: Chronic | ICD-10-CM

## 2023-05-18 DIAGNOSIS — K52.831 COLLAGENOUS COLITIS: ICD-10-CM

## 2023-05-18 DIAGNOSIS — R15.9 INCONTINENCE OF FECES, UNSPECIFIED FECAL INCONTINENCE TYPE: ICD-10-CM

## 2023-05-18 DIAGNOSIS — R19.7 DIARRHEA, UNSPECIFIED TYPE: ICD-10-CM

## 2023-05-18 DIAGNOSIS — R10.33 PERIUMBILICAL ABDOMINAL PAIN: ICD-10-CM

## 2023-05-18 DIAGNOSIS — K52.831 COLLAGENOUS COLITIS: Primary | Chronic | ICD-10-CM

## 2023-05-18 DIAGNOSIS — D50.9 IRON DEFICIENCY ANEMIA, UNSPECIFIED IRON DEFICIENCY ANEMIA TYPE: ICD-10-CM

## 2023-05-18 DIAGNOSIS — K21.9 GASTROESOPHAGEAL REFLUX DISEASE, UNSPECIFIED WHETHER ESOPHAGITIS PRESENT: Chronic | ICD-10-CM

## 2023-05-18 DIAGNOSIS — D50.9 IRON DEFICIENCY ANEMIA, UNSPECIFIED IRON DEFICIENCY ANEMIA TYPE: Primary | ICD-10-CM

## 2023-05-18 DIAGNOSIS — R15.9 INCONTINENCE OF FECES, UNSPECIFIED FECAL INCONTINENCE TYPE: Chronic | ICD-10-CM

## 2023-05-18 DIAGNOSIS — K21.9 GASTROESOPHAGEAL REFLUX DISEASE, UNSPECIFIED WHETHER ESOPHAGITIS PRESENT: ICD-10-CM

## 2023-05-18 LAB
BASOPHILS # BLD AUTO: 0.06 10*3/MM3 (ref 0–0.2)
BASOPHILS NFR BLD AUTO: 0.9 % (ref 0–1.5)
DEPRECATED RDW RBC AUTO: 55.4 FL (ref 37–54)
EOSINOPHIL # BLD AUTO: 0.5 10*3/MM3 (ref 0–0.4)
EOSINOPHIL NFR BLD AUTO: 7.1 % (ref 0.3–6.2)
ERYTHROCYTE [DISTWIDTH] IN BLOOD BY AUTOMATED COUNT: 21 % (ref 12.3–15.4)
HCT VFR BLD AUTO: 31.6 % (ref 34–46.6)
HGB BLD-MCNC: 9.5 G/DL (ref 12–15.9)
IMM GRANULOCYTES # BLD AUTO: 0.08 10*3/MM3 (ref 0–0.05)
IMM GRANULOCYTES NFR BLD AUTO: 1.1 % (ref 0–0.5)
LYMPHOCYTES # BLD AUTO: 1.23 10*3/MM3 (ref 0.7–3.1)
LYMPHOCYTES NFR BLD AUTO: 17.5 % (ref 19.6–45.3)
MCH RBC QN AUTO: 24.4 PG (ref 26.6–33)
MCHC RBC AUTO-ENTMCNC: 30.1 G/DL (ref 31.5–35.7)
MCV RBC AUTO: 81 FL (ref 79–97)
MONOCYTES # BLD AUTO: 0.51 10*3/MM3 (ref 0.1–0.9)
MONOCYTES NFR BLD AUTO: 7.3 % (ref 5–12)
NEUTROPHILS NFR BLD AUTO: 4.63 10*3/MM3 (ref 1.7–7)
NEUTROPHILS NFR BLD AUTO: 66.1 % (ref 42.7–76)
NRBC BLD AUTO-RTO: 0 /100 WBC (ref 0–0.2)
PLATELET # BLD AUTO: 333 10*3/MM3 (ref 140–450)
PMV BLD AUTO: 8.8 FL (ref 6–12)
RBC # BLD AUTO: 3.9 10*6/MM3 (ref 3.77–5.28)
WBC NRBC COR # BLD: 7.01 10*3/MM3 (ref 3.4–10.8)

## 2023-05-18 PROCEDURE — 36415 COLL VENOUS BLD VENIPUNCTURE: CPT | Performed by: NURSE PRACTITIONER

## 2023-05-18 PROCEDURE — 1159F MED LIST DOCD IN RCRD: CPT | Performed by: NURSE PRACTITIONER

## 2023-05-18 PROCEDURE — 85025 COMPLETE CBC W/AUTO DIFF WBC: CPT | Performed by: NURSE PRACTITIONER

## 2023-05-18 PROCEDURE — 1160F RVW MEDS BY RX/DR IN RCRD: CPT | Performed by: NURSE PRACTITIONER

## 2023-05-18 PROCEDURE — 99204 OFFICE O/P NEW MOD 45 MIN: CPT | Performed by: NURSE PRACTITIONER

## 2023-05-18 RX ORDER — SODIUM CHLORIDE 0.9 % (FLUSH) 0.9 %
3 SYRINGE (ML) INJECTION EVERY 12 HOURS SCHEDULED
OUTPATIENT
Start: 2023-05-18

## 2023-05-18 RX ORDER — SODIUM CHLORIDE, SODIUM LACTATE, POTASSIUM CHLORIDE, CALCIUM CHLORIDE 600; 310; 30; 20 MG/100ML; MG/100ML; MG/100ML; MG/100ML
30 INJECTION, SOLUTION INTRAVENOUS CONTINUOUS PRN
OUTPATIENT
Start: 2023-05-18

## 2023-05-18 RX ORDER — SODIUM CHLORIDE 0.9 % (FLUSH) 0.9 %
10 SYRINGE (ML) INJECTION AS NEEDED
OUTPATIENT
Start: 2023-05-18

## 2023-05-18 NOTE — PATIENT INSTRUCTIONS
For further evaluation of iron deficiency anemia we will proceed with EGD and colonoscopy to see if there is any GI blood loss.     2.   We recommend that you restart your oral iron therapy as recommended by your PCP.    3.  We will order fecal occult blood testing x 3 to assess your stool for blood.     4.  Continue pantoprazole 40 mg once daily for GERD.     5.  Plan for office follow up 2-4 weeks after procedures to discuss results and reassess symptoms.     6.  She may continue budesonide 3 mg once daily as needed based upon symptoms.

## 2023-05-18 NOTE — PROGRESS NOTES
"Chief Complaint   Patient presents with   • Anemia     Collagenous colitis, abdominal pain, GERD         History of Present Illness  77-year-old female presents the office today for evaluation of iron deficiency anemia and collagenous colitis.  She was a former patient of Dr. Velez was last evaluated in his office on 4/8/2021.  Last colonoscopy was performed on 2/8/2019 with pathology revealing findings consistent with collagenous colitis.  Colonoscopy was unremarkable otherwise.  She has previously taken budesonide.    She denies any personal history of colon polyps and denies any family history of colon cancer.    She reports that her bowel movements always consist of diarrhea/loose stool. She is still taking Budesonide 3 mg once daily as needed. She reports having a BM about 2-3 each morning. She denies any nocturnal stools. She reports abdominal pain, centralized near the umbilical area. She reports that if she is having abdominal pain she can take a budesonide and it \"clears it up\". She has been following this regimen since 2019. She reports taking a budesonide on average 2 tabs per week. She reports having stomach problems all of her life. She reports a wetness at her rectum that \"comes from inside\". She does not feel that the wetness is not stool. She does not feel that it is sweat. This symptom occurs daily and she wears a panty liner daily. She reports having a bidet that she uses to clean herself. She does not take a fiber supplement.     She reports having an external polyp that has previously been evaluated by Dr. Agee. Patient has opted to not undergo surgery for removal.     Most recent lab work demonstrated a low ferritin level of 5.76, a serum iron level of 26, an H/H of 8.8/29.8% on 5/1/2023. She reports having some anemia as a child. She has taken biotin for her hair and nails. She denies any melena or bright red blood per rectum. She has followed with her PCP for anemia thus far and does not " "have a hematologist.     She has history of GERD and takes pantoprazole 40 mg once daily. She reports a history of a bleeding gastric ulcer. She takes a teaspoon of olive oil daily. She denies any nausea, vomiting, or dysphagia. She denies having any weight loss. She has not had her stool checked for blood since 2015 per her report. She reports a history of hemorrhoids. She is not taking any NSAIDS.     Review of Systems   Constitutional: Positive for fatigue. Negative for fever and unexpected weight change.   HENT: Negative for trouble swallowing.    Cardiovascular: Negative for chest pain.   Gastrointestinal: Positive for abdominal pain and diarrhea. Negative for abdominal distention, anal bleeding, blood in stool, constipation, nausea, rectal pain and vomiting.      Result Review :       SCANNED - COLONOSCOPY (02/08/2019)  CBC (No Diff) (05/01/2023 13:10)  Iron Profile (05/01/2023 13:10)  Ferritin (05/01/2023 13:10)    Vital Signs:   /78   Pulse 72   Temp 98 °F (36.7 °C)   Ht 157.5 cm (62\")   Wt 57.3 kg (126 lb 6.4 oz)   SpO2 98%   BMI 23.12 kg/m²     Body mass index is 23.12 kg/m².     Physical Exam  Vitals reviewed.   Constitutional:       Appearance: Normal appearance.   HENT:      Head: Normocephalic.      Nose: Nose normal.      Mouth/Throat:      Mouth: Mucous membranes are moist.   Eyes:      General: No scleral icterus.     Extraocular Movements: Extraocular movements intact.   Cardiovascular:      Rate and Rhythm: Normal rate and regular rhythm.      Pulses: Normal pulses.      Heart sounds: Normal heart sounds.   Pulmonary:      Effort: Pulmonary effort is normal. No respiratory distress.      Breath sounds: Normal breath sounds.   Abdominal:      General: Abdomen is flat. Bowel sounds are normal. There is no distension.      Palpations: Abdomen is soft. There is no mass.      Tenderness: There is no abdominal tenderness. There is no guarding.   Musculoskeletal:         General: Normal range " of motion.      Cervical back: Normal range of motion and neck supple.   Skin:     General: Skin is warm and dry.      Coloration: Skin is pale. Skin is not jaundiced.   Neurological:      General: No focal deficit present.      Mental Status: She is alert.   Psychiatric:         Mood and Affect: Mood normal.         Thought Content: Thought content normal.         Judgment: Judgment normal.       Assessment and Plan    Diagnoses and all orders for this visit:    1. Collagenous colitis (Primary)    2. Iron deficiency anemia, unspecified iron deficiency anemia type  -     Cancel: Occult Blood X 3, Stool - Stool, Per Rectum; Future  -     CBC & Differential  -     Cancel: Occult Blood X 3, Stool - Stool, Per Rectum  -     Occult Blood X 3, Stool - Stool, Per Rectum; Future    3. Diarrhea, unspecified type    4. Gastroesophageal reflux disease, unspecified whether esophagitis present    5. Periumbilical abdominal pain    6. Incontinence of feces, unspecified fecal incontinence type           Patient Instructions   1. For further evaluation of iron deficiency anemia we will proceed with EGD and colonoscopy to see if there is any GI blood loss.     2.   We recommend that you restart your oral iron therapy as recommended by your PCP.    3.  We will order fecal occult blood testing x 3 to assess your stool for blood.     4.  Continue pantoprazole 40 mg once daily for GERD.     5.  Plan for office follow up 2-4 weeks after procedures to discuss results and reassess symptoms.     6.  She may continue budesonide 3 mg once daily as needed based upon symptoms.     Discussion:    We will proceed with EGD and colonoscopy for further evaluation of anemia.  We have recommended that the patient restart her oral iron therapy as prescribed by her primary care physician as her hemoglobin was 8.8 when last checked.  We will recheck a CBC today and will also check fecal occult blood testing x3.  If EGD and colonoscopy are negative and  FOBT is negative, we would plan to refer to hematology for further evaluation.    Patient has been on budesonide intermittently since 2019 for management of collagenous colitis.  Ideally I would like for us to find an alternative treatment aside from budesonide.  However, we will await findings from her colonoscopy before making any changes to this therapy.  Could consider use of Imodium, Pepto-Bismol, or colestipol.    Patient to continue pantoprazole 40 mg once daily for GERD.  We will plan for office follow-up 2 to 4 weeks after procedures to discuss results, reassess symptoms, and adjust plan of care accordingly if needed.  Patient verbalized understand above plan of care and is in agreement.  All questions answered and support provided.    EMR Dragon/Transcription Disclaimer:  This document has been Dictated utilizing Dragon dictation.

## 2023-05-24 ENCOUNTER — TELEPHONE (OUTPATIENT)
Dept: ORTHOPEDIC SURGERY | Facility: CLINIC | Age: 78
End: 2023-05-24

## 2023-05-24 NOTE — TELEPHONE ENCOUNTER
Caller: TAISHA DANG    Relationship to patient: SELF    Best call back number: [phone]    Chief complaint: INJECTION LEFT KNEE    Type of visit: INJECTION LEFT KNEE    Requested date: EARLY AFTERNOON    If rescheduling, when is the original appointment: NA    Additional notes: NA

## 2023-05-26 ENCOUNTER — LAB (OUTPATIENT)
Dept: LAB | Facility: HOSPITAL | Age: 78
End: 2023-05-26
Payer: MEDICARE

## 2023-05-26 DIAGNOSIS — D50.9 IRON DEFICIENCY ANEMIA, UNSPECIFIED IRON DEFICIENCY ANEMIA TYPE: Chronic | ICD-10-CM

## 2023-05-26 LAB
COLLECT DATE SP2 STL: NORMAL
COLLECT DATE SP3 STL: NORMAL
COLLECT DATE STL: NORMAL
GASTROCULT GAST QL: NEGATIVE
HEMOCCULT SP2 STL QL: NEGATIVE
HEMOCCULT SP3 STL QL: NEGATIVE
Lab: NORMAL

## 2023-05-26 PROCEDURE — 82272 OCCULT BLD FECES 1-3 TESTS: CPT

## 2023-05-30 ENCOUNTER — CLINICAL SUPPORT (OUTPATIENT)
Dept: ORTHOPEDIC SURGERY | Facility: CLINIC | Age: 78
End: 2023-05-30

## 2023-05-30 VITALS — HEIGHT: 62 IN | WEIGHT: 125.3 LBS | BODY MASS INDEX: 23.06 KG/M2 | TEMPERATURE: 98 F

## 2023-05-30 DIAGNOSIS — M17.12 PRIMARY OSTEOARTHRITIS OF LEFT KNEE: Primary | ICD-10-CM

## 2023-05-30 RX ORDER — LIDOCAINE HYDROCHLORIDE 10 MG/ML
2 INJECTION, SOLUTION EPIDURAL; INFILTRATION; INTRACAUDAL; PERINEURAL
Status: COMPLETED | OUTPATIENT
Start: 2023-05-30 | End: 2023-05-30

## 2023-05-30 RX ORDER — METHYLPREDNISOLONE ACETATE 80 MG/ML
80 INJECTION, SUSPENSION INTRA-ARTICULAR; INTRALESIONAL; INTRAMUSCULAR; SOFT TISSUE
Status: COMPLETED | OUTPATIENT
Start: 2023-05-30 | End: 2023-05-30

## 2023-05-30 RX ADMIN — METHYLPREDNISOLONE ACETATE 80 MG: 80 INJECTION, SUSPENSION INTRA-ARTICULAR; INTRALESIONAL; INTRAMUSCULAR; SOFT TISSUE at 13:04

## 2023-05-30 RX ADMIN — LIDOCAINE HYDROCHLORIDE 2 ML: 10 INJECTION, SOLUTION EPIDURAL; INFILTRATION; INTRACAUDAL; PERINEURAL at 13:04

## 2023-05-30 NOTE — PROGRESS NOTES
"Knee Joint Injection      Patient: Lou Shaffer        YOB: 1945            Chief Complaints: Knee pain      History of Present Illness:  Pt gets intermittent  injections with good relief. Is here for left knee injection.  Understands options.      Physical Exam: 77 y.o. female  General Appearance:    Alert, cooperative, in no acute distress                   Vitals:    05/30/23 1249   Temp: 98 °F (36.7 °C)   TempSrc: Temporal   Weight: 56.8 kg (125 lb 4.8 oz)   Height: 157.5 cm (62\")   PainSc: 10-Worst pain ever      Patient is alert and read ×3 no acute distress appears her above-listed at height weight and age.  Affect is normal respiratory rate is normal unlabored. Heart rate regular rate rhythm, sclera, dentition and hearing are normal for the purpose of this exam.  Exam and complaints are unchanged.      Procedure:  Left knee steroid injection.          Assessment. Persistent knee pain      Plan: Is to proceed with injection    She tolerated injection well.          Large Joint Arthrocentesis: L knee  Date/Time: 5/30/2023 1:04 PM  Consent given by: patient  Site marked: site marked  Timeout: Immediately prior to procedure a time out was called to verify the correct patient, procedure, equipment, support staff and site/side marked as required   Supporting Documentation  Indications: pain and joint swelling   Procedure Details  Location: knee - L knee  Preparation: Patient was prepped and draped in the usual sterile fashion  Needle size: 25 G  Approach: anterolateral  Medications administered: 80 mg methylPREDNISolone acetate 80 MG/ML; 2 mL lidocaine PF 1% 1 %  Patient tolerance: patient tolerated the procedure well with no immediate complications          "

## 2023-06-08 ENCOUNTER — TELEPHONE (OUTPATIENT)
Dept: FAMILY MEDICINE CLINIC | Facility: CLINIC | Age: 78
End: 2023-06-08
Payer: MEDICARE

## 2023-06-08 NOTE — TELEPHONE ENCOUNTER
Lou Shaffer called in requesting an order for an MRI of her back and legs. She stated she getting a spinal shot and needs to see how disc are doing in her back. And her legs have  shooting pain. Pt has an appt to transition to Dr. Parish on 8/16/23.

## 2023-06-15 ENCOUNTER — OFFICE VISIT (OUTPATIENT)
Dept: FAMILY MEDICINE CLINIC | Facility: CLINIC | Age: 78
End: 2023-06-15
Payer: MEDICARE

## 2023-06-15 VITALS
WEIGHT: 122 LBS | DIASTOLIC BLOOD PRESSURE: 80 MMHG | BODY MASS INDEX: 22.45 KG/M2 | RESPIRATION RATE: 18 BRPM | SYSTOLIC BLOOD PRESSURE: 110 MMHG | HEIGHT: 62 IN | TEMPERATURE: 96.2 F | OXYGEN SATURATION: 100 % | HEART RATE: 86 BPM

## 2023-06-15 DIAGNOSIS — M54.41 CHRONIC BILATERAL LOW BACK PAIN WITH RIGHT-SIDED SCIATICA: Primary | ICD-10-CM

## 2023-06-15 DIAGNOSIS — G89.29 CHRONIC BILATERAL LOW BACK PAIN WITH RIGHT-SIDED SCIATICA: Primary | ICD-10-CM

## 2023-06-15 DIAGNOSIS — F41.9 ANXIETY: ICD-10-CM

## 2023-06-15 PROCEDURE — 99214 OFFICE O/P EST MOD 30 MIN: CPT | Performed by: FAMILY MEDICINE

## 2023-06-15 NOTE — PROGRESS NOTES
"Chief Complaint  Chief Complaint   Patient presents with    [facility] Care     New Pt     Imaging Only     Pt needing MRI        Subjective    History of Present Illness        Lou Shaffer presents to [facility] for   Back Pain  This is a chronic problem. The current episode started more than 1 year ago. The problem occurs constantly. The problem has been waxing and waning since onset. The pain is present in the lumbar spine. The pain is moderate. The symptoms are aggravated by bending and sitting. Stiffness is present All day. Associated symptoms include leg pain and weakness. Pertinent negatives include no headaches.   Anxiety  Presents for follow-up visit. Symptoms include nervous/anxious behavior and panic. Patient reports no compulsions, depressed mood, dizziness, dry mouth, feeling of choking, insomnia, palpitations, restlessness, shortness of breath or suicidal ideas. Symptoms occur constantly. The quality of sleep is good. Nighttime awakenings: none.     Compliance with medications is %.      Objective   Vital Signs:   Visit Vitals  /80   Pulse 86   Temp 96.2 °F (35.7 °C)   Resp 18   Ht 157.5 cm (62\")   Wt 55.3 kg (122 lb)   LMP  (LMP Unknown)   SpO2 100%   BMI 22.31 kg/m²       BMI is within normal parameters. No other follow-up for BMI required.     Physical Exam  Vitals reviewed.   Constitutional:       Appearance: She is well-developed.   HENT:      Head: Normocephalic and atraumatic.   Eyes:      Conjunctiva/sclera: Conjunctivae normal.   Cardiovascular:      Rate and Rhythm: Normal rate and regular rhythm.      Heart sounds: Normal heart sounds.   Pulmonary:      Effort: Pulmonary effort is normal.      Breath sounds: Normal breath sounds.   Abdominal:      General: Bowel sounds are normal.      Palpations: Abdomen is soft.   Musculoskeletal:      Cervical back: Normal range of motion.      Lumbar back: Spasms, tenderness and bony tenderness present. " Decreased range of motion.   Skin:     General: Skin is warm and dry.   Neurological:      Mental Status: She is alert and oriented to person, place, and time.          Result Review :                    Assessment and Plan      Diagnoses and all orders for this visit:    1. Chronic bilateral low back pain with right-sided sciatica (Primary)  Assessment & Plan:  Symptoms are gradually worsening.  An MRI was ordered for her low back pain.    Orders:  -     MRI Lumbar Spine With & Without Contrast; Future    2. Anxiety  Assessment & Plan:  Patient symptoms are stable.  Patient is prescribed Xanax about every 3 months to help control her symptoms.  Patient was given a refill on 6/16/2023.               Follow Up   No follow-ups on file.  Patient was given instructions and counseling regarding her condition or for health maintenance advice. Please see specific information pulled into the AVS if appropriate.

## 2023-06-16 NOTE — ASSESSMENT & PLAN NOTE
Patient symptoms are stable.  Patient is prescribed Xanax about every 3 months to help control her symptoms.  Patient was given a refill on 6/16/2023.

## 2023-08-14 ENCOUNTER — TELEPHONE (OUTPATIENT)
Dept: FAMILY MEDICINE CLINIC | Facility: CLINIC | Age: 78
End: 2023-08-14
Payer: MEDICARE

## 2023-08-14 DIAGNOSIS — G89.29 CHRONIC BILATERAL LOW BACK PAIN WITH RIGHT-SIDED SCIATICA: Primary | ICD-10-CM

## 2023-08-14 DIAGNOSIS — M54.41 CHRONIC BILATERAL LOW BACK PAIN WITH RIGHT-SIDED SCIATICA: Primary | ICD-10-CM

## 2023-09-19 ENCOUNTER — TELEPHONE (OUTPATIENT)
Dept: FAMILY MEDICINE CLINIC | Facility: CLINIC | Age: 78
End: 2023-09-19

## 2023-09-19 NOTE — TELEPHONE ENCOUNTER
Caller: Lou Shaffer    Relationship: Self    Best call back number: [phone]     What form or medical record are you requesting: TEST RESULTS FROM MRI OF HER BACK THAT WAS DONE ON 7/12    Who is requesting this form or medical record from you: DR SCAR COOL    How would you like to receive the form or medical records (pick-up, mail, fax): FAX  If fax, what is the fax number: [phone]    Timeframe paperwork needed: ASAP    Additional notes: PLEASE ADVISE WHEN THESE ARE SENT

## 2023-10-10 DIAGNOSIS — E03.9 ADULT HYPOTHYROIDISM: ICD-10-CM

## 2023-10-10 RX ORDER — LEVOTHYROXINE SODIUM 0.07 MG/1
75 TABLET ORAL DAILY
Qty: 90 TABLET | Refills: 3 | Status: SHIPPED | OUTPATIENT
Start: 2023-10-10

## 2023-10-10 NOTE — TELEPHONE ENCOUNTER
Caller: Edmund Lou HOOD    Relationship: Self    Best call back number: [phone]    Requested Prescriptions:   Requested Prescriptions     Pending Prescriptions Disp Refills    levothyroxine (SYNTHROID, LEVOTHROID) 75 MCG tablet 90 tablet 3     Sig: Take 1 tablet by mouth Daily.        Pharmacy where request should be sent: [address] [phone] [facility] [phone]      Last office visit with prescribing clinician: 6/15/2023   Last telemedicine visit with prescribing clinician: Visit date not found   Next office visit with prescribing clinician: Visit date not found     Additional details provided by patient:     Does the patient have less than a 3 day supply:  [] Yes  [] No    Would you like a call back once the refill request has been completed: [x] Yes [] No    If the office needs to give you a call back, can they leave a voicemail: [x] Yes [] No    Dayan Osuna Rep   10/10/23 11:16 EDT

## 2023-11-07 RX ORDER — BENAZEPRIL HYDROCHLORIDE 5 MG/1
5 TABLET, FILM COATED ORAL NIGHTLY
Qty: 90 TABLET | Refills: 3 | Status: SHIPPED | OUTPATIENT
Start: 2023-11-07

## 2023-11-07 NOTE — TELEPHONE ENCOUNTER
Caller: Lou Shaffer    Relationship: Self    Requested Prescriptions:   Requested Prescriptions     Pending Prescriptions Disp Refills    benazepril (LOTENSIN) 5 MG tablet 90 tablet 3     Sig: Take 1 tablet by mouth Every Night.        Pharmacy where request should be sent: [facility] [address] [phone] [facility] [phone]      Last office visit with prescribing clinician: 6/15/2023   Last telemedicine visit with prescribing clinician: Visit date not found   Next office visit with prescribing clinician: Visit date not found     Additional details provided by patient: PATIENT HAS ONE WEEK LEFT.     Does the patient have less than a 3 day supply:  [] Yes  [x] No    Would you like a call back once the refill request has been completed: [] Yes [x] No    If the office needs to give you a call back, can they leave a voicemail: [] Yes [x] No    Dayan Aranda Rep   11/07/23 10:30 EST

## 2023-11-17 RX ORDER — BACLOFEN 10 MG/1
10 TABLET ORAL 3 TIMES DAILY PRN
Qty: 90 TABLET | Refills: 11 | Status: SHIPPED | OUTPATIENT
Start: 2023-11-17

## 2023-11-17 RX ORDER — PANTOPRAZOLE SODIUM 40 MG/1
40 TABLET, DELAYED RELEASE ORAL 2 TIMES DAILY
Qty: 180 TABLET | Refills: 3 | Status: SHIPPED | OUTPATIENT
Start: 2023-11-17

## 2023-11-17 NOTE — TELEPHONE ENCOUNTER
Rx Refill Note  Requested Prescriptions     Pending Prescriptions Disp Refills    pantoprazole (PROTONIX) 40 MG EC tablet 180 tablet 3     Sig: Take 1 tablet by mouth 2 (Two) Times a Day.    baclofen (LIORESAL) 10 MG tablet 90 tablet 11     Sig: Take 1 tablet by mouth 3 (Three) Times a Day As Needed for Muscle Spasms.      Last office visit with prescribing clinician: 6/15/2023   Last telemedicine visit with prescribing clinician: Visit date not found   Next office visit with prescribing clinician: Visit date not found                         Would you like a call back once the refill request has been completed: [] Yes [] No    If the office needs to give you a call back, can they leave a voicemail: [] Yes [] No    Aleisha Ji MA  11/17/23, 12:39 EST

## 2024-01-05 ENCOUNTER — OFFICE VISIT (OUTPATIENT)
Dept: FAMILY MEDICINE CLINIC | Facility: CLINIC | Age: 79
End: 2024-01-05
Payer: MEDICARE

## 2024-01-05 VITALS
SYSTOLIC BLOOD PRESSURE: 120 MMHG | TEMPERATURE: 96.8 F | DIASTOLIC BLOOD PRESSURE: 70 MMHG | OXYGEN SATURATION: 97 % | RESPIRATION RATE: 18 BRPM | HEIGHT: 62 IN | BODY MASS INDEX: 22.82 KG/M2 | HEART RATE: 77 BPM | WEIGHT: 124 LBS

## 2024-01-05 DIAGNOSIS — E55.9 VITAMIN D DEFICIENCY: ICD-10-CM

## 2024-01-05 DIAGNOSIS — E03.9 ADULT HYPOTHYROIDISM: ICD-10-CM

## 2024-01-05 DIAGNOSIS — F41.9 ANXIETY: Primary | ICD-10-CM

## 2024-01-05 DIAGNOSIS — G25.81 RESTLESS LEG SYNDROME: ICD-10-CM

## 2024-01-05 DIAGNOSIS — E78.5 HYPERLIPIDEMIA, UNSPECIFIED HYPERLIPIDEMIA TYPE: ICD-10-CM

## 2024-01-05 PROCEDURE — 99214 OFFICE O/P EST MOD 30 MIN: CPT | Performed by: FAMILY MEDICINE

## 2024-01-05 RX ORDER — BACLOFEN 10 MG/1
10 TABLET ORAL 2 TIMES DAILY
Qty: 180 TABLET | Refills: 3 | Status: SHIPPED | OUTPATIENT
Start: 2024-01-05

## 2024-01-05 RX ORDER — ALPRAZOLAM 0.5 MG/1
0.5 TABLET ORAL DAILY PRN
Qty: 45 TABLET | Refills: 1 | Status: SHIPPED | OUTPATIENT
Start: 2024-01-05

## 2024-01-05 RX ORDER — LEVOTHYROXINE SODIUM 0.07 MG/1
75 TABLET ORAL DAILY
Qty: 90 TABLET | Refills: 3 | Status: SHIPPED | OUTPATIENT
Start: 2024-01-05

## 2024-01-05 NOTE — PROGRESS NOTES
"Chief Complaint  Chief Complaint   Patient presents with    Back Pain     Pt here for 6 mon f/u        Subjective    History of Present Illness        Lou Shaffer presents to [facility] for   History of Present Illness  Patient is a 78-year-old female is being seen in clinic for multiple medical problems including restless leg syndrome.  Patient reports that she needs a refill of her baclofen to help treat her symptoms.  She reports her symptoms are stable at this time.  Anxiety  Presents for follow-up visit. Symptoms include nervous/anxious behavior. Patient reports no chest pain, dry mouth, feeling of choking, hyperventilation, irritability, muscle tension, nausea, obsessions, restlessness, shortness of breath or suicidal ideas. The severity of symptoms is moderate. The quality of sleep is fair.     Compliance with medications is %.   Hyperlipidemia  This is a chronic problem. The current episode started more than 1 year ago. The problem is controlled. Recent lipid tests were reviewed and are normal. Exacerbating diseases include hypothyroidism. She has no history of obesity. Pertinent negatives include no chest pain, focal sensory loss or shortness of breath. Current antihyperlipidemic treatment includes statins. The current treatment provides significant improvement of lipids. Risk factors for coronary artery disease include dyslipidemia and family history.   Hypothyroidism  This is a chronic problem. The current episode started more than 1 year ago. The problem occurs constantly. The problem has been unchanged. Pertinent negatives include no chest pain, nausea or urinary symptoms. Nothing aggravates the symptoms. Treatments tried: Levothyroxine. The treatment provided significant relief.        Objective   Vital Signs:   Visit Vitals  /70   Pulse 77   Temp 96.8 °F (36 °C)   Resp 18   Ht 157.5 cm (62\")   Wt 56.2 kg (124 lb)   LMP  (LMP Unknown)   SpO2 97%   BMI " 22.68 kg/m²       BMI is within normal parameters. No other follow-up for BMI required.     Physical Exam  Vitals reviewed.   Constitutional:       Appearance: She is well-developed.   HENT:      Head: Normocephalic.      Right Ear: External ear normal.      Left Ear: External ear normal.      Nose: Nose normal.   Eyes:      Conjunctiva/sclera: Conjunctivae normal.   Cardiovascular:      Rate and Rhythm: Normal rate and regular rhythm.   Pulmonary:      Effort: Pulmonary effort is normal.   Musculoskeletal:         General: Normal range of motion.      Cervical back: Normal range of motion and neck supple.   Skin:     General: Skin is warm and dry.      Capillary Refill: Capillary refill takes less than 2 seconds.   Neurological:      Mental Status: She is alert and oriented to person, place, and time.            Result Review :                    Assessment and Plan      Diagnoses and all orders for this visit:    1. Anxiety (Primary)  Assessment & Plan:  Patient symptoms are stable on Xanax.  No change in treatment.  Patient was encouraged to return to clinic in 3 to 4 months for follow-up.    Orders:  -     ALPRAZolam (XANAX) 0.5 MG tablet; Take 1 tablet by mouth Daily As Needed for Anxiety.  Dispense: 45 tablet; Refill: 1    2. Adult hypothyroidism  Assessment & Plan:  Patient symptoms are stable.  Patient was given a refill of her medication blood work was obtained.    Orders:  -     levothyroxine (SYNTHROID, LEVOTHROID) 75 MCG tablet; Take 1 tablet by mouth Daily.  Dispense: 90 tablet; Refill: 3  -     T3  -     T3, Free  -     T4  -     T4, Free  -     TSH    3. Hyperlipidemia, unspecified hyperlipidemia type  Assessment & Plan:  Lipid abnormalities are unchanged.  Nutritional counseling was provided.  Lipids will be reassessed in 6 months.    Orders:  -     CBC & Differential  -     Comprehensive Metabolic Panel  -     Lipid Panel With / Chol / HDL Ratio    4. Restless leg syndrome  Assessment &  Plan:  Patient given a refill of baclofen to help treat her symptoms.  Patient symptoms are stable at this time.  Patient to return to clinic in 4 months for follow-up.    Orders:  -     baclofen (LIORESAL) 10 MG tablet; Take 1 tablet by mouth 2 (Two) Times a Day.  Dispense: 180 tablet; Refill: 3    5. Vitamin D deficiency  -     Vitamin D,25-Hydroxy             Follow Up   No follow-ups on file.  Patient was given instructions and counseling regarding her condition or for health maintenance advice. Please see specific information pulled into the AVS if appropriate.

## 2024-01-06 LAB
25(OH)D3+25(OH)D2 SERPL-MCNC: 52 NG/ML (ref 30–100)
ALBUMIN SERPL-MCNC: 4.7 G/DL (ref 3.5–5.2)
ALBUMIN/GLOB SERPL: 2.4 G/DL
ALP SERPL-CCNC: 49 U/L (ref 39–117)
ALT SERPL-CCNC: 14 U/L (ref 1–33)
AST SERPL-CCNC: 26 U/L (ref 1–32)
BASOPHILS # BLD AUTO: 0.08 10*3/MM3 (ref 0–0.2)
BASOPHILS NFR BLD AUTO: 1.3 % (ref 0–1.5)
BILIRUB SERPL-MCNC: 0.5 MG/DL (ref 0–1.2)
BUN SERPL-MCNC: 19 MG/DL (ref 8–23)
BUN/CREAT SERPL: 26.8 (ref 7–25)
CALCIUM SERPL-MCNC: 9.9 MG/DL (ref 8.6–10.5)
CHLORIDE SERPL-SCNC: 103 MMOL/L (ref 98–107)
CHOLEST SERPL-MCNC: 144 MG/DL (ref 0–200)
CHOLEST/HDLC SERPL: 1.85 {RATIO}
CO2 SERPL-SCNC: 25.4 MMOL/L (ref 22–29)
CREAT SERPL-MCNC: 0.71 MG/DL (ref 0.57–1)
EGFRCR SERPLBLD CKD-EPI 2021: 86.6 ML/MIN/1.73
EOSINOPHIL # BLD AUTO: 0.44 10*3/MM3 (ref 0–0.4)
EOSINOPHIL NFR BLD AUTO: 7.3 % (ref 0.3–6.2)
ERYTHROCYTE [DISTWIDTH] IN BLOOD BY AUTOMATED COUNT: 13.8 % (ref 12.3–15.4)
GLOBULIN SER CALC-MCNC: 2 GM/DL
GLUCOSE SERPL-MCNC: 83 MG/DL (ref 65–99)
HCT VFR BLD AUTO: 39.1 % (ref 34–46.6)
HDLC SERPL-MCNC: 78 MG/DL (ref 40–60)
HGB BLD-MCNC: 12.7 G/DL (ref 12–15.9)
IMM GRANULOCYTES # BLD AUTO: 0.04 10*3/MM3 (ref 0–0.05)
IMM GRANULOCYTES NFR BLD AUTO: 0.7 % (ref 0–0.5)
LDLC SERPL CALC-MCNC: 52 MG/DL (ref 0–100)
LYMPHOCYTES # BLD AUTO: 1.24 10*3/MM3 (ref 0.7–3.1)
LYMPHOCYTES NFR BLD AUTO: 20.7 % (ref 19.6–45.3)
MCH RBC QN AUTO: 27.9 PG (ref 26.6–33)
MCHC RBC AUTO-ENTMCNC: 32.5 G/DL (ref 31.5–35.7)
MCV RBC AUTO: 85.9 FL (ref 79–97)
MONOCYTES # BLD AUTO: 0.55 10*3/MM3 (ref 0.1–0.9)
MONOCYTES NFR BLD AUTO: 9.2 % (ref 5–12)
NEUTROPHILS # BLD AUTO: 3.65 10*3/MM3 (ref 1.7–7)
NEUTROPHILS NFR BLD AUTO: 60.8 % (ref 42.7–76)
NRBC BLD AUTO-RTO: 0 /100 WBC (ref 0–0.2)
PLATELET # BLD AUTO: 325 10*3/MM3 (ref 140–450)
POTASSIUM SERPL-SCNC: 4.5 MMOL/L (ref 3.5–5.2)
PROT SERPL-MCNC: 6.7 G/DL (ref 6–8.5)
RBC # BLD AUTO: 4.55 10*6/MM3 (ref 3.77–5.28)
SODIUM SERPL-SCNC: 139 MMOL/L (ref 136–145)
T3 SERPL-MCNC: 72.1 NG/DL (ref 80–200)
T3FREE SERPL-MCNC: 2.3 PG/ML (ref 2–4.4)
T4 FREE SERPL-MCNC: 2.41 NG/DL (ref 0.93–1.7)
T4 SERPL-MCNC: 10.7 MCG/DL (ref 4.5–11.7)
TRIGL SERPL-MCNC: 68 MG/DL (ref 0–150)
TSH SERPL DL<=0.005 MIU/L-ACNC: 0.78 UIU/ML (ref 0.27–4.2)
VLDLC SERPL CALC-MCNC: 14 MG/DL (ref 5–40)
WBC # BLD AUTO: 6 10*3/MM3 (ref 3.4–10.8)

## 2024-01-07 PROBLEM — G25.81 RESTLESS LEG SYNDROME: Status: ACTIVE | Noted: 2024-01-07

## 2024-01-08 NOTE — ASSESSMENT & PLAN NOTE
Patient symptoms are stable on Xanax.  No change in treatment.  Patient was encouraged to return to clinic in 3 to 4 months for follow-up.

## 2024-01-08 NOTE — ASSESSMENT & PLAN NOTE
Patient symptoms are stable.  Patient was given a refill of her medication blood work was obtained.

## 2024-01-08 NOTE — ASSESSMENT & PLAN NOTE
Patient given a refill of baclofen to help treat her symptoms.  Patient symptoms are stable at this time.  Patient to return to clinic in 4 months for follow-up.

## 2024-02-09 ENCOUNTER — OFFICE VISIT (OUTPATIENT)
Dept: FAMILY MEDICINE CLINIC | Facility: CLINIC | Age: 79
End: 2024-02-09
Payer: MEDICARE

## 2024-02-09 VITALS
SYSTOLIC BLOOD PRESSURE: 110 MMHG | DIASTOLIC BLOOD PRESSURE: 60 MMHG | OXYGEN SATURATION: 98 % | WEIGHT: 124 LBS | TEMPERATURE: 96.4 F | RESPIRATION RATE: 18 BRPM | HEIGHT: 62 IN | HEART RATE: 76 BPM | BODY MASS INDEX: 22.82 KG/M2

## 2024-02-09 DIAGNOSIS — F41.9 ANXIETY: Primary | ICD-10-CM

## 2024-02-09 PROCEDURE — 99213 OFFICE O/P EST LOW 20 MIN: CPT | Performed by: FAMILY MEDICINE

## 2024-02-09 RX ORDER — DESONIDE 0.5 MG/G
CREAM TOPICAL 2 TIMES DAILY
Qty: 60 G | Refills: 2 | Status: SHIPPED | OUTPATIENT
Start: 2024-02-09

## 2024-02-09 NOTE — PROGRESS NOTES
"Chief Complaint  Chief Complaint   Patient presents with    Anxiety     1 mon f/u       Subjective    History of Present Illness        Lou Shaffer presents to [facility] for   Anxiety  Presents for follow-up visit. Symptoms include nervous/anxious behavior. Patient reports no chest pain, compulsions, confusion, decreased concentration, depressed mood, dizziness, feeling of choking, irritability, malaise, muscle tension, nausea, obsessions or palpitations.            Objective   Vital Signs:   Visit Vitals  /60   Pulse 76   Temp 96.4 °F (35.8 °C)   Resp 18   Ht 157.5 cm (62\")   Wt 56.2 kg (124 lb)   LMP  (LMP Unknown)   SpO2 98%   BMI 22.68 kg/m²       BMI is within normal parameters. No other follow-up for BMI required.     Physical Exam  Vitals reviewed.   Constitutional:       Appearance: She is well-developed.   HENT:      Head: Normocephalic.      Right Ear: External ear normal.      Left Ear: External ear normal.      Nose: Nose normal.   Eyes:      Conjunctiva/sclera: Conjunctivae normal.   Cardiovascular:      Rate and Rhythm: Normal rate and regular rhythm.   Pulmonary:      Effort: Pulmonary effort is normal.      Breath sounds: Normal breath sounds.   Musculoskeletal:         General: Normal range of motion.      Cervical back: Normal range of motion and neck supple.   Skin:     General: Skin is warm and dry.      Capillary Refill: Capillary refill takes less than 2 seconds.   Neurological:      Mental Status: She is alert and oriented to person, place, and time.            Result Review :                    Assessment and Plan      Diagnoses and all orders for this visit:    1. Anxiety (Primary)  Assessment & Plan:  Patient symptoms are stable.  No change in treatment options.      Other orders  -     desonide (DESOWEN) 0.05 % cream; Apply  topically to the appropriate area as directed 2 (Two) Times a Day.  Dispense: 60 g; Refill: 2             Follow Up   No " follow-ups on file.  Patient was given instructions and counseling regarding her condition or for health maintenance advice. Please see specific information pulled into the AVS if appropriate.

## 2024-04-29 ENCOUNTER — TELEPHONE (OUTPATIENT)
Dept: FAMILY MEDICINE CLINIC | Facility: CLINIC | Age: 79
End: 2024-04-29

## 2024-04-29 NOTE — TELEPHONE ENCOUNTER
Caller: Lou Shaffer    Relationship to patient: Self    Best call back number: [phone]    Patient is needing: SHE WOULD LIKE TO KNOW IF YOU NEED TO SEE HER EVERY 3 MONTHS OR EVERY 6 MONTHS?  PLEASE CALL AND ADVISE.

## 2024-05-13 ENCOUNTER — OFFICE VISIT (OUTPATIENT)
Dept: ORTHOPEDIC SURGERY | Facility: CLINIC | Age: 79
End: 2024-05-13
Payer: MEDICARE

## 2024-05-13 VITALS — HEIGHT: 62 IN | BODY MASS INDEX: 22.91 KG/M2 | TEMPERATURE: 98.4 F | WEIGHT: 124.5 LBS

## 2024-05-13 DIAGNOSIS — R52 PAIN: ICD-10-CM

## 2024-05-13 DIAGNOSIS — M17.11 PRIMARY OSTEOARTHRITIS OF RIGHT KNEE: Primary | ICD-10-CM

## 2024-05-13 PROCEDURE — 1160F RVW MEDS BY RX/DR IN RCRD: CPT | Performed by: ORTHOPAEDIC SURGERY

## 2024-05-13 PROCEDURE — 20610 DRAIN/INJ JOINT/BURSA W/O US: CPT | Performed by: ORTHOPAEDIC SURGERY

## 2024-05-13 PROCEDURE — 72170 X-RAY EXAM OF PELVIS: CPT | Performed by: ORTHOPAEDIC SURGERY

## 2024-05-13 PROCEDURE — 1159F MED LIST DOCD IN RCRD: CPT | Performed by: ORTHOPAEDIC SURGERY

## 2024-05-13 PROCEDURE — 99214 OFFICE O/P EST MOD 30 MIN: CPT | Performed by: ORTHOPAEDIC SURGERY

## 2024-05-13 PROCEDURE — 73562 X-RAY EXAM OF KNEE 3: CPT | Performed by: ORTHOPAEDIC SURGERY

## 2024-05-13 RX ORDER — HYDROCODONE BITARTRATE AND ACETAMINOPHEN 7.5; 325 MG/1; MG/1
TABLET ORAL
COMMUNITY
Start: 2024-03-06

## 2024-05-13 RX ADMIN — LIDOCAINE HYDROCHLORIDE 2 ML: 10 INJECTION, SOLUTION EPIDURAL; INFILTRATION; INTRACAUDAL; PERINEURAL at 15:31

## 2024-05-13 RX ADMIN — METHYLPREDNISOLONE ACETATE 80 MG: 80 INJECTION, SUSPENSION INTRA-ARTICULAR; INTRALESIONAL; INTRAMUSCULAR; SOFT TISSUE at 15:31

## 2024-05-13 NOTE — PROGRESS NOTES
Patient ID: Lou Shaffer     Chief Complaint:    Chief Complaint   Patient presents with    Right Knee - Follow-up, Pain        HPI:    Lou Shaffer is a 78 y.o. who presents today for evaluation of right knee pain.  Patient states on April 1 she felt she overdid it.  She is having some discomfort during the day particularly at night achy in nature.  More medially sided based.  Denies any actual injury.  Reports she feels that her hamstring feels little tight.  Tylenol helps.    Social History     Socioeconomic History    Marital status:    Tobacco Use    Smoking status: Never    Smokeless tobacco: Never   Vaping Use    Vaping status: Never Used   Substance and Sexual Activity    Alcohol use: Yes     Comment: MODERATE AMT    Drug use: No    Sexual activity: Yes     Partners: Male     Past Medical History:   Diagnosis Date    Acne     Adnexal mass 2002    RIGHT, S/P JAZMIN BSO    Anxiety     Breast cancer     Chronic headaches 01/04/2016    MRI OF BRAIN AT [facility] SHOWED PARTIAL OPACIFICATIONS OF THE FRONTAL ETHMOID AND MAXILLARY SINUSES AS WELL AS LEFT SPHENOID SINUS WITH FLUID AND MUCOSAL THICKENING. MINIMAL SMALL VESSEL DISEASE IN THE CEREBRAL WHITE MATTER. THE REMAINDER IS WNL, NO SIGNIFICANT CHANGE WHEN COMPARED TO PRIOR MRI ON 1/20/2009.    Chronic low back pain     Collagenous colitis 07/2019    DDD (degenerative disc disease), cervical 2005    Diarrhea 01/2019    Disease of thyroid gland     HYPOTHYROIDISM    Dizziness 06/11/2018    SEEN AT [facility] ER    Dry skin     Dysuria 11/2019    Erosive gastritis with hemorrhage     Fall 03/10/2014    CONTUSION OF COCCYX, SEEN AT [facility] ER    GERD (gastroesophageal reflux disease)     Glaucoma     H/O hemorrhoids     History of strabismus     Hormone replacement therapy (HRT)     Hot flashes     Hyperlipidemia     Hypertension     Hypertensive emergency 01/11/2006    SEEN AT [facility] ER    Insomnia     Leukopenia 11/2019    Night sweats 01/2018    Positive fecal occult  "blood test 01/19/2018    Post-menopausal     PUD (peptic ulcer disease)     Rectal bleeding 07/2019    Scar of nose     Sleep apnea     Trigger thumb of both hands 04/2018    Vitamin D deficiency      Family History   Problem Relation Age of Onset    Pneumonia Mother     COPD Father     Coronary artery disease Sister     Breast cancer Sister     Cancer Sister     Lung cancer Sister     Cancer Sister     Stroke Brother         CVA    Heart disease Brother     Heart attack Brother     Ovarian cancer Neg Hx     Colon cancer Neg Hx     Colon polyps Neg Hx     Crohn's disease Neg Hx     Irritable bowel syndrome Neg Hx     Ulcerative colitis Neg Hx        ROS:    ROS:  Constitutional:  Denies fever, shaking or chills         All other pertinent positives and negatives as noted above in HPI.    Physical Exam:     Vital Signs:  Temp 98.4 °F (36.9 °C) (Temporal)   Ht 157.5 cm (62.01\")   Wt 56.5 kg (124 lb 8 oz)   LMP  (LMP Unknown)   BMI 22.77 kg/m²   Constitutional: Awake alert and oriented x3, well developed, well nourished, no acute distress, non-toxic appearance.      Musculoskeletal:    Exam of the left  knee:    No muscle atrophy, erythema, ecchymosis, or gross deformity noted  no knee effusion  No joint line tenderness  Active range of motion 3-125  5/5 strength flexion and extension  The knee is stable to varus and valgus stress testing  Mild varus alignment of the limb  Lachman negative  Posterior drawer negative  Ruby's negative  Patellofemoral grind +  Sensation grossly intact to light tough throughout the lower extremity  Skin is intact  Distal pulses are 2+  No signs or symptoms of DVT          Diagnostic Studies:     Imaging was personally and individually reviewed and discussed at length with the patient:    4V right knee(s) were taken in the office today, including AP, flexion PA, lateral, and sunrise views to evaluate the patient's complaint:  Weight bearing views show mild to moderate degenerative " changes in all three compartments with the medial compartment being most affected.  There is early osteophyte formation throughout all three compartments.  There is no evidence of fracture or dislocation.  No periosteal reactions or medullary lesions are seen.  Patellar height and alignment are within normal limits.     Comparison films show slight progression in the medial compartment of the right knee.    AP pelvis was taken in the office today: Overall hip joint space bilaterally seems preserved.  Evidence of enthesophyte just lateral to greater trochanter on the right.  Evidence of lumbar degenerative changes noted.            Assessment:     right  Knee Osteoarthritis            Plan:     Based on x-rays, history, and office evaluation, we have diagnosed Lou Shaffer with knee arthritis. At this time, we recommend starting with a conservative treatment program. This will consist of cortisone injection during significant flare-ups, NSAIDS for daily maintenance, physical therapy for strengthening and modalities as indicated, and bracing when appropriate. These measures will continue, until symptoms are no longer relieved, become more severe or function begins to significantly deteriorate. At that point joint replacement options will be discussed.    Plan conservative treatment will proceed with injection today.  She will continue conservative treatment as above.    Follow up in as needed unless symptoms return or a new issue occurs.  Patient will call the office to schedule an appointment.     All questions were answered, the patient understands and agrees with the plan.             Large Joint Arthrocentesis: R knee  Date/Time: 5/13/2024 3:31 PM  Consent given by: patient  Site marked: site marked  Timeout: Immediately prior to procedure a time out was called to verify the correct patient, procedure, equipment, support staff and site/side marked as required   Supporting Documentation  Indications: pain    Procedure Details  Location: knee - R knee  Preparation: Patient was prepped and draped in the usual sterile fashion  Needle gauge: 21g.  Approach: lateral  Medications administered: 2 mL lidocaine PF 1% 1 %; 80 mg methylPREDNISolone acetate 80 MG/ML  Patient tolerance: patient tolerated the procedure well with no immediate complications

## 2024-05-20 RX ORDER — LIDOCAINE HYDROCHLORIDE 10 MG/ML
2 INJECTION, SOLUTION EPIDURAL; INFILTRATION; INTRACAUDAL; PERINEURAL
Status: COMPLETED | OUTPATIENT
Start: 2024-05-13 | End: 2024-05-13

## 2024-05-20 RX ORDER — METHYLPREDNISOLONE ACETATE 80 MG/ML
80 INJECTION, SUSPENSION INTRA-ARTICULAR; INTRALESIONAL; INTRAMUSCULAR; SOFT TISSUE
Status: COMPLETED | OUTPATIENT
Start: 2024-05-13 | End: 2024-05-13

## 2024-05-23 ENCOUNTER — OFFICE VISIT (OUTPATIENT)
Dept: FAMILY MEDICINE CLINIC | Facility: CLINIC | Age: 79
End: 2024-05-23
Payer: MEDICARE

## 2024-05-23 VITALS
WEIGHT: 126 LBS | OXYGEN SATURATION: 98 % | DIASTOLIC BLOOD PRESSURE: 84 MMHG | SYSTOLIC BLOOD PRESSURE: 128 MMHG | HEIGHT: 62 IN | RESPIRATION RATE: 17 BRPM | HEART RATE: 85 BPM | TEMPERATURE: 97.1 F | BODY MASS INDEX: 23.19 KG/M2

## 2024-05-23 DIAGNOSIS — F41.9 ANXIETY: ICD-10-CM

## 2024-05-23 PROCEDURE — 1126F AMNT PAIN NOTED NONE PRSNT: CPT | Performed by: FAMILY MEDICINE

## 2024-05-23 PROCEDURE — 99213 OFFICE O/P EST LOW 20 MIN: CPT | Performed by: FAMILY MEDICINE

## 2024-05-23 RX ORDER — ALPRAZOLAM 0.5 MG/1
0.5 TABLET ORAL DAILY PRN
Qty: 45 TABLET | Refills: 1 | Status: SHIPPED | OUTPATIENT
Start: 2024-05-23

## 2024-05-23 NOTE — PROGRESS NOTES
"Chief Complaint  Chief Complaint   Patient presents with    Anxiety     3 mon f/u and refills        Subjective    History of Present Illness        Lou Shaffer presents to [facility] for   Anxiety  Presents for follow-up visit.  Symptoms include nervous/anxious behavior.  Patient reports no compulsions, confusion, dizziness, dry mouth, feeling of choking, malaise, muscle tension, nausea, palpitations or suicidal ideas. The severity of symptoms is moderate. The quality of sleep is fair. The treatment provided significant relief. Compliance with medications is %.      History of Present Illness      Objective   Vital Signs:   Visit Vitals  /84   Pulse 85   Temp 97.1 °F (36.2 °C)   Resp 17   Ht 157.5 cm (62.01\")   Wt 57.2 kg (126 lb)   LMP  (LMP Unknown)   SpO2 98%   BMI 23.04 kg/m²          BMI is within normal parameters. No other follow-up for BMI required.     Physical Exam  Vitals reviewed.   Constitutional:       Appearance: She is well-developed.   HENT:      Head: Normocephalic.      Right Ear: External ear normal.      Left Ear: External ear normal.      Nose: Nose normal.   Eyes:      Conjunctiva/sclera: Conjunctivae normal.   Cardiovascular:      Rate and Rhythm: Normal rate and regular rhythm.   Pulmonary:      Effort: Pulmonary effort is normal.      Breath sounds: Normal breath sounds.   Musculoskeletal:         General: Normal range of motion.      Cervical back: Normal range of motion and neck supple.   Skin:     General: Skin is warm and dry.      Capillary Refill: Capillary refill takes less than 2 seconds.   Neurological:      Mental Status: She is alert and oriented to person, place, and time.        Physical Exam           Result Review :  Results                            Assessment and Plan      Diagnoses and all orders for this visit:    1. Anxiety  Assessment & Plan:  Patient symptoms are stable.  Patient given a refill of Xanax to help treat " his symptoms.  Patient was encouraged return to clinic in 3 months for follow-up.    Orders:  -     ALPRAZolam (XANAX) 0.5 MG tablet; Take 1 tablet by mouth Daily As Needed for Anxiety.  Dispense: 45 tablet; Refill: 1       Assessment & Plan             Follow Up   No follow-ups on file.  Patient was given instructions and counseling regarding her condition or for health maintenance advice. Please see specific information pulled into the AVS if appropriate.

## 2024-06-01 NOTE — ASSESSMENT & PLAN NOTE
Patient symptoms are stable.  Patient given a refill of Xanax to help treat his symptoms.  Patient was encouraged return to clinic in 3 months for follow-up.

## 2024-06-11 RX ORDER — FENOFIBRATE 160 MG/1
160 TABLET ORAL DAILY
Qty: 90 TABLET | Refills: 3 | Status: SHIPPED | OUTPATIENT
Start: 2024-06-11

## 2024-06-11 NOTE — TELEPHONE ENCOUNTER
Caller: Lou Shaffer    Relationship: Self    Best call back number: [phone]     Requested Prescriptions:   Requested Prescriptions     Pending Prescriptions Disp Refills    fenofibrate 160 MG tablet 90 tablet 3     Sig: Take 1 tablet by mouth Daily.        Pharmacy where request should be sent: [address] [phone] [facility] [phone]      Last office visit with prescribing clinician: 5/23/2024   Last telemedicine visit with prescribing clinician: Visit date not found   Next office visit with prescribing clinician: Visit date not found     Additional details provided by patient:     Does the patient have less than a 3 day supply:  [] Yes  [] No    Would you like a call back once the refill request has been completed: [] Yes [] No    If the office needs to give you a call back, can they leave a voicemail: [] Yes [] No    April Dayan Johnson Rep   06/11/24 10:30 EDT

## 2024-06-11 NOTE — TELEPHONE ENCOUNTER
Caller: Lou Shaffer    Relationship: Self    Best call back number: [phone]     Requested Prescriptions:   Requested Prescriptions     Pending Prescriptions Disp Refills    Diclofenac Sodium (VOLTAREN) 1 % gel gel 100 g 1        Pharmacy where request should be sent: [facility] [id number] [facility] [address] [facility] [address] AT [facility] [address] [facility] [phone] [facility] [phone] FX     Last office visit with prescribing clinician: 5/23/2024   Last telemedicine visit with prescribing clinician: Visit date not found   Next office visit with prescribing clinician: Visit date not found     Additional details provided by patient:     Does the patient have less than a 3 day supply:  [] Yes  [] No    Would you like a call back once the refill request has been completed: [] Yes [] No    If the office needs to give you a call back, can they leave a voicemail: [] Yes [] No    April Dayan Johnson Rep   06/11/24 10:32 EDT

## 2024-06-11 NOTE — TELEPHONE ENCOUNTER
Rx Refill Note  Requested Prescriptions     Pending Prescriptions Disp Refills    fenofibrate 160 MG tablet 90 tablet 3     Sig: Take 1 tablet by mouth Daily.      Last office visit with prescribing clinician: 5/23/2024   Last telemedicine visit with prescribing clinician: Visit date not found   Next office visit with prescribing clinician: 6/11/2024                         Would you like a call back once the refill request has been completed: [] Yes [] No    If the office needs to give you a call back, can they leave a voicemail: [] Yes [] No    Donny Rome MA  06/11/24, 11:54 EDT

## 2024-06-11 NOTE — TELEPHONE ENCOUNTER
Rx Refill Note  Requested Prescriptions     Pending Prescriptions Disp Refills    Diclofenac Sodium (VOLTAREN) 1 % gel gel 100 g 1      Last office visit with prescribing clinician: 5/23/2024   Last telemedicine visit with prescribing clinician: Visit date not found   Next office visit with prescribing clinician: Visit date not found                         Would you like a call back once the refill request has been completed: [] Yes [] No    If the office needs to give you a call back, can they leave a voicemail: [] Yes [] No    Donny Rome MA  06/11/24, 11:54 EDT

## 2024-07-22 ENCOUNTER — TELEPHONE (OUTPATIENT)
Dept: FAMILY MEDICINE CLINIC | Facility: CLINIC | Age: 79
End: 2024-07-22
Payer: MEDICARE

## 2024-07-30 ENCOUNTER — OFFICE VISIT (OUTPATIENT)
Dept: ORTHOPEDIC SURGERY | Facility: CLINIC | Age: 79
End: 2024-07-30
Payer: MEDICARE

## 2024-07-30 VITALS — TEMPERATURE: 98.9 F | HEIGHT: 62 IN | BODY MASS INDEX: 23.34 KG/M2 | WEIGHT: 126.8 LBS

## 2024-07-30 DIAGNOSIS — M17.11 PRIMARY OSTEOARTHRITIS OF RIGHT KNEE: Primary | ICD-10-CM

## 2024-07-30 PROCEDURE — 99214 OFFICE O/P EST MOD 30 MIN: CPT | Performed by: ORTHOPAEDIC SURGERY

## 2024-07-30 PROCEDURE — 1159F MED LIST DOCD IN RCRD: CPT | Performed by: ORTHOPAEDIC SURGERY

## 2024-07-30 PROCEDURE — 1160F RVW MEDS BY RX/DR IN RCRD: CPT | Performed by: ORTHOPAEDIC SURGERY

## 2024-07-30 RX ORDER — ACETAMINOPHEN 10 MG/ML
1000 INJECTION, SOLUTION INTRAVENOUS ONCE
OUTPATIENT
Start: 2024-07-30

## 2024-07-30 RX ORDER — PREGABALIN 150 MG/1
150 CAPSULE ORAL ONCE
OUTPATIENT
Start: 2024-07-30 | End: 2024-07-30

## 2024-07-30 RX ORDER — CHLORHEXIDINE GLUCONATE 500 MG/1
CLOTH TOPICAL SEE ADMIN INSTRUCTIONS
OUTPATIENT
Start: 2024-07-30

## 2024-07-30 RX ORDER — MELOXICAM 7.5 MG/1
7.5 TABLET ORAL ONCE
OUTPATIENT
Start: 2024-07-30

## 2024-07-30 NOTE — PROGRESS NOTES
Patient: Lou Shaffer  YOB: 1945 78 y.o. female  Medical Record Number: [id number]    Chief Complaints:   Chief Complaint   Patient presents with    Right Knee - Pain, Follow-up       History of Present Illness:Lou Shaffer is a 78 y.o. female who presents for follow-up of right knee pain.  She was seen previously diagnosed knee arthritis.  Tried injection is not effective.  She tried over-the-counter medications and activity modification continues to be symptomatic symptoms are disrupting her normal daily activities and overall quality of life.  She is here discuss other options.    Allergies:   Allergies   Allergen Reactions    Meperidine Nausea And Vomiting       Medications:   Current Outpatient Medications   Medication Sig Dispense Refill    ALPRAZolam (XANAX) 0.5 MG tablet Take 1 tablet by mouth Daily As Needed for Anxiety. 45 tablet 1    baclofen (LIORESAL) 10 MG tablet Take 1 tablet by mouth 2 (Two) Times a Day. 180 tablet 3    benazepril (LOTENSIN) 5 MG tablet Take 1 tablet by mouth Every Night. 90 tablet 3    Budesonide (ENTOCORT EC) 3 MG 24 hr capsule Take 3 capsules by mouth Daily.      Cholecalciferol (VITAMIN D-3) 1000 UNITS capsule Take by mouth.      desonide (DESOWEN) 0.05 % cream Apply  topically to the appropriate area as directed 2 (Two) Times a Day. 60 g 2    Diclofenac Sodium (VOLTAREN) 1 % gel gel Apply  topically to the appropriate area as directed 4 (Four) Times a Day. 100 g 1    fenofibrate 160 MG tablet Take 1 tablet by mouth Daily. 90 tablet 3    HYDROcodone-acetaminophen (NORCO) 7.5-325 MG per tablet       hydrocortisone 2.5 % cream APPLY TOPICALLY TWICE A DAY TO AFFECTED AREA(S)      levothyroxine (SYNTHROID, LEVOTHROID) 75 MCG tablet Take 1 tablet by mouth Daily. 90 tablet 3    magnesium oxide (MAG-OX) 400 MG tablet Take 1 tablet by mouth 2 (two) times a day.      meclizine (ANTIVERT) 12.5 MG tablet Take 1 tablet by mouth 3 (Three) Times a Day As Needed for  "Dizziness. 30 tablet 1    pantoprazole (PROTONIX) 40 MG EC tablet Take 1 tablet by mouth 2 (Two) Times a Day. 180 tablet 3    prednisoLONE acetate (PRED FORTE) 1 % ophthalmic suspension        No current facility-administered medications for this visit.         The following portions of the patient's history were reviewed and updated as appropriate: allergies, current medications, past family history, past medical history, past social history, past surgical history and problem list.    Review of Systems:   A 14 point review of systems was performed. All systems negative except pertinent positives/negative listed in HPI above    Physical Exam:   Vitals:    07/30/24 1443   Temp: 98.9 °F (37.2 °C)   Weight: 57.5 kg (126 lb 12.8 oz)   Height: 157.5 cm (62\")   PainSc:   8   PainLoc: Knee       General: A and O x 3, ASA, NAD    SCLERA:    Normal    DENTITION:   Normal      Right knee examined: knee range of motion 2 degrees to 100 degrees.  Positive medial tenderness.        Assessment/Plan:  Right knee osteoarthritis    I discussed conservative and surgical treatment options with the patient.  She tried failed multiple Zerr treatments.  Her symptoms are affecting her normal daily activities and overall quality life.  This time I feel that she be a good candidate for right total knee replacement surgery.  Discussed surgery in detail with the patient including risk and benefits.   I did discuss risk and benefits with the patient with risk including but not limited to bleeding, infection, damage to nearby nerves, vessels, tendons, ligaments, continued pain, worsening pain, fracture, dislocation, leg length discrepancy, blood clots, even death with anesthesia and possible need for future procedures surgeries.  Patient understood this and has chosen to proceed.      Plan proceed with right total knee replacement    Plan for overnight stay/23-hour observation.      All questions answered.  Patient understands agrees the " plan.

## 2024-08-12 ENCOUNTER — OFFICE VISIT (OUTPATIENT)
Dept: FAMILY MEDICINE CLINIC | Facility: CLINIC | Age: 79
End: 2024-08-12
Payer: MEDICARE

## 2024-08-12 VITALS
HEIGHT: 62 IN | WEIGHT: 126 LBS | SYSTOLIC BLOOD PRESSURE: 110 MMHG | RESPIRATION RATE: 18 BRPM | DIASTOLIC BLOOD PRESSURE: 62 MMHG | BODY MASS INDEX: 23.19 KG/M2 | OXYGEN SATURATION: 96 % | HEART RATE: 85 BPM

## 2024-08-12 DIAGNOSIS — M54.42 CHRONIC BILATERAL LOW BACK PAIN WITH BILATERAL SCIATICA: ICD-10-CM

## 2024-08-12 DIAGNOSIS — M54.41 CHRONIC BILATERAL LOW BACK PAIN WITH BILATERAL SCIATICA: ICD-10-CM

## 2024-08-12 DIAGNOSIS — G89.29 CHRONIC BILATERAL LOW BACK PAIN WITH BILATERAL SCIATICA: ICD-10-CM

## 2024-08-12 DIAGNOSIS — Z00.00 MEDICARE ANNUAL WELLNESS VISIT, SUBSEQUENT: Primary | ICD-10-CM

## 2024-08-12 PROCEDURE — 1159F MED LIST DOCD IN RCRD: CPT | Performed by: FAMILY MEDICINE

## 2024-08-12 PROCEDURE — 1125F AMNT PAIN NOTED PAIN PRSNT: CPT | Performed by: FAMILY MEDICINE

## 2024-08-12 PROCEDURE — 1160F RVW MEDS BY RX/DR IN RCRD: CPT | Performed by: FAMILY MEDICINE

## 2024-08-12 PROCEDURE — G0439 PPPS, SUBSEQ VISIT: HCPCS | Performed by: FAMILY MEDICINE

## 2024-08-12 PROCEDURE — 99214 OFFICE O/P EST MOD 30 MIN: CPT | Performed by: FAMILY MEDICINE

## 2024-08-12 PROCEDURE — 96160 PT-FOCUSED HLTH RISK ASSMT: CPT | Performed by: FAMILY MEDICINE

## 2024-08-12 RX ORDER — PREDNISONE 20 MG/1
40 TABLET ORAL DAILY
Qty: 10 TABLET | Refills: 0 | Status: SHIPPED | OUTPATIENT
Start: 2024-08-12 | End: 2024-08-17

## 2024-08-12 NOTE — PROGRESS NOTES
Subjective   The ABCs of the Annual Wellness Visit  Medicare Wellness Visit      Lou Shaffer is a 78 y.o. patient who presents for a Medicare Wellness Visit.    The following portions of the patient's history were reviewed and   updated as appropriate: allergies, current medications, past family history, past medical history, past social history, past surgical history, and problem list.    Compared to one year ago, the patient's physical   health is the same.  Compared to one year ago, the patient's mental   health is better.    Recent Hospitalizations:  She was not admitted to the hospital during the last year.     Current Medical Providers:  Patient Care Team:  Juan C Parish Sr., MD as PCP - General (Family Medicine)  Asiya Alfredo APRN as Nurse Practitioner (Gastroenterology)    Outpatient Medications Prior to Visit   Medication Sig Dispense Refill    baclofen (LIORESAL) 10 MG tablet Take 1 tablet by mouth 2 (Two) Times a Day. 180 tablet 3    benazepril (LOTENSIN) 5 MG tablet Take 1 tablet by mouth Every Night. 90 tablet 3    Budesonide (ENTOCORT EC) 3 MG 24 hr capsule Take 3 capsules by mouth Daily.      Cholecalciferol (VITAMIN D-3) 1000 UNITS capsule Take by mouth.      desonide (DESOWEN) 0.05 % cream Apply  topically to the appropriate area as directed 2 (Two) Times a Day. 60 g 2    Diclofenac Sodium (VOLTAREN) 1 % gel gel Apply  topically to the appropriate area as directed 4 (Four) Times a Day. 100 g 1    fenofibrate 160 MG tablet Take 1 tablet by mouth Daily. 90 tablet 3    HYDROcodone-acetaminophen (NORCO) 7.5-325 MG per tablet       hydrocortisone 2.5 % cream APPLY TOPICALLY TWICE A DAY TO AFFECTED AREA(S)      levothyroxine (SYNTHROID, LEVOTHROID) 75 MCG tablet Take 1 tablet by mouth Daily. 90 tablet 3    magnesium oxide (MAG-OX) 400 MG tablet Take 1 tablet by mouth 2 (two) times a day.      meclizine (ANTIVERT) 12.5 MG tablet Take 1 tablet by mouth 3 (Three) Times a Day As Needed for  "Dizziness. 30 tablet 1    pantoprazole (PROTONIX) 40 MG EC tablet Take 1 tablet by mouth 2 (Two) Times a Day. 180 tablet 3    prednisoLONE acetate (PRED FORTE) 1 % ophthalmic suspension       ALPRAZolam (XANAX) 0.5 MG tablet Take 1 tablet by mouth Daily As Needed for Anxiety. 45 tablet 1     No facility-administered medications prior to visit.     Opioid medication/s are on active medication list.  and I have evaluated her active treatment plan and pain score trends (see table).  There were no vitals filed for this visit.  I have reviewed the chart for potential of high risk medication and harmful drug interactions in the elderly.        Aspirin is not on active medication list.   none .    Patient Active Problem List   Diagnosis    Anxiety    Dry skin    Hyperlipidemia    Adult hypothyroidism    Insomnia    Vitamin D deficiency    Hot flashes due to menopause    Anal polyp    Diarrhea    Chronic bilateral low back pain with bilateral sciatica    Chronic angle-closure glaucoma, bilateral, indeterminate stage    Secondary corneal edema, bilateral    Restless leg syndrome    Osteoarthritis of right knee    Medicare annual wellness visit, subsequent     Advance Care Planning Advance Directive is not on file.  ACP discussion was held with the patient during this visit. Patient has an advance directive (not in EMR), copy requested.            Objective   Vitals:    08/12/24 1404   BP: 110/62   Pulse: 85   Resp: 18   SpO2: 96%   Weight: 57.2 kg (126 lb)   Height: 157.5 cm (62\")       Estimated body mass index is 23.05 kg/m² as calculated from the following:    Height as of this encounter: 157.5 cm (62\").    Weight as of this encounter: 57.2 kg (126 lb).    BMI is within normal parameters. No other follow-up for BMI required.       Does the patient have evidence of cognitive impairment? No          Mini-Mental State Examination (MMSE)        Instructions: Ask the questions in the order listed. Score one point for each " correct response within each question or activity.      Maximum Score  Patient’s Score  Questions    5   5 “What is the year?  Season?  Date?  Day of the week?  Month?”    5   5 “Where are we now: State?  County?  Town/city?  Hospital?  Floor?”    3  3  The examiner names three unrelated objects clearly and slowly, then asks the patient to name all three of them. The patient’s response is used for scoring. The examiner repeats them until patient learns all of them, if possible. Number of trials: ___________    5  5  “I would like you to count backward from 100 by sevens.” (93, 86, 79, 72, 65, …) Stop after five answers.   Alternative: “Spell WORLD backwards.” ([id number])    3   3 “Earlier I told you the names of three things. Can you tell me what those were?”    2   2 Show the patient two simple objects, such as a wristwatch and a pencil, and ask the patient to name them.    1   1 “Repeat the phrase: ‘No ifs, ands, or buts.’”    3  3  “Take the paper in your right hand, fold it in half, and put it on the floor.”   (The examiner gives the patient a piece of blank paper.)    1   1 “Please read this and do what it says.” (Written instruction is “Close your eyes.”)    1   1 “Make up and write a sentence about anything.” (This sentence must contain a noun and a verb.)    1  1  “Please copy this picture.” (The examiner gives the patient a blank piece of paper and asks him/her to draw the symbol below. All 10 angles must be present and two must intersect.)             30  30  TOTAL                                                                                          Health  Risk Assessment    Smoking Status:  Social History     Tobacco Use   Smoking Status Never    Passive exposure: Never   Smokeless Tobacco Never     Alcohol Consumption:  Social History     Substance and Sexual Activity   Alcohol Use Yes    Comment: MODERATE AMT       Fall Risk Screen  STEADI Fall Risk Assessment was completed, and patient is at LOW  risk for falls.Assessment completed on:2024    Depression Screenin/12/2024     2:18 PM   PHQ-2/PHQ-9 Depression Screening   Little Interest or Pleasure in Doing Things 0-->not at all   Feeling Down, Depressed or Hopeless 0-->not at all   PHQ-9: Brief Depression Severity Measure Score 0     Health Habits and Functional and Cognitive Screenin/12/2024     2:19 PM   Functional & Cognitive Status   Do you have difficulty preparing food and eating? No   Do you have difficulty bathing yourself, getting dressed or grooming yourself? No   Do you have difficulty using the toilet? No   Do you have difficulty moving around from place to place? No   Do you have trouble with steps or getting out of a bed or a chair? No   Current Diet Well Balanced Diet   Dental Exam Up to date   Eye Exam Up to date   Exercise (times per week) 7 times per week   Current Exercises Include Walking;Light Weights   Do you need help using the phone?  No   Are you deaf or do you have serious difficulty hearing?  No   Do you need help to go to places out of walking distance? No   Do you need help shopping? No   Do you need help preparing meals?  No   Do you need help with housework?  No   Do you need help with laundry? No   Do you need help taking your medications? No   Do you need help managing money? No   Do you ever drive or ride in a car without wearing a seat belt? No   Have you felt unusual stress, anger or loneliness in the last month? No   Who do you live with? Spouse   If you need help, do you have trouble finding someone available to you? Yes   Have you been bothered in the last four weeks by sexual problems? No   Do you have difficulty concentrating, remembering or making decisions? No           Age-appropriate Screening Schedule:  Refer to the list below for future screening recommendations based on patient's age, sex and/or medical conditions. Orders for these recommended tests are listed in the plan section. The  patient has been provided with a written plan.    Health Maintenance List  Health Maintenance   Topic Date Due    DXA SCAN  Never done    ZOSTER VACCINE (1 of 2) Never done    RSV Vaccine - Adults (1 - 1-dose 60+ series) Never done    COVID-19 Vaccine (7 - 2023-24 season) 03/24/2024    INFLUENZA VACCINE  08/01/2024    LIPID PANEL  01/05/2025    ANNUAL WELLNESS VISIT  08/12/2025    COLORECTAL CANCER SCREENING  02/08/2029    TDAP/TD VACCINES (2 - Td or Tdap) 12/28/2031    HEPATITIS C SCREENING  Completed    Pneumococcal Vaccine 65+  Completed    MAMMOGRAM  Discontinued                                                                                                                                                CMS Preventative Services Quick Reference  Risk Factors Identified During Encounter  None Identified    The above risks/problems have been discussed with the patient.  Pertinent information has been shared with the patient in the After Visit Summary.  An After Visit Summary and PPPS were made available to the patient.    Follow Up:   Next Medicare Wellness visit to be scheduled in 1 year.         Additional E&M Note during same encounter follows:  Patient has additional, significant, and separately identifiable condition(s)/problem(s) that require work above and beyond the Medicare Wellness Visit     Chief Complaint  Back Pain (Pt c/o lower back pain and would like to be referred to a specialist ) and Medicare Wellness-subsequent (AWV)    Subjective   Back Pain  This is a new problem. The current episode started more than 1 month ago (This started April 1st). The problem has been worse since onset. The pain is present in the lumbar spine. The quality of the pain is described as aching. The pain radiates to the left buttock, right buttock, left thigh and right thigh. The pain is moderate. The symptoms are aggravated by sitting. Pertinent negatives include no bladder incontinence or pelvic pain. She has tried ice  "for the symptoms.         Review of Systems   Genitourinary:  Negative for pelvic pain and urinary incontinence.   Musculoskeletal:  Positive for back pain.   All other systems reviewed and are negative.             Objective   Vital Signs:  /62   Pulse 85   Resp 18   Ht 157.5 cm (62\")   Wt 57.2 kg (126 lb)   SpO2 96%   BMI 23.05 kg/m²   Physical Exam  Vitals reviewed.   Constitutional:       Appearance: She is well-developed.   HENT:      Head: Normocephalic and atraumatic.      Right Ear: Tympanic membrane, ear canal and external ear normal.      Left Ear: Tympanic membrane, ear canal and external ear normal.      Nose: Nose normal.      Mouth/Throat:      Mouth: Mucous membranes are moist.      Pharynx: Oropharynx is clear.   Eyes:      General: Lids are normal.      Conjunctiva/sclera: Conjunctivae normal.      Pupils: Pupils are equal, round, and reactive to light.   Cardiovascular:      Rate and Rhythm: Normal rate and regular rhythm.      Pulses: Normal pulses.      Heart sounds: Normal heart sounds.   Pulmonary:      Effort: Pulmonary effort is normal. No respiratory distress.      Breath sounds: Normal breath sounds.   Abdominal:      General: Abdomen is flat. Bowel sounds are normal.      Palpations: Abdomen is soft.   Musculoskeletal:         General: Normal range of motion.      Cervical back: Normal range of motion and neck supple.      Lumbar back: Tenderness present.   Skin:     General: Skin is warm and dry.      Capillary Refill: Capillary refill takes less than 2 seconds.   Neurological:      Mental Status: She is alert and oriented to person, place, and time.   Psychiatric:         Mood and Affect: Mood normal.         Behavior: Behavior normal.         Thought Content: Thought content normal.         Judgment: Judgment normal.                 Assessment and Plan               Medicare annual wellness visit, subsequent  Medicare wellness completed.  Discussed advanced directives with " patient.  Performed the Mini-Mental exam and patient scored 30/30.  Chronic bilateral low back pain with bilateral sciatica  Worsening.  Patient was given prescription for prednisone to treat her symptoms  Patient was given an order for an MRI and referred to neurosurgery    Orders Placed This Encounter   Procedures    MRI Lumbar Spine Without Contrast     Standing Status:   Future     Standing Expiration Date:   8/12/2025     Order Specific Question:   Release to patient     Answer:   Routine Release [[id number]]     Order Specific Question:   Reason for Exam:     Answer:   low back pain    Ambulatory Referral to Neurosurgery     Referral Priority:   Routine     Referral Type:   Consultation     Referral Reason:   Specialty Services Required     Referred to Provider:   Steve Warren MD     Requested Specialty:   Neurosurgery     Number of Visits Requested:   1     New Medications Ordered This Visit   Medications    predniSONE (DELTASONE) 20 MG tablet     Sig: Take 2 tablets by mouth Daily for 5 days.     Dispense:  10 tablet     Refill:  0          Follow Up   No follow-ups on file.  Patient was given instructions and counseling regarding her condition or for health maintenance advice. Please see specific information pulled into the AVS if appropriate.

## 2024-08-22 ENCOUNTER — TELEPHONE (OUTPATIENT)
Dept: FAMILY MEDICINE CLINIC | Facility: CLINIC | Age: 79
End: 2024-08-22

## 2024-08-23 DIAGNOSIS — F41.9 ANXIETY: ICD-10-CM

## 2024-08-23 RX ORDER — ALPRAZOLAM 0.5 MG/1
0.5 TABLET ORAL DAILY PRN
Qty: 45 TABLET | Refills: 1 | Status: SHIPPED | OUTPATIENT
Start: 2024-08-23

## 2024-08-23 NOTE — TELEPHONE ENCOUNTER
Rx Refill Note  Requested Prescriptions     Pending Prescriptions Disp Refills    ALPRAZolam (XANAX) 0.5 MG tablet [Pharmacy Med Name: ALPRAZolam 0.5 MG TABLET] 45 tablet      Sig: TAKE 1 TABLET BY MOUTH DAILY AS NEEDED FOR ANXIETY      Last office visit with prescribing clinician: 8/12/2024   Last telemedicine visit with prescribing clinician: Visit date not found   Next office visit with prescribing clinician: Visit date not found                         Would you like a call back once the refill request has been completed: [] Yes [] No    If the office needs to give you a call back, can they leave a voicemail: [] Yes [] No    Aleisha Ji MA  08/23/24, 10:30 EDT

## 2024-08-26 ENCOUNTER — TELEPHONE (OUTPATIENT)
Dept: ORTHOPEDIC SURGERY | Facility: CLINIC | Age: 79
End: 2024-08-26
Payer: MEDICARE

## 2024-08-26 PROBLEM — Z00.00 MEDICARE ANNUAL WELLNESS VISIT, SUBSEQUENT: Status: ACTIVE | Noted: 2024-08-26

## 2024-08-26 PROBLEM — M54.42 CHRONIC BILATERAL LOW BACK PAIN WITH BILATERAL SCIATICA: Status: ACTIVE | Noted: 2021-08-15

## 2024-08-26 NOTE — ASSESSMENT & PLAN NOTE
Worsening.  Patient was given prescription for prednisone to treat her symptoms  Patient was given an order for an MRI and referred to neurosurgery

## 2024-08-26 NOTE — TELEPHONE ENCOUNTER
Caller: TAISHA    Relationship to patient: SELF    Best call back number: [phone]    Type of visit: RIGHT KNEE SX ON 9/11/24 NEEDS TO BE CANCELLED- PCP GAVE HER STEROIDS AND IT HAS CLEARED UP HER KNEE PAIN- DOES NOT WISH TO RESCHEDULE- PLEASE CALL

## 2024-08-26 NOTE — TELEPHONE ENCOUNTER
Call returned to the patient.  Her primary care physician gave her 5 days worth of prednisone and states that her knee pain is much improved and she would like to postpone her surgery.  Have left it open for her to call when she is ready to reschedule

## 2024-08-27 DIAGNOSIS — M54.42 CHRONIC BILATERAL LOW BACK PAIN WITH BILATERAL SCIATICA: ICD-10-CM

## 2024-08-27 DIAGNOSIS — G89.29 CHRONIC BILATERAL LOW BACK PAIN WITH BILATERAL SCIATICA: ICD-10-CM

## 2024-08-27 DIAGNOSIS — M54.41 CHRONIC BILATERAL LOW BACK PAIN WITH BILATERAL SCIATICA: ICD-10-CM

## 2024-08-28 NOTE — TELEPHONE ENCOUNTER
Rx Refill Note  Requested Prescriptions     Pending Prescriptions Disp Refills    predniSONE (DELTASONE) 20 MG tablet [Pharmacy Med Name: predniSONE 20 MG TABLET] 10 tablet 0     Sig: TAKE 2 TABLETS BY MOUTH DAILY FOR 5 DAYS      Last office visit with prescribing clinician: 8/12/2024   Last telemedicine visit with prescribing clinician: Visit date not found   Next office visit with prescribing clinician: Visit date not found                         Would you like a call back once the refill request has been completed: [] Yes [] No    If the office needs to give you a call back, can they leave a voicemail: [] Yes [] No    Aleisha Ji MA  08/28/24, 14:17 EDT

## 2024-08-29 RX ORDER — PREDNISONE 20 MG/1
40 TABLET ORAL DAILY
Qty: 10 TABLET | Refills: 0 | OUTPATIENT
Start: 2024-08-29 | End: 2024-09-03

## 2024-08-30 ENCOUNTER — TELEPHONE (OUTPATIENT)
Dept: FAMILY MEDICINE CLINIC | Facility: CLINIC | Age: 79
End: 2024-08-30

## 2024-08-30 NOTE — TELEPHONE ENCOUNTER
Caller: Lou Shaffer    Relationship: Self    Best call back number: [phone]     What medication are you requesting: DAILY PREDNISONE TO HELP WITH LEG PAIN    If a prescription is needed, what is your preferred pharmacy and phone number: [facility] [id number] - [place], [place] - [address] [facility] [address] [facility] [phone] [facility] [phone]      Additional notes:  PATIENT IS WILLING TO COME IN FOR AN APPOINTMENT IF NECESSARY.    PATIENT STATED THAT THIS MEDICATION REALLY HELPED WITH HER JOINT PAIN.    PLEASE CALL BACK TO ADVISE.

## 2024-09-04 ENCOUNTER — HOSPITAL ENCOUNTER (OUTPATIENT)
Dept: MRI IMAGING | Facility: HOSPITAL | Age: 79
Discharge: HOME | End: 2024-09-04
Admitting: FAMILY MEDICINE
Payer: MEDICARE

## 2024-09-04 DIAGNOSIS — M54.41 CHRONIC BILATERAL LOW BACK PAIN WITH BILATERAL SCIATICA: ICD-10-CM

## 2024-09-04 DIAGNOSIS — G89.29 CHRONIC BILATERAL LOW BACK PAIN WITH BILATERAL SCIATICA: ICD-10-CM

## 2024-09-04 DIAGNOSIS — M54.42 CHRONIC BILATERAL LOW BACK PAIN WITH BILATERAL SCIATICA: ICD-10-CM

## 2024-09-04 PROCEDURE — 72148 MRI LUMBAR SPINE W/O DYE: CPT

## 2024-09-12 ENCOUNTER — OFFICE VISIT (OUTPATIENT)
Dept: FAMILY MEDICINE CLINIC | Facility: CLINIC | Age: 79
End: 2024-09-12
Payer: MEDICARE

## 2024-09-12 VITALS
OXYGEN SATURATION: 97 % | WEIGHT: 127.5 LBS | HEIGHT: 62 IN | SYSTOLIC BLOOD PRESSURE: 110 MMHG | RESPIRATION RATE: 18 BRPM | HEART RATE: 90 BPM | BODY MASS INDEX: 23.46 KG/M2 | DIASTOLIC BLOOD PRESSURE: 82 MMHG

## 2024-09-12 DIAGNOSIS — M54.41 CHRONIC BILATERAL LOW BACK PAIN WITH BILATERAL SCIATICA: Primary | ICD-10-CM

## 2024-09-12 DIAGNOSIS — M54.42 CHRONIC BILATERAL LOW BACK PAIN WITH BILATERAL SCIATICA: Primary | ICD-10-CM

## 2024-09-12 DIAGNOSIS — G89.29 CHRONIC BILATERAL LOW BACK PAIN WITH BILATERAL SCIATICA: Primary | ICD-10-CM

## 2024-09-12 DIAGNOSIS — M17.11 OSTEOARTHRITIS OF RIGHT KNEE, UNSPECIFIED OSTEOARTHRITIS TYPE: ICD-10-CM

## 2024-09-12 DIAGNOSIS — R42 DIZZINESS: ICD-10-CM

## 2024-09-12 PROCEDURE — 1126F AMNT PAIN NOTED NONE PRSNT: CPT | Performed by: FAMILY MEDICINE

## 2024-09-12 PROCEDURE — 99214 OFFICE O/P EST MOD 30 MIN: CPT | Performed by: FAMILY MEDICINE

## 2024-09-12 RX ORDER — PREDNISONE 20 MG/1
40 TABLET ORAL DAILY
Qty: 12 TABLET | Refills: 0 | Status: SHIPPED | OUTPATIENT
Start: 2024-09-12 | End: 2024-09-18

## 2024-09-12 RX ORDER — MECLIZINE HYDROCHLORIDE 25 MG/1
25 TABLET ORAL 4 TIMES DAILY
Qty: 40 TABLET | Refills: 1 | Status: SHIPPED | OUTPATIENT
Start: 2024-09-12

## 2024-09-12 NOTE — TELEPHONE ENCOUNTER
Rx Refill Note  Requested Prescriptions     Pending Prescriptions Disp Refills    Diclofenac Sodium (VOLTAREN) 1 % gel gel 100 g 1     Sig: Apply  topically to the appropriate area as directed 4 (Four) Times a Day.      Last office visit with prescribing clinician: 8/12/2024   Last telemedicine visit with prescribing clinician: Visit date not found   Next office visit with prescribing clinician: 9/12/2024                         Would you like a call back once the refill request has been completed: [] Yes [] No    If the office needs to give you a call back, can they leave a voicemail: [] Yes [] No    Aleisha Ji MA  09/12/24, 10:24 EDT

## 2024-09-16 ENCOUNTER — TELEPHONE (OUTPATIENT)
Dept: ORTHOPEDIC SURGERY | Facility: CLINIC | Age: 79
End: 2024-09-16
Payer: MEDICARE

## 2024-09-27 RX ORDER — BENAZEPRIL HYDROCHLORIDE 5 MG/1
5 TABLET ORAL
Qty: 90 TABLET | Refills: 3 | Status: SHIPPED | OUTPATIENT
Start: 2024-09-27

## 2024-09-28 PROBLEM — R42 DIZZINESS: Status: ACTIVE | Noted: 2024-09-28

## 2024-09-30 ENCOUNTER — TELEPHONE (OUTPATIENT)
Dept: ORTHOPEDIC SURGERY | Facility: CLINIC | Age: 79
End: 2024-09-30
Payer: MEDICARE

## 2024-09-30 NOTE — TELEPHONE ENCOUNTER
HUB AGENT ATTEMPTED CLINICAL WARM TRANSFER - NO ANSWER     PATIENT SCHEDULED FOR RIGHT TOTAL KNEE REPLACEMENT SURGERY BY DR URBAN 11-08-24 & PATIENT WANTS TO GET AN INJECTION IN HER BACK DUE TO PAIN     PLEASE CALL / LEAVE VMAIL PATIENT CELL [phone] TO DISCUSS     THANKS

## 2024-09-30 NOTE — TELEPHONE ENCOUNTER
"Relay     \"As long as her back injection is done at least 2 weeks prior to her knee surgery that should be fine. \"                 "

## 2024-09-30 NOTE — TELEPHONE ENCOUNTER
Call returned to the patient.  Have advised her that Dr. Montez is okay with her having the injection in her back however it needs to be done at least 2 weeks prior to her knee surgery.  Patient understands and agrees

## 2024-10-21 ENCOUNTER — PREP FOR SURGERY (OUTPATIENT)
Dept: OTHER | Facility: HOSPITAL | Age: 79
End: 2024-10-21
Payer: MEDICARE

## 2024-10-21 DIAGNOSIS — M17.11 PRIMARY OSTEOARTHRITIS OF RIGHT KNEE: Primary | ICD-10-CM

## 2024-10-28 ENCOUNTER — PRE-ADMISSION TESTING (OUTPATIENT)
Dept: PREADMISSION TESTING | Facility: HOSPITAL | Age: 79
End: 2024-10-28
Payer: MEDICARE

## 2024-10-28 VITALS
WEIGHT: 128 LBS | TEMPERATURE: 97 F | HEART RATE: 71 BPM | BODY MASS INDEX: 25.13 KG/M2 | DIASTOLIC BLOOD PRESSURE: 92 MMHG | SYSTOLIC BLOOD PRESSURE: 138 MMHG | RESPIRATION RATE: 18 BRPM | OXYGEN SATURATION: 96 % | HEIGHT: 60 IN

## 2024-10-28 DIAGNOSIS — M17.11 PRIMARY OSTEOARTHRITIS OF RIGHT KNEE: ICD-10-CM

## 2024-10-28 LAB
ABO GROUP BLD: NORMAL
ANION GAP SERPL CALCULATED.3IONS-SCNC: 8.4 MMOL/L (ref 5–15)
BLD GP AB SCN SERPL QL: NEGATIVE
BUN SERPL-MCNC: 17 MG/DL (ref 8–23)
BUN/CREAT SERPL: 28.3 (ref 7–25)
CALCIUM SPEC-SCNC: 9.1 MG/DL (ref 8.6–10.5)
CHLORIDE SERPL-SCNC: 102 MMOL/L (ref 98–107)
CO2 SERPL-SCNC: 24.6 MMOL/L (ref 22–29)
CREAT SERPL-MCNC: 0.6 MG/DL (ref 0.57–1)
DEPRECATED RDW RBC AUTO: 45 FL (ref 37–54)
EGFRCR SERPLBLD CKD-EPI 2021: 90.9 ML/MIN/1.73
ERYTHROCYTE [DISTWIDTH] IN BLOOD BY AUTOMATED COUNT: 13.9 % (ref 12.3–15.4)
GLUCOSE SERPL-MCNC: 82 MG/DL (ref 65–99)
HBA1C MFR BLD: 5.5 % (ref 4.8–5.6)
HCT VFR BLD AUTO: 35.4 % (ref 34–46.6)
HGB BLD-MCNC: 11.1 G/DL (ref 12–15.9)
MCH RBC QN AUTO: 28 PG (ref 26.6–33)
MCHC RBC AUTO-ENTMCNC: 31.4 G/DL (ref 31.5–35.7)
MCV RBC AUTO: 89.2 FL (ref 79–97)
PLATELET # BLD AUTO: 301 10*3/MM3 (ref 140–450)
PMV BLD AUTO: 9.7 FL (ref 6–12)
POTASSIUM SERPL-SCNC: 4.7 MMOL/L (ref 3.5–5.2)
QT INTERVAL: 401 MS
QTC INTERVAL: 424 MS
RBC # BLD AUTO: 3.97 10*6/MM3 (ref 3.77–5.28)
RH BLD: POSITIVE
SODIUM SERPL-SCNC: 135 MMOL/L (ref 136–145)
T&S EXPIRATION DATE: NORMAL
WBC NRBC COR # BLD AUTO: 6.45 10*3/MM3 (ref 3.4–10.8)

## 2024-10-28 PROCEDURE — 36415 COLL VENOUS BLD VENIPUNCTURE: CPT

## 2024-10-28 PROCEDURE — 86850 RBC ANTIBODY SCREEN: CPT

## 2024-10-28 PROCEDURE — 93005 ELECTROCARDIOGRAM TRACING: CPT

## 2024-10-28 PROCEDURE — 85027 COMPLETE CBC AUTOMATED: CPT

## 2024-10-28 PROCEDURE — 86900 BLOOD TYPING SEROLOGIC ABO: CPT

## 2024-10-28 PROCEDURE — 83036 HEMOGLOBIN GLYCOSYLATED A1C: CPT

## 2024-10-28 PROCEDURE — 80048 BASIC METABOLIC PNL TOTAL CA: CPT

## 2024-10-28 PROCEDURE — 86901 BLOOD TYPING SEROLOGIC RH(D): CPT

## 2024-10-28 RX ORDER — ACETAMINOPHEN 500 MG
500 TABLET ORAL EVERY 6 HOURS PRN
COMMUNITY

## 2024-10-28 NOTE — DISCHARGE INSTRUCTIONS
Take the following medications the morning of surgery:      LEVOTHYROXINE AND PANTOPRAZOLE    If you are on prescription narcotic pain medication to control your pain you may also take that medication the morning of surgery.      General Instructions:     Do not eat solid food after midnight the night before surgery.  Clear liquids day of surgery are allowed but must be stopped at least two hours before your hospital arrival time.       Allowed clear liquids      Water, sodas, and tea or coffee with no cream or milk added.       12 to 20 ounces of a clear liquid that contains carbohydrates is recommended.  If non-diabetic, have Gatorade or Powerade.  If diabetic, have G2 or Powerade Zero.     Do not have liquids red in color.  Do not consume chicken, beef, pork or vegetable broth or bouillon cubes of any variety as they are not considered clear liquids and are not allowed.      Infants may have breast milk up to four hours before surgery.  Infants drinking formula may drink formula up to six hours before surgery.   Patients who avoid smoking, chewing tobacco and alcohol for 4 weeks prior to surgery have a reduced risk of post-operative complications.  Quit smoking as many days before surgery as you can.  Do not smoke, use chewing tobacco or drink alcohol the day of surgery.   If applicable bring your C-PAP/ BI-PAP machine in with you to preop day of surgery.  Bring any papers given to you in the doctor’s office.  Wear clean comfortable clothes.  Do not wear contact lenses, false eyelashes or make-up.  Bring a case for your glasses.   Bring crutches or walker if applicable.  Remove all piercings.  Leave jewelry and any other valuables at home.  Hair extensions with metal clips must be removed prior to surgery.  The Pre-Admission Testing nurse will instruct you to bring medications if unable to obtain an accurate list in Pre-Admission Testing.        If you were given a blood bank ID arm band remember to bring it with  you the day of surgery.    Preventing a Surgical Site Infection:  For 2 to 3 days before surgery, avoid shaving with a razor because the razor can irritate skin and make it easier to develop an infection.    Any areas of open skin can increase the risk of a post-operative wound infection by allowing bacteria to enter and travel throughout the body.  Notify your surgeon if you have any skin wounds / rashes even if it is not near the expected surgical site.  The area will need assessed to determine if surgery should be delayed until it is healed.  The night prior to surgery shower using a fresh bar of anti-bacterial soap (such as Dial) and clean washcloth.  Sleep in a clean bed with clean clothing.  Do not allow pets to sleep with you.  Shower on the morning of surgery using a fresh bar of anti-bacterial soap (such as Dial) and clean washcloth.  Dry with a clean towel and dress in clean clothing.  Ask your surgeon if you will be receiving antibiotics prior to surgery.  Make sure you, your family, and all healthcare providers clean their hands with soap and water or an alcohol based hand  before caring for you or your wound.    Day of surgery:  Your arrival time is approximately two hours before your scheduled surgery time.  Please note if you have an early arrival time the surgery doors do not open before 5:00 AM.  Upon arrival, a Pre-op nurse and Anesthesiologist will review your health history, obtain vital signs, and answer questions you may have.  The only belongings needed at this time will be a list of your home medications and if applicable your C-PAP/BI-PAP machine.  A Pre-op nurse will start an IV and you may receive medication in preparation for surgery, including something to help you relax.     Please be aware that surgery does come with discomfort.  We want to make every effort to control your discomfort so please discuss any uncontrolled symptoms with your nurse.   Your doctor will most likely  have prescribed pain medications.      If you are going home after surgery you will receive individualized written care instructions before being discharged.  A responsible adult must drive you to and from the hospital on the day of your surgery and ideally stay with you through the night.   .  Discharge prescriptions can be filled by the hospital pharmacy during regular pharmacy hours.  If you are having surgery late in the day/evening your prescription may be e-prescribed to your pharmacy.  Please verify your pharmacy hours or chose a 24 hour pharmacy to avoid not having access to your prescription because your pharmacy has closed for the day.    If you are staying overnight following surgery, you will be transported to your hospital room following the recovery period.  [facility] has all private rooms.    If you have any questions please call Pre-Admission Testing at [phone].  Deductibles and co-payments are collected on the day of service. Please be prepared to pay the required co-pay, deductible or deposit on the day of service as defined by your plan.    Call your surgeon immediately if you experience any of the following symptoms:  Sore Throat  Shortness of Breath or difficulty breathing  Cough  Chills  Body soreness or muscle pain  Headache  Fever  New loss of taste or smell  Do not arrive for your surgery ill.  Your procedure will need to be rescheduled to another time.  You will need to call your physician before the day of surgery to avoid any unnecessary exposure to hospital staff as well as other patients.  CHLORHEXIDINE CLOTH INSTRUCTIONS  The morning of surgery follow these instructions using the Chlorhexidine cloths you've been given.  These steps reduce bacteria on the body.  Do not use the cloths near your eyes, ears mouth, genitalia or on open wounds.  Throw the cloths away after use but do not try to flush them down a toilet.      Open and remove one cloth at a time from the  package.    Leave the cloth unfolded and begin the bathing.  Massage the skin with the cloths using gentle pressure to remove bacteria.  Do not scrub harshly.   Follow the steps below with one 2% CHG cloth per area (6 total cloths).  One cloth for neck, shoulders and chest.  One cloth for both arms, hands, fingers and underarms (do underarms last).  One cloth for the abdomen followed by groin.  One cloth for right leg and foot including between the toes.  One cloth for left leg and foot including between the toes.  The last cloth is to be used for the back of the neck, back and buttocks.    Allow the CHG to air dry 3 minutes on the skin which will give it time to work and decrease the chance of irritation.  The skin may feel sticky until it is dry.  Do not rinse with water or any other liquid or you will lose the beneficial effects of the CHG.  If mild skin irritation occurs, do rinse the skin to remove the CHG.  Report this to the nurse at time of admission.  Do not apply lotions, creams, ointments, deodorants or perfumes after using the clothes. Dress in clean clothes before coming to the hospital.

## 2024-11-01 ENCOUNTER — OFFICE VISIT (OUTPATIENT)
Dept: ORTHOPEDIC SURGERY | Facility: CLINIC | Age: 79
End: 2024-11-01
Payer: MEDICARE

## 2024-11-01 VITALS — WEIGHT: 127.8 LBS | BODY MASS INDEX: 24.13 KG/M2 | TEMPERATURE: 97.4 F | HEIGHT: 61 IN

## 2024-11-01 DIAGNOSIS — M17.11 PRIMARY OSTEOARTHRITIS OF RIGHT KNEE: Primary | ICD-10-CM

## 2024-11-01 DIAGNOSIS — R52 PAIN: ICD-10-CM

## 2024-11-01 NOTE — PROGRESS NOTES
Patient: Lou Shaffer        YOB: 1945    Medical Record Number: [id number]    Admitting Physician: Dr. Steve Montez    Reason for Admission: End Stage Right Knee OA    History of Present Illness: 79 y.o. female presents with severe end stage knee osteoarthritis which has not been responsive to the full compliment of conservative measures. Despite conservative attempts, there is still severe, constant activity limiting pain. Given the severity of the pain, the patient has elected to proceed with knee replacement.    Allergies:   Allergies   Allergen Reactions    Meperidine Nausea And Vomiting         Current Medications:  Home Medications:    Current Outpatient Medications on File Prior to Visit   Medication Sig    acetaminophen (TYLENOL) 500 MG tablet Take 1 tablet by mouth Every 6 (Six) Hours As Needed for Mild Pain.    ALPRAZolam (XANAX) 0.5 MG tablet TAKE 1 TABLET BY MOUTH DAILY AS NEEDED FOR ANXIETY    baclofen (LIORESAL) 10 MG tablet Take 1 tablet by mouth 2 (Two) Times a Day. (Patient taking differently: Take 1 tablet by mouth Every Night.)    benazepril (LOTENSIN) 5 MG tablet TAKE 1 TABLET BY MOUTH AT NIGHT (Patient taking differently: Take 1 tablet by mouth every night at bedtime. HOLD NIGHT BEFORE SURGERY ONLY)    Budesonide (ENTOCORT EC) 3 MG 24 hr capsule Take 1 capsule by mouth Daily.    Cholecalciferol (VITAMIN D-3) 1000 UNITS capsule Take 1,000 Units by mouth Daily. TO HOLD 1 WEEK BEFORE SURGERY    fenofibrate 160 MG tablet Take 1 tablet by mouth Daily.    HYDROcodone-acetaminophen (NORCO) 7.5-325 MG per tablet Take 0.5 tablets by mouth Every 8 (Eight) Hours As Needed.    levothyroxine (SYNTHROID, LEVOTHROID) 75 MCG tablet Take 1 tablet by mouth Daily.    magnesium oxide (MAG-OX) 400 MG tablet Take 1 tablet by mouth 2 (two) times a day. TO HOLD 1 WEEK BEFORE SURGERY    meclizine (ANTIVERT) 25 MG tablet Take 1 tablet by mouth 4 (Four) Times a Day. (Patient taking differently:  Take 1 tablet by mouth 3 (Three) Times a Day As Needed.)    pantoprazole (PROTONIX) 40 MG EC tablet Take 1 tablet by mouth 2 (Two) Times a Day.    nitrofurantoin, macrocrystal-monohydrate, (MACROBID) 100 MG capsule Take 1 capsule by mouth 2 (Two) Times a Day for 7 days. (Patient not taking: Reported on 11/1/2024)     No current facility-administered medications on file prior to visit.     PRN Meds:.    PMH:     Past Medical History:   Diagnosis Date    Acne     Adnexal mass 2002    RIGHT, S/P JAZMIN BSO    Anxiety     Breast cancer     Chronic headaches 01/04/2016    MRI OF BRAIN AT [facility] SHOWED PARTIAL OPACIFICATIONS OF THE FRONTAL ETHMOID AND MAXILLARY SINUSES AS WELL AS LEFT SPHENOID SINUS WITH FLUID AND MUCOSAL THICKENING. MINIMAL SMALL VESSEL DISEASE IN THE CEREBRAL WHITE MATTER. THE REMAINDER IS WNL, NO SIGNIFICANT CHANGE WHEN COMPARED TO PRIOR MRI ON 1/20/2009.    Chronic low back pain     Collagenous colitis 07/2019    DDD (degenerative disc disease), cervical 2005    Diarrhea 01/2019    Disease of thyroid gland     HYPOTHYROIDISM    Dizziness 06/11/2018    SEEN AT [facility] ER    Dry skin     Dysuria 11/2019    Erosive gastritis with hemorrhage     Fall 03/10/2014    CONTUSION OF COCCYX, SEEN AT [facility] ER    GERD (gastroesophageal reflux disease)     Glaucoma     H/O hemorrhoids     History of strabismus     Hormone replacement therapy (HRT)     Hot flashes     Hyperlipidemia     Hypertension     Hypertensive emergency 01/11/2006    SEEN AT [facility] ER    Insomnia     Iron deficiency anemia     Leukopenia 11/2019    Night sweats 01/2018    Positive fecal occult blood test 01/19/2018    Post-menopausal     PUD (peptic ulcer disease)     Rectal bleeding 07/2019    Right knee pain     Scar of nose     Trigger thumb of both hands 04/2018    UTI (urinary tract infection)     ON ANTIBIOTICS INSTRUCTED PT TO NOTIFY DR URBAN    Vitamin D deficiency        PF/Surg/Soc Hx:     Past Surgical History:   Procedure Laterality Date     COLONOSCOPY N/A 05/27/2010    NON BLEEDING INTERNAL HEMORRHOIDS, BX SHOWED COLLAGENOUS COLITIS, RESCOPE IN 5 YRS, DR. PEREZ    COLONOSCOPY N/A 08/23/2004    WNL, RESCOPE IN 5 YRS, DR. CM HASTINGS AT [facility]    COLONOSCOPY N/A 02/08/2019    ENTIRE COLON WNL, RANDOM BX: COLLAGENOUS COLITIS, DR. ROSEANN PEREZ AT [place]    COLONOSCOPY N/A 02/26/2018    DIVERTICULOSIS THROUGHOUT SIGMOID, RESCOPE IN 5 YRS, DR. TAISHA MANNING AT [facility]    ENDOSCOPY N/A 05/27/2010    EROSIVE GASTROPATHY, BX SHOWED SCATTERED HELIOBACTER LIKE ORGANISMS, DR. PEREZ AT [facility]    EYE SURGERY Right     CORNEAL TRANSPLANT    EYE SURGERY      STRABISMUS REPAIR BY RESECTION    EYE SURGERY Right 02/23/2016    GLAUCOMA SURGERY, DR. RITA THORNE AT [place]    HYSTERECTOMY N/A 02/25/2002    [facility] BSO, DR. MICKIE MCFADDEN AT [facility]    LUMBAR DISCECTOMY ANTERIOR WITH ARTIFICIAL DISC IMPLANTATION N/A 1998    L5-S1 DISCECTOMY, Dr. Malik    OOPHORECTOMY Bilateral 02/25/2002    [facility] BSO, DR. MICKIE MCFADDEN AT [facility]    TONSILLECTOMY AND ADENOIDECTOMY Bilateral         Social History     Occupational History    Not on file   Tobacco Use    Smoking status: Never     Passive exposure: Never    Smokeless tobacco: Never   Vaping Use    Vaping status: Never Used   Substance and Sexual Activity    Alcohol use: Yes     Comment: 1 BOURBON DAILY MOST DAYS    Drug use: No    Sexual activity: Yes     Partners: Male      Social History     Social History Narrative    Not on file        Family History   Problem Relation Age of Onset    Pneumonia Mother     COPD Father     Coronary artery disease Sister     Breast cancer Sister     Cancer Sister     Lung cancer Sister     Cancer Sister     Stroke Brother         CVA    Heart disease Brother     Heart attack Brother     Ovarian cancer Neg Hx     Colon cancer Neg Hx     Colon polyps Neg Hx     Crohn's disease Neg Hx     Irritable bowel syndrome Neg Hx     Ulcerative colitis Neg Hx     Malig Hyperthermia Neg Hx          Review of  "Systems:   A 14 point review of systems was performed, pertinent positives discussed above, all other systems are negative    Physical Exam: 79 y.o. female  Vital Signs :   Vitals:    11/01/24 0932   Temp: 97.4 °F (36.3 °C)   TempSrc: Temporal   Weight: 58 kg (127 lb 12.8 oz)   Height: 154.9 cm (61\")   PainSc:   2   PainLoc: Knee     General: Alert and Oriented x 3, No acute distress.  Psych: mood and affect appropriate; recent and remote memory intact  Eyes: conjunctiva clear; pupils equally round and reactive, sclera nonicteric  CV: no peripheral edema  Resp: normal respiratory effort  Skin: no rashes or wounds; normal turgor  Musculosketetal; pain and crepitance with knee range of motion  Vascular: palpable distal pulses      ROM: 2-112    Xrays:  -4 views (AP, lateral, and sunrise) were reviewed demonstrating end-stage OA with bone on bone articulation.  -A full length AP xray was ordered today for purposes of operative alignment demonstrating end stage arthritic findings. There are no previous full length films for review    Assessment:  End-stage Right knee osteoarthritis. Conservative measures have failed.      Plan:  The plan is to proceed with  Total Knee Replacement. The patient voiced understanding of the risks, benefits, and alternative forms of treatment that were discussed with Dr Montez at the time of scheduling.  Patient had recent UTI finished her antibiotics yesterday.  She is symptom-free.  Labs EKG reviewed.  She states she will do her joint class.     would like to proceed with outpatient if able.    Steve Montez MD  11/1/2024         "

## 2024-11-03 PROCEDURE — 27447 TOTAL KNEE ARTHROPLASTY: CPT | Performed by: SPECIALIST/TECHNOLOGIST, OTHER

## 2024-11-03 PROCEDURE — S0260 H&P FOR SURGERY: HCPCS | Performed by: ORTHOPAEDIC SURGERY

## 2024-11-03 NOTE — H&P
Patient: Lou Shaffer        YOB: 1945    Medical Record Number: [id number]    Admitting Physician: Dr. Steve Montez    Reason for Admission: End Stage Right Knee OA    History of Present Illness: 79 y.o. female presents with severe end stage knee osteoarthritis which has not been responsive to the full compliment of conservative measures. Despite conservative attempts, there is still severe, constant activity limiting pain. Given the severity of the pain, the patient has elected to proceed with knee replacement.    Allergies:   Allergies   Allergen Reactions    Meperidine Nausea And Vomiting         Current Medications:  Home Medications:    No current facility-administered medications on file prior to encounter.     Current Outpatient Medications on File Prior to Encounter   Medication Sig    baclofen (LIORESAL) 10 MG tablet Take 1 tablet by mouth 2 (Two) Times a Day. (Patient taking differently: Take 1 tablet by mouth Every Night.)    Budesonide (ENTOCORT EC) 3 MG 24 hr capsule Take 1 capsule by mouth Daily.    Cholecalciferol (VITAMIN D-3) 1000 UNITS capsule Take 1,000 Units by mouth Daily. TO HOLD 1 WEEK BEFORE SURGERY    fenofibrate 160 MG tablet Take 1 tablet by mouth Daily.    HYDROcodone-acetaminophen (NORCO) 7.5-325 MG per tablet Take 0.5 tablets by mouth Every 8 (Eight) Hours As Needed.    levothyroxine (SYNTHROID, LEVOTHROID) 75 MCG tablet Take 1 tablet by mouth Daily.    magnesium oxide (MAG-OX) 400 MG tablet Take 1 tablet by mouth 2 (two) times a day. TO HOLD 1 WEEK BEFORE SURGERY    pantoprazole (PROTONIX) 40 MG EC tablet Take 1 tablet by mouth 2 (Two) Times a Day.     PRN Meds:.    PMH:     Past Medical History:   Diagnosis Date    Acne     Adnexal mass 2002    RIGHT, S/P JAZMIN BSO    Anxiety     Breast cancer     Chronic headaches 01/04/2016    MRI OF BRAIN AT [facility] SHOWED PARTIAL OPACIFICATIONS OF THE FRONTAL ETHMOID AND MAXILLARY SINUSES AS WELL AS LEFT SPHENOID SINUS WITH  FLUID AND MUCOSAL THICKENING. MINIMAL SMALL VESSEL DISEASE IN THE CEREBRAL WHITE MATTER. THE REMAINDER IS WNL, NO SIGNIFICANT CHANGE WHEN COMPARED TO PRIOR MRI ON 1/20/2009.    Chronic low back pain     Collagenous colitis 07/2019    DDD (degenerative disc disease), cervical 2005    Diarrhea 01/2019    Disease of thyroid gland     HYPOTHYROIDISM    Dizziness 06/11/2018    SEEN AT [facility] ER    Dry skin     Dysuria 11/2019    Erosive gastritis with hemorrhage     Fall 03/10/2014    CONTUSION OF COCCYX, SEEN AT [facility] ER    GERD (gastroesophageal reflux disease)     Glaucoma     H/O hemorrhoids     History of strabismus     Hormone replacement therapy (HRT)     Hot flashes     Hyperlipidemia     Hypertension     Hypertensive emergency 01/11/2006    SEEN AT [facility] ER    Insomnia     Iron deficiency anemia     Leukopenia 11/2019    Night sweats 01/2018    Positive fecal occult blood test 01/19/2018    Post-menopausal     PUD (peptic ulcer disease)     Rectal bleeding 07/2019    Right knee pain     Scar of nose     Trigger thumb of both hands 04/2018    UTI (urinary tract infection)     ON ANTIBIOTICS INSTRUCTED PT TO NOTIFY DR URBAN    Vitamin D deficiency        PF/Surg/Soc Hx:     Past Surgical History:   Procedure Laterality Date    COLONOSCOPY N/A 05/27/2010    NON BLEEDING INTERNAL HEMORRHOIDS, BX SHOWED COLLAGENOUS COLITIS, RESCOPE IN 5 YRS, DR. PEREZ    COLONOSCOPY N/A 08/23/2004    WNL, RESCOPE IN 5 YRS, DR. CM HASTINGS AT [facility]    COLONOSCOPY N/A 02/08/2019    ENTIRE COLON WNL, RANDOM BX: COLLAGENOUS COLITIS, DR. ROSEANN PEREZ AT [place]    COLONOSCOPY N/A 02/26/2018    DIVERTICULOSIS THROUGHOUT SIGMOID, RESCOPE IN 5 YRS, DR. TAISHA MANNING AT [facility]    ENDOSCOPY N/A 05/27/2010    EROSIVE GASTROPATHY, BX SHOWED SCATTERED HELIOBACTER LIKE ORGANISMS, DR. PEREZ AT [facility]    EYE SURGERY Right     CORNEAL TRANSPLANT    EYE SURGERY      STRABISMUS REPAIR BY RESECTION    EYE SURGERY Right 02/23/2016    GLAUCOMA  SURGERY, DR. RITA THORNE AT [place]    HYSTERECTOMY N/A 02/25/2002    [facility] BSO, DR. MICKIE MCFADDEN AT [facility]    LUMBAR DISCECTOMY ANTERIOR WITH ARTIFICIAL DISC IMPLANTATION N/A 1998    L5-S1 DISCECTOMY, Dr. Malik    OOPHORECTOMY Bilateral 02/25/2002    [facility] BSO, DR. MICKIE MCFADDEN AT [facility]    TONSILLECTOMY AND ADENOIDECTOMY Bilateral         Social History     Occupational History    Not on file   Tobacco Use    Smoking status: Never     Passive exposure: Never    Smokeless tobacco: Never   Vaping Use    Vaping status: Never Used   Substance and Sexual Activity    Alcohol use: Yes     Comment: 1 BOURBON DAILY MOST DAYS    Drug use: No    Sexual activity: Yes     Partners: Male      Social History     Social History Narrative    Not on file        Family History   Problem Relation Age of Onset    Pneumonia Mother     COPD Father     Coronary artery disease Sister     Breast cancer Sister     Cancer Sister     Lung cancer Sister     Cancer Sister     Stroke Brother         CVA    Heart disease Brother     Heart attack Brother     Ovarian cancer Neg Hx     Colon cancer Neg Hx     Colon polyps Neg Hx     Crohn's disease Neg Hx     Irritable bowel syndrome Neg Hx     Ulcerative colitis Neg Hx     Malig Hyperthermia Neg Hx          Review of Systems:   A 14 point review of systems was performed, pertinent positives discussed above, all other systems are negative    Physical Exam: 79 y.o. female  Vital Signs :   There were no vitals filed for this visit.    General: Alert and Oriented x 3, No acute distress.  Psych: mood and affect appropriate; recent and remote memory intact  Eyes: conjunctiva clear; pupils equally round and reactive, sclera nonicteric  CV: no peripheral edema  Resp: normal respiratory effort  Skin: no rashes or wounds; normal turgor  Musculosketetal; pain and crepitance with knee range of motion  Vascular: palpable distal pulses      ROM: 2-112    Xrays:  -4 views (AP, lateral, and sunrise) were reviewed  demonstrating end-stage OA with bone on bone articulation.  -A full length AP xray was ordered today for purposes of operative alignment demonstrating end stage arthritic findings. There are no previous full length films for review    Assessment:  End-stage Right knee osteoarthritis. Conservative measures have failed.      Plan:  The plan is to proceed with  Total Knee Replacement. The patient voiced understanding of the risks, benefits, and alternative forms of treatment that were discussed with Dr Montez at the time of scheduling.  Patient had recent UTI finished her antibiotics yesterday.  She is symptom-free.  Labs EKG reviewed.  She states she will do her joint class.     would like to proceed with outpatient if able.    Steve Montez MD  11/3/2024      This note was copied pasted from most recent office visit.  Interval addendum may be made to update as needed.    Steve Montez MD

## 2024-11-04 ENCOUNTER — TELEPHONE (OUTPATIENT)
Dept: ORTHOPEDIC SURGERY | Facility: HOSPITAL | Age: 79
End: 2024-11-04
Payer: MEDICARE

## 2024-11-04 RX ORDER — PANTOPRAZOLE SODIUM 40 MG/1
40 TABLET, DELAYED RELEASE ORAL 2 TIMES DAILY
Qty: 180 TABLET | Refills: 3 | Status: SHIPPED | OUTPATIENT
Start: 2024-11-04

## 2024-11-04 NOTE — TELEPHONE ENCOUNTER
Risk Factor yes no   Age >75 X    BMI <20 >40  X   Patient History     Chronic Pain (2 or more meds/Pain Management)  X   ETOH (more than 3 drinks Daily)  X   Uncontrolled Depression/Bipolar/Schizoaffective Disorder  X   Arrhythmias--  X   Stent placement/MI  X   DVT/PE  X   Pacemaker     HTN (uncontrolled or requiring more than 2 medications)  X   CHF/Retained fluids/Edema  X   Stroke with Residual   X   COPD/Asthma  X   MAMADOU--Non-compliant with CPAP  X   Diabetes (on insulin or more than 2 meds)         A1C:5.5  X   BPH/Urinary retention (on medication)     CKD  X   Home environment and support     Current ambulation status (use of cane, walker, W/C, Multiple falls/weakness)  X   Stairs to enter and throughout home X    Lives Alone  X   Doesn't have support at home  X   Outpatient Screening Assessment    Home needs: (Walker/BSC):  Needs walker  ? Steps BR upstairs, but she has set up a room on the main level for use for a few days.   Caregiver 24-48hrs post-discharge:      Discharge Plan:    PT    Prescriptions: Meds to bed    Home medications:   [] Blood thinner/anti-coag therapy--   [] BPH or diuretic--  ? BP meds-- Benazepril  ? Pain/Anti-inflammatories--Norco  Pre-op Education:  Educate patient on spinal anesthesia/pain control:  ? patient verbalize understanding    Educate patient on hospital course/timeline:  ?  patient verbalize understanding    Joint Care Class:  []  yes ? no  Have resent the link for the class.   Notes:

## 2024-11-08 ENCOUNTER — HOSPITAL ENCOUNTER (OUTPATIENT)
Facility: HOSPITAL | Age: 79
Setting detail: OBSERVATION
Discharge: HOME | End: 2024-11-08
Attending: ORTHOPAEDIC SURGERY | Admitting: ORTHOPAEDIC SURGERY
Payer: MEDICARE

## 2024-11-08 ENCOUNTER — HOME HEALTH ADMISSION (OUTPATIENT)
Dept: HOME HEALTH SERVICES | Facility: HOME HEALTHCARE | Age: 79
End: 2024-11-08
Payer: MEDICARE

## 2024-11-08 ENCOUNTER — ANESTHESIA EVENT (OUTPATIENT)
Dept: PERIOP | Facility: HOSPITAL | Age: 79
End: 2024-11-08
Payer: MEDICARE

## 2024-11-08 ENCOUNTER — APPOINTMENT (OUTPATIENT)
Dept: GENERAL RADIOLOGY | Facility: HOSPITAL | Age: 79
End: 2024-11-08
Payer: MEDICARE

## 2024-11-08 ENCOUNTER — READMISSION MANAGEMENT (OUTPATIENT)
Dept: CALL CENTER | Facility: HOSPITAL | Age: 79
End: 2024-11-08
Payer: MEDICARE

## 2024-11-08 ENCOUNTER — ANESTHESIA (OUTPATIENT)
Dept: PERIOP | Facility: HOSPITAL | Age: 79
End: 2024-11-08
Payer: MEDICARE

## 2024-11-08 ENCOUNTER — DOCUMENTATION (OUTPATIENT)
Dept: HOME HEALTH SERVICES | Facility: HOME HEALTHCARE | Age: 79
End: 2024-11-08
Payer: MEDICARE

## 2024-11-08 VITALS
WEIGHT: 127.87 LBS | SYSTOLIC BLOOD PRESSURE: 157 MMHG | RESPIRATION RATE: 12 BRPM | DIASTOLIC BLOOD PRESSURE: 99 MMHG | BODY MASS INDEX: 24.14 KG/M2 | HEIGHT: 61 IN | TEMPERATURE: 97.6 F | OXYGEN SATURATION: 99 % | HEART RATE: 78 BPM

## 2024-11-08 DIAGNOSIS — M17.11 PRIMARY OSTEOARTHRITIS OF RIGHT KNEE: Primary | ICD-10-CM

## 2024-11-08 PROCEDURE — 25010000002 MORPHINE PER 10 MG: Performed by: ORTHOPAEDIC SURGERY

## 2024-11-08 PROCEDURE — 25810000003 LACTATED RINGERS SOLUTION: Performed by: ORTHOPAEDIC SURGERY

## 2024-11-08 PROCEDURE — G0378 HOSPITAL OBSERVATION PER HR: HCPCS

## 2024-11-08 PROCEDURE — 97110 THERAPEUTIC EXERCISES: CPT

## 2024-11-08 PROCEDURE — A9270 NON-COVERED ITEM OR SERVICE: HCPCS | Performed by: ORTHOPAEDIC SURGERY

## 2024-11-08 PROCEDURE — 25810000003 LACTATED RINGERS PER 1000 ML: Performed by: ANESTHESIOLOGY

## 2024-11-08 PROCEDURE — C1713 ANCHOR/SCREW BN/BN,TIS/BN: HCPCS | Performed by: ORTHOPAEDIC SURGERY

## 2024-11-08 PROCEDURE — 25010000002 PROPOFOL 10 MG/ML EMULSION: Performed by: NURSE ANESTHETIST, CERTIFIED REGISTERED

## 2024-11-08 PROCEDURE — 25010000002 EPINEPHRINE 1 MG/ML SOLUTION 30 ML VIAL: Performed by: ORTHOPAEDIC SURGERY

## 2024-11-08 PROCEDURE — 25010000002 KETOROLAC TROMETHAMINE PER 15 MG: Performed by: ORTHOPAEDIC SURGERY

## 2024-11-08 PROCEDURE — 63710000001 MELOXICAM 15 MG TABLET: Performed by: ORTHOPAEDIC SURGERY

## 2024-11-08 PROCEDURE — 63710000001 PREGABALIN 75 MG CAPSULE: Performed by: ORTHOPAEDIC SURGERY

## 2024-11-08 PROCEDURE — 97161 PT EVAL LOW COMPLEX 20 MIN: CPT

## 2024-11-08 PROCEDURE — 25010000002 ROPIVACAINE PER 1 MG: Performed by: ORTHOPAEDIC SURGERY

## 2024-11-08 PROCEDURE — 25010000002 FENTANYL CITRATE (PF) 50 MCG/ML SOLUTION: Performed by: NURSE ANESTHETIST, CERTIFIED REGISTERED

## 2024-11-08 PROCEDURE — 99024 POSTOP FOLLOW-UP VISIT: CPT | Performed by: ORTHOPAEDIC SURGERY

## 2024-11-08 PROCEDURE — 25010000002 ONDANSETRON PER 1 MG: Performed by: NURSE ANESTHETIST, CERTIFIED REGISTERED

## 2024-11-08 PROCEDURE — 25010000002 VANCOMYCIN 750 MG RECONSTITUTED SOLUTION 1 EACH VIAL: Performed by: ORTHOPAEDIC SURGERY

## 2024-11-08 PROCEDURE — 63710000001 HYDROCODONE-ACETAMINOPHEN 7.5-325 MG TABLET: Performed by: NURSE ANESTHETIST, CERTIFIED REGISTERED

## 2024-11-08 PROCEDURE — C1776 JOINT DEVICE (IMPLANTABLE): HCPCS | Performed by: ORTHOPAEDIC SURGERY

## 2024-11-08 PROCEDURE — 25010000002 MAGNESIUM SULFATE PER 500 MG OF MAGNESIUM: Performed by: NURSE ANESTHETIST, CERTIFIED REGISTERED

## 2024-11-08 PROCEDURE — 25010000002 ACETAMINOPHEN 10 MG/ML SOLUTION: Performed by: NURSE ANESTHETIST, CERTIFIED REGISTERED

## 2024-11-08 PROCEDURE — 25010000002 LIDOCAINE 2% SOLUTION: Performed by: NURSE ANESTHETIST, CERTIFIED REGISTERED

## 2024-11-08 PROCEDURE — A9270 NON-COVERED ITEM OR SERVICE: HCPCS | Performed by: NURSE ANESTHETIST, CERTIFIED REGISTERED

## 2024-11-08 PROCEDURE — 25010000002 HYDROMORPHONE 1 MG/ML SOLUTION: Performed by: NURSE ANESTHETIST, CERTIFIED REGISTERED

## 2024-11-08 PROCEDURE — 27447 TOTAL KNEE ARTHROPLASTY: CPT | Performed by: ORTHOPAEDIC SURGERY

## 2024-11-08 PROCEDURE — 25010000002 DEXAMETHASONE PER 1 MG: Performed by: ANESTHESIOLOGY

## 2024-11-08 PROCEDURE — 25010000002 SUGAMMADEX 200 MG/2ML SOLUTION: Performed by: NURSE ANESTHETIST, CERTIFIED REGISTERED

## 2024-11-08 PROCEDURE — 25010000002 HYDROMORPHONE PER 4 MG: Performed by: NURSE ANESTHETIST, CERTIFIED REGISTERED

## 2024-11-08 PROCEDURE — 25010000002 ROPIVACAINE PER 1 MG: Performed by: ANESTHESIOLOGY

## 2024-11-08 PROCEDURE — 25010000002 CEFAZOLIN PER 500 MG: Performed by: ORTHOPAEDIC SURGERY

## 2024-11-08 PROCEDURE — 25810000003 SODIUM CHLORIDE 0.9 % SOLUTION 250 ML FLEX CONT: Performed by: ORTHOPAEDIC SURGERY

## 2024-11-08 PROCEDURE — 73560 X-RAY EXAM OF KNEE 1 OR 2: CPT

## 2024-11-08 DEVICE — DEV CONTRL TISS STRATAFIXSPIRALMNCRYL PLSPS2 REV3/0 45CM: Type: IMPLANTABLE DEVICE | Site: KNEE | Status: FUNCTIONAL

## 2024-11-08 DEVICE — IMPLANTABLE DEVICE: Type: IMPLANTABLE DEVICE | Status: FUNCTIONAL

## 2024-11-08 DEVICE — DEV CONTRL TISS STRATAFIX SYMM PDS PLUS VIL CT-1 60CM: Type: IMPLANTABLE DEVICE | Site: KNEE | Status: FUNCTIONAL

## 2024-11-08 DEVICE — LEGION CRUCIATE RETAINING HIGH                                    FLEX HIGHLY CROSS LINKED                                    POLYETHYLENE SIZE 3-4 9MM
Type: IMPLANTABLE DEVICE | Site: KNEE | Status: FUNCTIONAL
Brand: LEGION

## 2024-11-08 DEVICE — PALACOS® R IS A FAST-CURING, RADIOPAQUE, POLY(METHYL METHACRYLATE)-BASED BONE CEMENT.PALACOS ® R CONTAINS THE X-RAY CONTRAST MEDIUM ZIRCONIUM DIOXIDE. TO IMPROVE VISIBILITY IN THE SURGICAL FIELD PALACOS ® R HAS BEEN COLOURED WITH CHLOROPHYLL (E141). THE BONE CEMENT IS PREPARED DIRECTLY BEFORE USE BY MIXING A POLYMER POWDER COMPONENT WITH A LIQUID MONOMER COMPONENT. A DUCTILE DOUGH FORMS WHICH CURES WITHIN A FEW MINUTES.
Type: IMPLANTABLE DEVICE | Site: KNEE | Status: FUNCTIONAL
Brand: PALACOS®

## 2024-11-08 DEVICE — GEN II 7.5MM RESUR PAT 29MM
Type: IMPLANTABLE DEVICE | Site: KNEE | Status: FUNCTIONAL
Brand: GENESIS II

## 2024-11-08 DEVICE — GENESIS II NON-POROUS TIBIAL                                    BASEPLATE SIZE 3 RIGHT
Type: IMPLANTABLE DEVICE | Site: KNEE | Status: FUNCTIONAL
Brand: GENESIS II

## 2024-11-08 DEVICE — LEGION CRUCIATE RETAINING OXINIUM                                    FEMORAL SIZE 3 RIGHT
Type: IMPLANTABLE DEVICE | Site: KNEE | Status: FUNCTIONAL
Brand: LEGION

## 2024-11-08 RX ORDER — HYDROCODONE BITARTRATE AND ACETAMINOPHEN 7.5; 325 MG/1; MG/1
1 TABLET ORAL EVERY 4 HOURS PRN
Status: DISCONTINUED | OUTPATIENT
Start: 2024-11-08 | End: 2024-11-08 | Stop reason: HOSPADM

## 2024-11-08 RX ORDER — PROPOFOL 10 MG/ML
VIAL (ML) INTRAVENOUS AS NEEDED
Status: DISCONTINUED | OUTPATIENT
Start: 2024-11-08 | End: 2024-11-08 | Stop reason: SURG

## 2024-11-08 RX ORDER — DIPHENHYDRAMINE HYDROCHLORIDE 50 MG/ML
12.5 INJECTION INTRAMUSCULAR; INTRAVENOUS
Status: DISCONTINUED | OUTPATIENT
Start: 2024-11-08 | End: 2024-11-08 | Stop reason: HOSPADM

## 2024-11-08 RX ORDER — TRANEXAMIC ACID 100 MG/ML
INJECTION, SOLUTION INTRAVENOUS AS NEEDED
Status: DISCONTINUED | OUTPATIENT
Start: 2024-11-08 | End: 2024-11-08 | Stop reason: SURG

## 2024-11-08 RX ORDER — MELOXICAM 7.5 MG/1
7.5 TABLET ORAL ONCE
Status: DISCONTINUED | OUTPATIENT
Start: 2024-11-08 | End: 2024-11-08

## 2024-11-08 RX ORDER — DEXAMETHASONE SODIUM PHOSPHATE 4 MG/ML
INJECTION, SOLUTION INTRA-ARTICULAR; INTRALESIONAL; INTRAMUSCULAR; INTRAVENOUS; SOFT TISSUE AS NEEDED
Status: DISCONTINUED | OUTPATIENT
Start: 2024-11-08 | End: 2024-11-08 | Stop reason: SURG

## 2024-11-08 RX ORDER — ACETAMINOPHEN 325 MG/1
650 TABLET ORAL EVERY 6 HOURS PRN
Status: CANCELLED | OUTPATIENT
Start: 2024-11-08 | End: 2024-11-11

## 2024-11-08 RX ORDER — DEXAMETHASONE SODIUM PHOSPHATE 4 MG/ML
INJECTION, SOLUTION INTRA-ARTICULAR; INTRALESIONAL; INTRAMUSCULAR; INTRAVENOUS; SOFT TISSUE
Status: COMPLETED | OUTPATIENT
Start: 2024-11-08 | End: 2024-11-08

## 2024-11-08 RX ORDER — MELOXICAM 7.5 MG/1
7.5 TABLET ORAL DAILY
Qty: 7 TABLET | Refills: 0 | Status: SHIPPED | OUTPATIENT
Start: 2024-11-08

## 2024-11-08 RX ORDER — ACETAMINOPHEN 10 MG/ML
INJECTION, SOLUTION INTRAVENOUS AS NEEDED
Status: DISCONTINUED | OUTPATIENT
Start: 2024-11-08 | End: 2024-11-08 | Stop reason: SURG

## 2024-11-08 RX ORDER — ONDANSETRON 4 MG/1
4 TABLET, ORALLY DISINTEGRATING ORAL EVERY 6 HOURS PRN
Status: CANCELLED | OUTPATIENT
Start: 2024-11-08

## 2024-11-08 RX ORDER — ASPIRIN 81 MG/1
81 TABLET ORAL 2 TIMES DAILY
Status: CANCELLED | OUTPATIENT
Start: 2024-11-09 | End: 2024-11-09

## 2024-11-08 RX ORDER — PROMETHAZINE HYDROCHLORIDE 25 MG/1
25 SUPPOSITORY RECTAL ONCE AS NEEDED
Status: DISCONTINUED | OUTPATIENT
Start: 2024-11-08 | End: 2024-11-08 | Stop reason: HOSPADM

## 2024-11-08 RX ORDER — MELOXICAM 15 MG/1
15 TABLET ORAL ONCE
Status: COMPLETED | OUTPATIENT
Start: 2024-11-08 | End: 2024-11-08

## 2024-11-08 RX ORDER — HYDROCODONE BITARTRATE AND ACETAMINOPHEN 7.5; 325 MG/1; MG/1
1-2 TABLET ORAL EVERY 4 HOURS PRN
Qty: 28 TABLET | Refills: 0 | Status: SHIPPED | OUTPATIENT
Start: 2024-11-08 | End: 2024-11-15 | Stop reason: SDUPTHER

## 2024-11-08 RX ORDER — NALOXONE HCL 0.4 MG/ML
0.2 VIAL (ML) INJECTION AS NEEDED
Status: DISCONTINUED | OUTPATIENT
Start: 2024-11-08 | End: 2024-11-08 | Stop reason: HOSPADM

## 2024-11-08 RX ORDER — MAGNESIUM HYDROXIDE 1200 MG/15ML
LIQUID ORAL AS NEEDED
Status: DISCONTINUED | OUTPATIENT
Start: 2024-11-08 | End: 2024-11-08 | Stop reason: HOSPADM

## 2024-11-08 RX ORDER — FENTANYL CITRATE 50 UG/ML
INJECTION, SOLUTION INTRAMUSCULAR; INTRAVENOUS AS NEEDED
Status: DISCONTINUED | OUTPATIENT
Start: 2024-11-08 | End: 2024-11-08 | Stop reason: SURG

## 2024-11-08 RX ORDER — LABETALOL HYDROCHLORIDE 5 MG/ML
5 INJECTION, SOLUTION INTRAVENOUS
Status: DISCONTINUED | OUTPATIENT
Start: 2024-11-08 | End: 2024-11-08 | Stop reason: HOSPADM

## 2024-11-08 RX ORDER — SODIUM CHLORIDE, SODIUM LACTATE, POTASSIUM CHLORIDE, CALCIUM CHLORIDE 600; 310; 30; 20 MG/100ML; MG/100ML; MG/100ML; MG/100ML
9 INJECTION, SOLUTION INTRAVENOUS CONTINUOUS
Status: DISCONTINUED | OUTPATIENT
Start: 2024-11-08 | End: 2024-11-08 | Stop reason: HOSPADM

## 2024-11-08 RX ORDER — DOCUSATE SODIUM 100 MG/1
100 CAPSULE, LIQUID FILLED ORAL 2 TIMES DAILY
Qty: 60 CAPSULE | Refills: 0 | Status: SHIPPED | OUTPATIENT
Start: 2024-11-08

## 2024-11-08 RX ORDER — PROMETHAZINE HYDROCHLORIDE 25 MG/1
25 TABLET ORAL ONCE AS NEEDED
Status: DISCONTINUED | OUTPATIENT
Start: 2024-11-08 | End: 2024-11-08 | Stop reason: HOSPADM

## 2024-11-08 RX ORDER — POLYETHYLENE GLYCOL 3350 17 G/17G
17 POWDER, FOR SOLUTION ORAL DAILY
Qty: 238 G | Refills: 0 | Status: SHIPPED | OUTPATIENT
Start: 2024-11-08 | End: 2024-11-22

## 2024-11-08 RX ORDER — IPRATROPIUM BROMIDE AND ALBUTEROL SULFATE 2.5; .5 MG/3ML; MG/3ML
3 SOLUTION RESPIRATORY (INHALATION) ONCE AS NEEDED
Status: DISCONTINUED | OUTPATIENT
Start: 2024-11-08 | End: 2024-11-08 | Stop reason: HOSPADM

## 2024-11-08 RX ORDER — DROPERIDOL 2.5 MG/ML
0.62 INJECTION, SOLUTION INTRAMUSCULAR; INTRAVENOUS
Status: DISCONTINUED | OUTPATIENT
Start: 2024-11-08 | End: 2024-11-08 | Stop reason: HOSPADM

## 2024-11-08 RX ORDER — ONDANSETRON 2 MG/ML
4 INJECTION INTRAMUSCULAR; INTRAVENOUS ONCE AS NEEDED
Status: CANCELLED | OUTPATIENT
Start: 2024-11-08

## 2024-11-08 RX ORDER — ASPIRIN 81 MG/1
TABLET ORAL
Qty: 60 TABLET | Refills: 0 | Status: SHIPPED | OUTPATIENT
Start: 2024-11-08 | End: 2024-12-24

## 2024-11-08 RX ORDER — LIDOCAINE HYDROCHLORIDE 10 MG/ML
0.5 INJECTION, SOLUTION INFILTRATION; PERINEURAL ONCE AS NEEDED
Status: DISCONTINUED | OUTPATIENT
Start: 2024-11-08 | End: 2024-11-08 | Stop reason: HOSPADM

## 2024-11-08 RX ORDER — ONDANSETRON 2 MG/ML
INJECTION INTRAMUSCULAR; INTRAVENOUS AS NEEDED
Status: DISCONTINUED | OUTPATIENT
Start: 2024-11-08 | End: 2024-11-08 | Stop reason: SURG

## 2024-11-08 RX ORDER — FENTANYL CITRATE 50 UG/ML
25 INJECTION, SOLUTION INTRAMUSCULAR; INTRAVENOUS
Status: DISCONTINUED | OUTPATIENT
Start: 2024-11-08 | End: 2024-11-08 | Stop reason: HOSPADM

## 2024-11-08 RX ORDER — ONDANSETRON 4 MG/1
4 TABLET, FILM COATED ORAL EVERY 8 HOURS PRN
Qty: 10 TABLET | Refills: 0 | Status: SHIPPED | OUTPATIENT
Start: 2024-11-08 | End: 2024-11-15 | Stop reason: SDUPTHER

## 2024-11-08 RX ORDER — HYDROCODONE BITARTRATE AND ACETAMINOPHEN 7.5; 325 MG/1; MG/1
1 TABLET ORAL EVERY 4 HOURS PRN
Status: CANCELLED | OUTPATIENT
Start: 2024-11-08 | End: 2024-11-15

## 2024-11-08 RX ORDER — FLUMAZENIL 0.1 MG/ML
0.2 INJECTION INTRAVENOUS AS NEEDED
Status: DISCONTINUED | OUTPATIENT
Start: 2024-11-08 | End: 2024-11-08 | Stop reason: HOSPADM

## 2024-11-08 RX ORDER — SODIUM CHLORIDE 0.9 % (FLUSH) 0.9 %
3-10 SYRINGE (ML) INJECTION AS NEEDED
Status: DISCONTINUED | OUTPATIENT
Start: 2024-11-08 | End: 2024-11-08 | Stop reason: HOSPADM

## 2024-11-08 RX ORDER — ASPIRIN 81 MG/1
81 TABLET ORAL EVERY 12 HOURS SCHEDULED
Status: CANCELLED | OUTPATIENT
Start: 2024-11-09

## 2024-11-08 RX ORDER — PREGABALIN 75 MG/1
150 CAPSULE ORAL ONCE
Status: COMPLETED | OUTPATIENT
Start: 2024-11-08 | End: 2024-11-08

## 2024-11-08 RX ORDER — LIDOCAINE HYDROCHLORIDE 20 MG/ML
INJECTION, SOLUTION INFILTRATION; PERINEURAL AS NEEDED
Status: DISCONTINUED | OUTPATIENT
Start: 2024-11-08 | End: 2024-11-08 | Stop reason: SURG

## 2024-11-08 RX ORDER — ROCURONIUM BROMIDE 10 MG/ML
INJECTION, SOLUTION INTRAVENOUS AS NEEDED
Status: DISCONTINUED | OUTPATIENT
Start: 2024-11-08 | End: 2024-11-08 | Stop reason: SURG

## 2024-11-08 RX ORDER — MAGNESIUM SULFATE HEPTAHYDRATE 500 MG/ML
INJECTION, SOLUTION INTRAMUSCULAR; INTRAVENOUS AS NEEDED
Status: DISCONTINUED | OUTPATIENT
Start: 2024-11-08 | End: 2024-11-08 | Stop reason: SURG

## 2024-11-08 RX ORDER — ROPIVACAINE HYDROCHLORIDE 5 MG/ML
INJECTION, SOLUTION EPIDURAL; INFILTRATION; PERINEURAL
Status: COMPLETED | OUTPATIENT
Start: 2024-11-08 | End: 2024-11-08

## 2024-11-08 RX ORDER — SODIUM CHLORIDE 0.9 % (FLUSH) 0.9 %
3 SYRINGE (ML) INJECTION EVERY 12 HOURS SCHEDULED
Status: DISCONTINUED | OUTPATIENT
Start: 2024-11-08 | End: 2024-11-08 | Stop reason: HOSPADM

## 2024-11-08 RX ORDER — HYDROCODONE BITARTRATE AND ACETAMINOPHEN 5; 325 MG/1; MG/1
1 TABLET ORAL ONCE AS NEEDED
Status: DISCONTINUED | OUTPATIENT
Start: 2024-11-08 | End: 2024-11-08 | Stop reason: HOSPADM

## 2024-11-08 RX ORDER — FAMOTIDINE 10 MG/ML
20 INJECTION, SOLUTION INTRAVENOUS ONCE
Status: COMPLETED | OUTPATIENT
Start: 2024-11-08 | End: 2024-11-08

## 2024-11-08 RX ORDER — HYDROMORPHONE HYDROCHLORIDE 1 MG/ML
0.25 INJECTION, SOLUTION INTRAMUSCULAR; INTRAVENOUS; SUBCUTANEOUS
Status: DISCONTINUED | OUTPATIENT
Start: 2024-11-08 | End: 2024-11-08 | Stop reason: HOSPADM

## 2024-11-08 RX ORDER — ONDANSETRON 2 MG/ML
4 INJECTION INTRAMUSCULAR; INTRAVENOUS ONCE AS NEEDED
Status: COMPLETED | OUTPATIENT
Start: 2024-11-08 | End: 2024-11-08

## 2024-11-08 RX ORDER — HYDRALAZINE HYDROCHLORIDE 20 MG/ML
5 INJECTION INTRAMUSCULAR; INTRAVENOUS
Status: DISCONTINUED | OUTPATIENT
Start: 2024-11-08 | End: 2024-11-08 | Stop reason: HOSPADM

## 2024-11-08 RX ORDER — EPHEDRINE SULFATE 50 MG/ML
5 INJECTION, SOLUTION INTRAVENOUS ONCE AS NEEDED
Status: DISCONTINUED | OUTPATIENT
Start: 2024-11-08 | End: 2024-11-08 | Stop reason: HOSPADM

## 2024-11-08 RX ADMIN — LIDOCAINE HYDROCHLORIDE 60 MG: 20 INJECTION, SOLUTION INFILTRATION; PERINEURAL at 07:05

## 2024-11-08 RX ADMIN — ACETAMINOPHEN 1000 MG: 1000 INJECTION INTRAVENOUS at 07:21

## 2024-11-08 RX ADMIN — FENTANYL CITRATE 25 MCG: 50 INJECTION, SOLUTION INTRAMUSCULAR; INTRAVENOUS at 09:27

## 2024-11-08 RX ADMIN — ONDANSETRON 4 MG: 2 INJECTION, SOLUTION INTRAMUSCULAR; INTRAVENOUS at 10:22

## 2024-11-08 RX ADMIN — MAGNESIUM SULFATE HEPTAHYDRATE 1 G: 500 INJECTION, SOLUTION INTRAMUSCULAR; INTRAVENOUS at 07:47

## 2024-11-08 RX ADMIN — FAMOTIDINE 20 MG: 10 INJECTION INTRAVENOUS at 06:27

## 2024-11-08 RX ADMIN — HYDROMORPHONE HYDROCHLORIDE 0.25 MG: 1 INJECTION, SOLUTION INTRAMUSCULAR; INTRAVENOUS; SUBCUTANEOUS at 08:58

## 2024-11-08 RX ADMIN — PROPOFOL 125 MCG/KG/MIN: 10 INJECTION, EMULSION INTRAVENOUS at 07:11

## 2024-11-08 RX ADMIN — VANCOMYCIN HYDROCHLORIDE 750 MG: 750 INJECTION, POWDER, LYOPHILIZED, FOR SOLUTION INTRAVENOUS at 05:59

## 2024-11-08 RX ADMIN — FENTANYL CITRATE 25 MCG: 50 INJECTION, SOLUTION INTRAMUSCULAR; INTRAVENOUS at 07:40

## 2024-11-08 RX ADMIN — PROPOFOL 50 MG: 10 INJECTION, EMULSION INTRAVENOUS at 07:49

## 2024-11-08 RX ADMIN — ROCURONIUM BROMIDE 40 MG: 10 INJECTION, SOLUTION INTRAVENOUS at 07:05

## 2024-11-08 RX ADMIN — ROPIVACAINE HYDROCHLORIDE 15 ML: 5 INJECTION EPIDURAL; INFILTRATION; PERINEURAL at 06:34

## 2024-11-08 RX ADMIN — PROPOFOL 40 MG: 10 INJECTION, EMULSION INTRAVENOUS at 08:17

## 2024-11-08 RX ADMIN — SODIUM CHLORIDE, POTASSIUM CHLORIDE, SODIUM LACTATE AND CALCIUM CHLORIDE: 600; 310; 30; 20 INJECTION, SOLUTION INTRAVENOUS at 08:58

## 2024-11-08 RX ADMIN — DEXAMETHASONE SODIUM PHOSPHATE 4 MG: 4 INJECTION, SOLUTION INTRA-ARTICULAR; INTRALESIONAL; INTRAMUSCULAR; INTRAVENOUS; SOFT TISSUE at 06:34

## 2024-11-08 RX ADMIN — TRANEXAMIC ACID 1000 MG: 100 INJECTION, SOLUTION INTRAVENOUS at 08:30

## 2024-11-08 RX ADMIN — MELOXICAM 15 MG: 15 TABLET ORAL at 06:04

## 2024-11-08 RX ADMIN — HYDROCODONE BITARTRATE AND ACETAMINOPHEN 1 TABLET: 7.5; 325 TABLET ORAL at 09:27

## 2024-11-08 RX ADMIN — HYDROMORPHONE HYDROCHLORIDE 0.25 MG: 1 INJECTION, SOLUTION INTRAMUSCULAR; INTRAVENOUS; SUBCUTANEOUS at 10:41

## 2024-11-08 RX ADMIN — SUGAMMADEX 200 MG: 100 INJECTION, SOLUTION INTRAVENOUS at 08:53

## 2024-11-08 RX ADMIN — HYDROMORPHONE HYDROCHLORIDE 0.25 MG: 1 INJECTION, SOLUTION INTRAMUSCULAR; INTRAVENOUS; SUBCUTANEOUS at 09:18

## 2024-11-08 RX ADMIN — FENTANYL CITRATE 25 MCG: 50 INJECTION, SOLUTION INTRAMUSCULAR; INTRAVENOUS at 07:36

## 2024-11-08 RX ADMIN — SODIUM CHLORIDE, POTASSIUM CHLORIDE, SODIUM LACTATE AND CALCIUM CHLORIDE 500 ML: 600; 310; 30; 20 INJECTION, SOLUTION INTRAVENOUS at 05:59

## 2024-11-08 RX ADMIN — SODIUM CHLORIDE 2 G: 900 INJECTION INTRAVENOUS at 06:55

## 2024-11-08 RX ADMIN — DEXAMETHASONE SODIUM PHOSPHATE 8 MG: 4 INJECTION, SOLUTION INTRA-ARTICULAR; INTRALESIONAL; INTRAMUSCULAR; INTRAVENOUS; SOFT TISSUE at 07:05

## 2024-11-08 RX ADMIN — ONDANSETRON 4 MG: 2 INJECTION INTRAMUSCULAR; INTRAVENOUS at 08:45

## 2024-11-08 RX ADMIN — PROPOFOL 110 MG: 10 INJECTION, EMULSION INTRAVENOUS at 07:05

## 2024-11-08 RX ADMIN — SODIUM CHLORIDE, POTASSIUM CHLORIDE, SODIUM LACTATE AND CALCIUM CHLORIDE: 600; 310; 30; 20 INJECTION, SOLUTION INTRAVENOUS at 07:03

## 2024-11-08 RX ADMIN — PROPOFOL 30 MG: 10 INJECTION, EMULSION INTRAVENOUS at 08:53

## 2024-11-08 RX ADMIN — PREGABALIN 150 MG: 75 CAPSULE ORAL at 06:04

## 2024-11-08 RX ADMIN — HYDROMORPHONE HYDROCHLORIDE 0.25 MG: 1 INJECTION, SOLUTION INTRAMUSCULAR; INTRAVENOUS; SUBCUTANEOUS at 10:46

## 2024-11-08 RX ADMIN — PROPOFOL 40 MG: 10 INJECTION, EMULSION INTRAVENOUS at 08:34

## 2024-11-08 NOTE — ANESTHESIA PREPROCEDURE EVALUATION
Anesthesia Evaluation     Patient summary reviewed and Nursing notes reviewed   no history of anesthetic complications:   NPO Solid Status: > 8 hours  NPO Liquid Status: > 2 hours           Airway   Mallampati: III  TM distance: >3 FB  Neck ROM: full  No difficulty expected  Dental    (+) implants        Pulmonary    Cardiovascular   Exercise tolerance: good (4-7 METS)    ECG reviewed    (+) hypertension, hyperlipidemia  (-) angina, orthopnea, PND, BUSTOS      Neuro/Psych  (+) headaches, dizziness/light headedness, numbness (b/l sciatica), psychiatric history Anxiety  GI/Hepatic/Renal/Endo    (+) GERD, PUD, GI bleeding (rectal, erosive gastritis history) , thyroid problem hypothyroidism    Musculoskeletal     Abdominal    Substance History   (+) alcohol use     OB/GYN          Other   arthritis,   history of cancer (breast)    ROS/Med Hx Other: Chronic closed angle glaucoma                  Anesthesia Plan    ASA 3     general   total IV anesthesia  (TIVA    With [facility] PSR     I have reviewed the patient's history and chart with the patient, including all pertinent laboratory results and imaging. I have explained the risks of anesthesia including but not limited to dental damage, corneal abrasion, nerve injury, MI, stroke, aspiration, and death. Patient has agreed to proceed.      Risks of peripheral nerve block were discussed with patient including but not limited to: inadequate block, bleeding, infection, persistent numbness or weakness, nerve damage, painful dysasthesia and need to protect limb while numb.      )  intravenous induction     Anesthetic plan, risks, benefits, and alternatives have been provided, discussed and informed consent has been obtained with: patient.      CODE STATUS:

## 2024-11-08 NOTE — THERAPY DISCHARGE NOTE
Patient Name: Lou Shaffer  : 1945    MRN: [id number]                              Today's Date: 2024       Admit Date: 2024    Visit Dx:     ICD-10-CM ICD-9-CM   1. Primary osteoarthritis of right knee  M17.11 715.16     Patient Active Problem List   Diagnosis    Anxiety    Dry skin    Hyperlipidemia    Adult hypothyroidism    Insomnia    Vitamin D deficiency    Hot flashes due to menopause    Anal polyp    Diarrhea    Chronic bilateral low back pain with bilateral sciatica    Chronic angle-closure glaucoma, bilateral, indeterminate stage    Secondary corneal edema, bilateral    Restless leg syndrome    Osteoarthritis of right knee    Medicare annual wellness visit, subsequent    Dizziness     Past Medical History:   Diagnosis Date    Acne     Adnexal mass     RIGHT, S/P JAZMIN BSO    Anxiety     Breast cancer     Chronic headaches 2016    MRI OF BRAIN AT [facility] SHOWED PARTIAL OPACIFICATIONS OF THE FRONTAL ETHMOID AND MAXILLARY SINUSES AS WELL AS LEFT SPHENOID SINUS WITH FLUID AND MUCOSAL THICKENING. MINIMAL SMALL VESSEL DISEASE IN THE CEREBRAL WHITE MATTER. THE REMAINDER IS WNL, NO SIGNIFICANT CHANGE WHEN COMPARED TO PRIOR MRI ON 2009.    Chronic low back pain     Collagenous colitis 2019    DDD (degenerative disc disease), cervical 2005    Diarrhea 2019    Disease of thyroid gland     HYPOTHYROIDISM    Dizziness 2018    SEEN AT [facility] ER    Dry skin     Dysuria 2019    Erosive gastritis with hemorrhage     Fall 03/10/2014    CONTUSION OF COCCYX, SEEN AT [facility] ER    GERD (gastroesophageal reflux disease)     Glaucoma     H/O hemorrhoids     History of strabismus     Hormone replacement therapy (HRT)     Hot flashes     Hyperlipidemia     Hypertension     Hypertensive emergency 2006    SEEN AT [facility] ER    Insomnia     Iron deficiency anemia     Leukopenia 2019    Night sweats 2018    Positive fecal occult blood test 2018    Post-menopausal     PUD (peptic  ulcer disease)     Rectal bleeding 07/2019    Right knee pain     Scar of nose     Trigger thumb of both hands 04/2018    UTI (urinary tract infection)     ON ANTIBIOTICS INSTRUCTED PT TO NOTIFY DR URBAN    Vitamin D deficiency      Past Surgical History:   Procedure Laterality Date    COLONOSCOPY N/A 05/27/2010    NON BLEEDING INTERNAL HEMORRHOIDS, BX SHOWED COLLAGENOUS COLITIS, RESCOPE IN 5 YRS, DR. PEREZ    COLONOSCOPY N/A 08/23/2004    WNL, RESCOPE IN 5 YRS, DR. CM HASTINGS AT [facility]    COLONOSCOPY N/A 02/08/2019    ENTIRE COLON WNL, RANDOM BX: COLLAGENOUS COLITIS, DR. ROSEANN PEREZ AT [place]    COLONOSCOPY N/A 02/26/2018    DIVERTICULOSIS THROUGHOUT SIGMOID, RESCOPE IN 5 YRS, DR. TAISHA MANNING AT [facility]    ENDOSCOPY N/A 05/27/2010    EROSIVE GASTROPATHY, BX SHOWED SCATTERED HELIOBACTER LIKE ORGANISMS, DR. PEREZ AT [facility]    EYE SURGERY Right     CORNEAL TRANSPLANT    EYE SURGERY      STRABISMUS REPAIR BY RESECTION    EYE SURGERY Right 02/23/2016    GLAUCOMA SURGERY, DR. RITA THORNE AT [place]    HYSTERECTOMY N/A 02/25/2002    [facility] BSO, DR. MICKIE MCFADDEN AT [facility]    LUMBAR DISCECTOMY ANTERIOR WITH ARTIFICIAL DISC IMPLANTATION N/A 1998    L5-S1 DISCECTOMY, Dr. Malik    OOPHORECTOMY Bilateral 02/25/2002    [facility] BSO, DR. MICKIE MCFADDEN AT [facility]    TONSILLECTOMY AND ADENOIDECTOMY Bilateral       General Information       Row Name 11/08/24 1329          Physical Therapy Time and Intention    Document Type discharge evaluation/summary  -MS     Mode of Treatment physical therapy;individual therapy  -MS       Row Name 11/08/24 1329          General Information    Patient Profile Reviewed yes  -MS     Prior Level of Function independent:  -MS     Barriers to Rehab none identified  -MS       Row Name 11/08/24 1329          Cognition    Orientation Status (Cognition) oriented x 3  -MS       Row Name 11/08/24 1329          Safety Issues/Impairments Affecting Functional Mobility    Comment, Safety Issues/Impairments  (Mobility) Gait belt used for safety.  -MS               User Key  (r) = Recorded By, (t) = Taken By, (c) = Cosigned By      Initials Name Provider Type    Adair Bruner, PT Physical Therapist                   Mobility       Row Name 11/08/24 1329          Bed Mobility    Bed Mobility supine-sit  -MS     Comment, (Bed Mobility) Up in chair this PM.  -MS       Row Name 11/08/24 1329          Sit-Stand Transfer    Sit-Stand [place] (Transfers) independent  -MS     Assistive Device (Sit-Stand Transfers) walker, front-wheeled  -MS       Row Name 11/08/24 1329          Gait/Stairs (Locomotion)    [place] Level (Gait) standby assist  -MS     Assistive Device (Gait) walker, front-wheeled  -MS     Distance in Feet (Gait) 120  -MS     [place] Level (Stairs) stand by assist  -MS     Handrail Location (Stairs) both sides  -MS     Number of Steps (Stairs) 3  -MS     Ascending Technique (Stairs) step-to-step  -MS     Descending Technique (Stairs) step-to-step  -MS               User Key  (r) = Recorded By, (t) = Taken By, (c) = Cosigned By      Initials Name Provider Type    Adair Bruner, PT Physical Therapist                   Obj/Interventions       Row Name 11/08/24 1330          Range of Motion Comprehensive    Comment, General Range of Motion BUE/LLE (WFL's)  -MS       Row Name 11/08/24 1330          Strength Comprehensive (MMT)    Comment, General Manual Muscle Testing (MMT) Assessment BUE/LLE (>/=3/5)  -MS       Row Name 11/08/24 1330          Motor Skills    Therapeutic Exercise --  Right TKR ther. ex. program x 10 reps completed  -MS               User Key  (r) = Recorded By, (t) = Taken By, (c) = Cosigned By      Initials Name Provider Type    Adair Bruner, PT Physical Therapist                   Goals/Plan    No documentation.                  Clinical Impression       Row Name 11/08/24 1330          Pain    Pretreatment Pain Rating 5/10  -MS     Posttreatment Pain Rating 5/10   -MS     Pain Location knee  -MS     Pain Side/Orientation right  -MS     Pain Management Interventions nursing notified;positioning techniques utilized;premedicated for activity  -MS       Row Name 11/08/24 1330          Plan of Care Review    Plan of Care Reviewed With patient;family  -MS       Row Name 11/08/24 1330          Positioning and Restraints    Pre-Treatment Position sitting in chair/recliner  -MS     Post Treatment Position chair  -MS     In Chair notified nsg;reclined;sitting;call light within reach;encouraged to call for assist;with family/caregiver  Ice pack to Right knee.  -MS               User Key  (r) = Recorded By, (t) = Taken By, (c) = Cosigned By      Initials Name Provider Type    Adair Bruner, PT Physical Therapist                   Outcome Measures       Row Name 11/08/24 1331          How much help from another person do you currently need...    Turning from your back to your side while in flat bed without using bedrails? 4  -MS     Moving from lying on back to sitting on the side of a flat bed without bedrails? 4  -MS     Moving to and from a bed to a chair (including a wheelchair)? 4  -MS     Standing up from a chair using your arms (e.g., wheelchair, bedside chair)? 4  -MS     Climbing 3-5 steps with a railing? 3  -MS     To walk in hospital room? 3  -MS     AM-PAC 6 Clicks Score (PT) 22  -MS     Highest Level of Mobility Goal 7 --> Walk 25 feet or more  -MS       Row Name 11/08/24 1331          Functional Assessment    Outcome Measure Options AM-PAC 6 Clicks Basic Mobility (PT)  -MS               User Key  (r) = Recorded By, (t) = Taken By, (c) = Cosigned By      Initials Name Provider Type    Adair Bruner, PT Physical Therapist                  Physical Therapy Education       Title: PT OT SLP Therapies (Resolved)       Topic: Physical Therapy (Resolved)       Point: Mobility training (Resolved)       Learning Progress Summary            Patient Acceptance, E,D, BALTAZAR HARRIS  by MS at 11/8/2024 1331                      Point: Home exercise program (Resolved)       Learning Progress Summary            Patient Acceptance, E,D, VU,DU by MS at 11/8/2024 1331                      Point: Body mechanics (Resolved)       Learning Progress Summary            Patient Acceptance, E,D, VU,DU by MS at 11/8/2024 1331                      Point: Precautions (Resolved)       Learning Progress Summary            Patient Acceptance, E,D, VU,DU by MS at 11/8/2024 1331                                      User Key       Initials Effective Dates Name Provider Type Discipline    MS 06/16/21 -  Adair Holcomb, PT Physical Therapist PT                  PT Recommendation and Plan           Time Calculation:         PT Charges       Row Name 11/08/24 1332             Time Calculation    Start Time 1150  -MS      Stop Time 1205  -MS      Time Calculation (min) 15 min  -MS      PT Received On 11/08/24  -MS         Time Calculation- PT    Total Timed Code Minutes- PT 14 minute(s)  -MS                User Key  (r) = Recorded By, (t) = Taken By, (c) = Cosigned By      Initials Name Provider Type    MS Adair Holcomb, PT Physical Therapist                  Therapy Charges for Today       Code Description Service Date Service Provider Modifiers Qty    50149065071 HC PT EVAL LOW COMPLEXITY 2 11/8/2024 Adair Holcomb, PT GP 1    26609694034 HC PT THER PROC EA 15 MIN 11/8/2024 Adair Holcomb, PT GP 1            PT G-Codes  Outcome Measure Options: AM-PAC 6 Clicks Basic Mobility (PT)  AM-PAC 6 Clicks Score (PT): 22    PT Discharge Summary  Anticipated Discharge Disposition (PT): home with assist, home with home health  Reason for Discharge: Discharge from facility  Discharge Destination: Home with assist, Home with home health    Adair Holcomb, PT  11/8/2024

## 2024-11-08 NOTE — DISCHARGE PLACEMENT REQUEST
"Lou Dang (79 y.o. Female)       Date of Birth   1945    Social Security Number       Address   [address] [place] [address]    Home Phone   [phone]    MRN   [id number]       Orthodoxy   [employer]    Marital Status                               Admission Date   11/8/24    Admission Type   Elective    Admitting Provider   Steve Montez MD    Attending Provider   Steve Montez MD    Department, Room/Bed   [facility] OR, [facility] OR/[facility] OR       Discharge Date       Discharge Disposition       Discharge Destination                                 Attending Provider: Steve Montez MD    Allergies: Meperidine    Isolation: None   Infection: None   Code Status: Not on file    Ht: 154.9 cm (61\")   Wt: 58 kg (127 lb 13.9 oz)    Admission Cmt: None   Principal Problem: Osteoarthritis of right knee [M17.11]                   Active Insurance as of 11/8/2024       Primary Coverage       Payor Plan Insurance Group Employer/Plan Group    HUMANA MEDICARE REPLACEMENT HUMANA MED ADV GROUP [id number]       Payor Plan Address Payor Plan Phone Number Payor Plan Fax Number Effective Dates    [address] [address] [phone]  1/1/2018 - None Entered    [facility] [id number]         Subscriber Name Subscriber Birth Date Member ID       LOU DANG 1945 [id number]                     Emergency Contacts        (Rel.) Home Phone Work Phone Mobile Phone    Jeremiah Dang (Spouse) [phone] -- [phone]              "

## 2024-11-08 NOTE — PROGRESS NOTES
"[facility] to follow for home PT.  Verified all info on facesheet.  Primary is patient's mobile, secondary is 's cell.  D/C planned for later today.  Patient goes by \"Majo\" or \"Eveline\".  No history of home health.  "

## 2024-11-08 NOTE — OUTREACH NOTE
Prep Survey      Flowsheet Row Responses   [facility] patient discharged from? [facility]   Is LACE score < 7 ? Yes   Eligibility [facility]   Date of Admission 11/08/24   Date of Discharge 11/08/24   Discharge Disposition Home-Health Care [facility]   Discharge diagnosis Right TOTAL KNEE ARTHROPLASTY   Does the patient have one of the following disease processes/diagnoses(primary or secondary)? Total Joint Replacement   Does the patient have Home health ordered? Yes   What is the Home health agency?  [facility]   Prep survey completed? Yes            Kassandra HERBERT - Registered Nurse

## 2024-11-08 NOTE — OP NOTE
Name: Lou Shaffer    YOB: 1945    DATE OF SURGERY: 11/8/2024    PREOPERATIVE DIAGNOSIS: Right knee end-stage osteoarthritis    POSTOPERATIVE DIAGNOSIS: Right knee end-stage osteoarthritis    PROCEDURE PERFORMED: Right total knee replacement     Surgical Approach: Knee Medial Parapatellar     SURGEON: Steve Montez M.D.    ASSISTANT: SRINIVAS ROSENBERG    A surgical assistant was integral in ensuring a successful outcome with this procedure.  The assistant was utilized to assist in positioning the patient, draping the patient, was used throughout the case to provide with retraction of tissues, suctioning of blood and body fluids for visualization, positioning of the extremity to allow for proper exposure so that I could perform the procedure.  Without the use of a surgical assistant during this procedure I feel that the outcome may have been compromised or would have been suboptimal or at risk for complications.    IMPLANTS: [employer]:     Implant Name Type Inv. Item Serial No.  Lot No. LRB No. Used Action   CMT BONE PALACOS R HI/VISC 1X40 - [id number] Implant CMT BONE PALACOS R HI/VISC 1X40  [facility] [id number] Right 1 Implanted   DEV CONTRL TISS STRATAFIX SYMM PDS PLUS CHEN CT-1 60CM - [id number] Implant DEV CONTRL TISS STRATAFIX SYMM PDS PLUS CHEN CT-1 60CM  [employer]  [employer] J [id number] Right 1 Implanted   DEV CONTRL TISS STRATAFIXSPIRALMNCRYL PLSPS2 REV3/0 45CM - [id number] Implant DEV CONTRL TISS STRATAFIXSPIRALMNCRYL PLSPS2 REV3/0 45CM  [employer]  DIV OF J AND J [id number] Right 1 Implanted   PAT RESRF GEN2 7.5X29MM - [id number] Implant PAT RESRF GEN2 7.5X29MM  [employer] [id number] Right 1 Implanted   BASE TIB/KN GEN2 NONPOR TI SZ3 RT - [id number] Implant BASE TIB/KN GEN2 NONPOR TI SZ3 RT  [employer] [id number] Right 1 Implanted   COMP FEM LEGION OXINIUM CR SZ3 RT - [id number] Implant COMP FEM LEGION OXINIUM CR SZ3 RT  [employer] [id number] Right 1  Implanted   INSRT ART/KN LEGION CR HF XLPE SZ3TO4 9MM - [id number] Implant INSRT ART/KN LEGION CR HF XLPE SZ3TO4 9MM  [employer] [id number] Right 1 Implanted       Estimated Blood Loss: 100cc  Specimens : none  Complications: none    DESCRIPTION OF PROCEDURE: The patient was taken to the operating room and placed in the supine position. A sequential compression device was carefully placed on the non-operative leg. Preoperative antibiotics were administered. Surgical time out was performed. After adequate induction of anesthesia, the leg was prepped and draped in the usual sterile fashion, exsanguinated with an Esmarch bandage and the tourniquet inflated to 250 mmHg. A midline incision was performed followed by a medial parapatellar arthrotomy. The patella was subluxed laterally.  A portion of the fat pad, ACL, and anterior horns of the meniscus were excised.  A drill hole was then placed in the center of the femoral canal in line with the canal.  It was irrigated and suctioned.  The intramedullary wilner was then placed and a 5 degree distal valgus cut was performed after the block was pinned in place appropriately.  The cut surface was then removed.  The sizing and rotation guide was then placed and seated appropriately.  It was sized and then the drill holes for the 4-in-1 cutting guide were placed in 3 degrees of external rotation based off of the posterior condyles.  The 4-in-1 cutting block was then placed and the femoral cuts were performed.  The excess bone was removed and the cut surfaces looked good.  At this point we placed the retractors around the proximal tibia and a slight release of the PCL fibers off of the posterior proximal tibia was performed.  We used the extramedullary tibial alignment guide and it was aligned appropriately and then the depth was set and the block pinned in place.  The tibial cut was then performed and the alignment guide was removed.  The tibial cut was removed and the cut  surface looked good.  The posterior horns of the menisci were then removed as well as the posterior osteophytes.  Flexion extension blocks were then used to check the balance of the knee. The tibial cut surface was then sized with the sizing templates and the tibial and femoral trial were then placed. The knee was placed in full extension and then the tibial tray rotation was then matched to the femoral rotation and marked.    Attention was then placed to the patella. The patella was noted to track centrally through range of motion. The patella was then sized with the trials. The thickness of the patella was then measured. The patella was resurfaced and the surrounding osteophytes were removed. The preoperative thickness was reproduced. The patella tracked centrally through range of motion.  We then checked the balance with the trial implants in place and there was excellent medial lateral and flexion-extension balance.     At this point all trial components were removed, the knee was copiously irrigated with pulsed lavage, and the knee was injected with anesthetic cocktail solution. The cut surfaces were then dried with clean lap sponges, and the components were cemented tibia, followed by femur, then patella. The knee was held in full extension and all excess cement was removed. The knee was held still until the cement had completely hardened.  The tourniquet was then released. Bleeders were carefully cauterized with cautery.  There was excellent hemostasis.  We then checked the knee again in both flexion and extension with the trial polyethylene spacer in place.  Range of motion was found to be excellent.  Tracking of the patella is midline.  The trial polyethylene spacer was removed and the final polyethylene was placed.  Again we checked range of motion and stability in both flexion and extension which was found to be excellent with central patellar tracking.   The knee was then copiously irrigated.  We closed  the knee in multiple layers in standard fashion. Sterile dressing were applied. At the end of the case, the sponge and needle counts were reported as being correct. There were no known complications. The patient was then transported to the recovery room.      Steve Montez M.D.

## 2024-11-08 NOTE — CASE MANAGEMENT/SOCIAL WORK
Continued Stay Note  [facility]     Patient Name: Lou Shaffer  MRN: [id number]  Today's Date: 11/8/2024    Admit Date: 11/8/2024    Plan: Home with [facility]    Discharge Plan       Row Name 11/08/24 1058       Plan    Plan Home with [facility]     Patient/Family in Agreement with Plan yes    Provided Post Acute Provider List? Yes    Post Acute Provider List Home Health    Delivered To Patient    Method of Delivery Telephone                   Discharge Codes    No documentation.                 Expected Discharge Date and Time       Expected Discharge Date Expected Discharge Time    Nov 8, 2024               Shannon Epley, RN

## 2024-11-08 NOTE — ANESTHESIA PROCEDURE NOTES
Adductor canal block      Patient reassessed immediately prior to procedure    Patient location during procedure: pre-op  Start time: 11/8/2024 6:29 AM  Stop time: 11/8/2024 6:34 AM  Reason for block: at surgeon's request and post-op pain management  Performed by  Anesthesiologist: Kassandra Rogers MD  Preanesthetic Checklist  Completed: patient identified, IV checked, site marked, risks and benefits discussed, surgical consent, monitors and equipment checked, pre-op evaluation and timeout performed  Prep:  Pt Position: supine  Sterile barriers:cap, gloves and mask  Prep: ChloraPrep  Patient monitoring: blood pressure monitoring, continuous pulse oximetry and EKG  Procedure    Guidance:ultrasound guided    ULTRASOUND INTERPRETATION.  Using ultrasound guidance a 21 G gauge needle was placed in close proximity to the nerve, at which point, under ultrasound guidance anesthetic was injected in the area of the nerve and spread of the anesthesia was seen on ultrasound in close proximity thereto.  There were no abnormalities seen on ultrasound; a digital image was taken; and the patient tolerated the procedure with no complications. Images:still images obtained, printed/placed on chart    Laterality:right  Block Type:adductor canal block  Injection Technique:single-shot  Needle Type:short-bevel and echogenic  Needle Gauge:21 G      Medications Used: dexamethasone (DECADRON) injection - Injection   4 mg - 11/8/2024 6:34:00 AM  ropivacaine (NAROPIN) 0.5 % injection - Injection   15 mL - 11/8/2024 6:34:00 AM      Medications  Comment:Ultrasound Interpretation:  Using ultrasound guidance the needle was placed in close proximity to the target nerve and anesthetic was injected in the area of the target nerve and/or bundles, and spread of the anesthetic was seen on ultrasound in close proximity thereto.  There were no abnormalities seen on ultrasound; a digital image was taken; and the patient tolerated the procedure with no  complications.    Block placed for postoperative pain control per surgeon request.       Post Assessment  Injection Assessment: negative aspiration for heme, no paresthesia on injection and incremental injection  Patient Tolerance:comfortable throughout block  Complications:no  Performed by: Kassandra Rogers MD

## 2024-11-08 NOTE — PLAN OF CARE
Goal Outcome Evaluation:  Plan of Care Reviewed With: patient, family      Pt. is currently independent/SBA with functional mobility and has no further questions/concerns regarding functional mobility or home safety.  Encouraged pt. to continue ther. ex. program 2-3 x's daily and to ambulate every 1-2 hours once home. Plan for discharge home this date.  Will sign off.             Anticipated Discharge Disposition (PT): home with assist, home with home health

## 2024-11-08 NOTE — ANESTHESIA POSTPROCEDURE EVALUATION
Patient: Lou Shaffer    Procedure Summary       Date: 11/08/24 Room / Location: [facility] OR [address] [place] [address] [facility]    Anesthesia Start: 0658 Anesthesia Stop: 0920    Procedure: Right TOTAL KNEE ARTHROPLASTY (Right: Knee) Diagnosis:       Primary osteoarthritis of right knee      (Primary osteoarthritis of right knee [M17.11])    Surgeons: Steve Montez MD Provider: Kassandra Rogers MD    Anesthesia Type: general ASA Status: 3            Anesthesia Type: general    Vitals  Vitals Value Taken Time   /91 11/08/24 1115   Temp 36.4 °C (97.6 °F) 11/08/24 1045   Pulse 77 11/08/24 1123   Resp 12 11/08/24 1045   SpO2 90 % 11/08/24 1123   Vitals shown include unfiled device data.        Post Anesthesia Care and Evaluation    Patient location during evaluation: bedside  Patient participation: complete - patient participated  Level of consciousness: awake and alert  Pain management: adequate    Airway patency: patent  Anesthetic complications: No anesthetic complications  PONV Status: controlled  Cardiovascular status: acceptable  Respiratory status: acceptable  Hydration status: acceptable

## 2024-11-08 NOTE — ANESTHESIA PROCEDURE NOTES
Airway  Urgency: elective    Date/Time: 11/8/2024 7:09 AM  Airway not difficult    General Information and Staff    Patient location during procedure: OR  Anesthesiologist: Kassandra Rogers MD  CRNA/CAA: Brunilda Rubio CRNA    Indications and Patient Condition  Indications for airway management: airway protection    Preoxygenated: yes  Mask difficulty assessment: 2 - vent by mask + OA or adjuvant +/- NMBA    Final Airway Details  Final airway type: endotracheal airway      Successful airway: ETT  Cuffed: yes   Successful intubation technique: direct laryngoscopy  Facilitating devices/methods: intubating stylet  Endotracheal tube insertion site: oral  Blade: Priscilla  Blade size: 3  ETT size (mm): 7.0  Cormack-Lehane Classification: grade IIb - view of arytenoids or posterior of glottis only  Placement verified by: chest auscultation and capnometry   Cuff volume (mL): 6  Measured from: teeth  ETT/EBT  to teeth (cm): 21  Number of attempts at approach: 1  Assessment: lips, teeth, and gum same as pre-op and atraumatic intubation

## 2024-11-09 ENCOUNTER — HOME CARE VISIT (OUTPATIENT)
Dept: HOME HEALTH SERVICES | Facility: HOME HEALTHCARE | Age: 79
End: 2024-11-09
Payer: MEDICARE

## 2024-11-09 VITALS
DIASTOLIC BLOOD PRESSURE: 80 MMHG | SYSTOLIC BLOOD PRESSURE: 145 MMHG | RESPIRATION RATE: 18 BRPM | HEART RATE: 79 BPM | OXYGEN SATURATION: 97 % | TEMPERATURE: 98.6 F

## 2024-11-09 PROCEDURE — G0151 HHCP-SERV OF PT,EA 15 MIN: HCPCS | Performed by: PHYSICAL THERAPIST

## 2024-11-09 NOTE — CASE COMMUNICATION
Home Health ordered for: disciplines physical therapy                  Reason for Hosp/Primary Dx: 11/8/24 right total knee replacement Dr. Seaman                  Co-morbidities: hx of breast cancer, chronic headaches, anxiety, chronic LBP, DDD, GERD, HTN, insomnia, anemia, vitamin D Deficiency, UTI  anemia, depression, dyspnea, GERD, macular degeneration, OA, vertigo                     Focus of Care:         Skilled physical therap y to focus on increasing her strength, gait training, improve knee ROM, post surgical care, medication management fall risk safety, transfer training, and stair navigation  following a right total replacement     Current Functional status/mobility/DME: FWW, cane            HB status/Living Arrangements: Patient lives with her  in a multi story home with 5 cats                  Skin Integrity/wound status: MELISSA dressing                    Code Status: full code                  Fall Risk: high risk                   MD follow up: 11/22/24 Dr. Seaman

## 2024-11-09 NOTE — CASE COMMUNICATION
Dear Dr. Montez,    Mrs. Shaffer was admitted to [facility] following a recent right TKR.  Patient to be seen 1w1, 3w2 and then discharge to outpatient PT.    Thank you,  Valentina Arias, PT, DPT  [facility]

## 2024-11-09 NOTE — DISCHARGE SUMMARY
Patient Name: Lou Shaffer  Patient YOB: 1945    Date of Admission:  11/8/2024  Date of Discharge:  11/9/2024  Discharge Diagnosis: DE ARTHRP KNE CONDYLE&PLATU MEDIAL&LAT COMPARTMENTS [16954] (Right TOTAL KNEE ARTHROPLASTY)  Presenting Problem/History of Present Illness: Primary osteoarthritis of right knee [M17.11]  Osteoarthritis of right knee [M17.11]  Admitting Physician: Dr Steve Montez  Consults:   Consults       No orders found for last 30 day(s).            DETAILS OF HOSPITAL STAY:  Patient is a 79 y.o. female was admitted to the floor following the above procedure and underwent an uncomplicated hospital stay.  Patient did well with physical therapy and was ambulating well without problems.  On the day of discharge the wound was clean, dry and intact and calf was soft and nontender and Homans sign was negative.  Patient was tolerating  without problems.  Patient will be discharged home.    Condition on Discharge:  Stable    Vital Signs  Temp:  [97.6 °F (36.4 °C)] 97.6 °F (36.4 °C)  Heart Rate:  [76-81] 78  Resp:  [12-17] 12  BP: (136-159)/() 157/99    LABS:      No visits with results within 3 Day(s) from this visit.   Latest known visit with results is:   Pre-Admission Testing on 10/28/2024   Component Date Value Ref Range Status    Glucose 10/28/2024 82  65 - 99 mg/dL Final    BUN 10/28/2024 17  8 - 23 mg/dL Final    Creatinine 10/28/2024 0.60  0.57 - 1.00 mg/dL Final    Sodium 10/28/2024 135 (L)  136 - 145 mmol/L Final    Potassium 10/28/2024 4.7  3.5 - 5.2 mmol/L Final    Chloride 10/28/2024 102  98 - 107 mmol/L Final    CO2 10/28/2024 24.6  22.0 - 29.0 mmol/L Final    Calcium 10/28/2024 9.1  8.6 - 10.5 mg/dL Final    BUN/Creatinine Ratio 10/28/2024 28.3 (H)  7.0 - 25.0 Final    Anion Gap 10/28/2024 8.4  5.0 - 15.0 mmol/L Final    eGFR 10/28/2024 90.9  >60.0 mL/min/1.73 Final    WBC 10/28/2024 6.45  3.40 - 10.80 10*3/mm3 Final    RBC 10/28/2024 3.97  3.77 - 5.28 10*6/mm3  Final    Hemoglobin 10/28/2024 11.1 (L)  12.0 - 15.9 g/dL Final    Hematocrit 10/28/2024 35.4  34.0 - 46.6 % Final    MCV 10/28/2024 89.2  79.0 - 97.0 fL Final    MCH 10/28/2024 28.0  26.6 - 33.0 pg Final    MCHC 10/28/2024 31.4 (L)  31.5 - 35.7 g/dL Final    RDW 10/28/2024 13.9  12.3 - 15.4 % Final    RDW-SD 10/28/2024 45.0  37.0 - 54.0 fl Final    MPV 10/28/2024 9.7  6.0 - 12.0 fL Final    Platelets 10/28/2024 301  140 - 450 10*3/mm3 Final    ABO Type 10/28/2024 A   Final    RH type 10/28/2024 Positive   Final    Antibody Screen 10/28/2024 Negative   Final    T&S Expiration Date 10/28/2024 11/11/2024 11:59:00 PM   Final    Hemoglobin A1C 10/28/2024 5.50  4.80 - 5.60 % Final    QT Interval 10/28/2024 401  ms Final    QTC Interval 10/28/2024 424  ms Final       No results found.    Discharge Medications     Discharge Medications        New Medications        Instructions Start Date   Aspirin Low Dose 81 MG EC tablet  Generic drug: aspirin   Take 1 tablet by mouth 2 (Two) Times a Day for 14 days, THEN 1 tablet Daily for 30 days.   Start Date: November 8, 2024     docusate sodium 100 MG capsule  Commonly known as: Colace   100 mg, Oral, 2 Times Daily      meloxicam 7.5 MG tablet  Commonly known as: MOBIC   Take 1 tablet by mouth Daily with food.      ondansetron 4 MG tablet  Commonly known as: Zofran   4 mg, Oral, Every 8 Hours PRN      polyethylene glycol 17 GM/SCOOP powder  Commonly known as: MIRALAX   Mix 17g in 4-8oz of liquid and take by mouth once daily             Changes to Medications        Instructions Start Date   HYDROcodone-acetaminophen 7.5-325 MG per tablet  Commonly known as: NORCO  What changed: See the new instructions.   1-2 tablets, Oral, Every 4 Hours PRN, Take 2 tablets ONLY for severe pain.             Continue These Medications        Instructions Start Date   acetaminophen 500 MG tablet  Commonly known as: TYLENOL   500 mg, Every 6 Hours PRN      ALPRAZolam 0.5 MG tablet  Commonly known as:  XANAX   0.5 mg, Oral, Daily PRN      baclofen 10 MG tablet  Commonly known as: LIORESAL   10 mg, Oral, 2 Times Daily      benazepril 5 MG tablet  Commonly known as: LOTENSIN   5 mg, Oral, Every Night at Bedtime      Budesonide 3 MG 24 hr capsule  Commonly known as: ENTOCORT EC   3 mg, Daily      fenofibrate 160 MG tablet   160 mg, Oral, Daily      levothyroxine 75 MCG tablet  Commonly known as: SYNTHROID, LEVOTHROID   75 mcg, Oral, Daily      magnesium oxide 400 MG tablet  Commonly known as: MAG-OX   400 mg, 2 times daily      meclizine 25 MG tablet  Commonly known as: ANTIVERT   25 mg, Oral, 4 Times Daily      pantoprazole 40 MG EC tablet  Commonly known as: PROTONIX   40 mg, Oral, 2 Times Daily      Vitamin D-3 25 MCG (1000 UT) capsule   1 capsule, Daily               Activity at Discharge:   Activity Instructions       Discharge Activity      Dr. Steve Montez  [address]  [place] [address]  [phone]    Discharge Information (TOTAL KNEE REPLACEMENT)     The first 2-3 days after surgery your pain will likely be diminished due to medications that were given to you during surgery. It is very important to follow a few simple instructions to continue with good pain control after arriving home.    Activity control :  DON'T OVERDO IT, small amounts of activity on a frequent basis, place an emphasis on knee movement rather than prolonged periods of standing or activity.   DO work on bending and moving the knee regularly as the knee and surrounding tissues are solid and will not be injured by motion.  When in bed DON'T leave the knee in a bent position with a pillow under it for long periods. You may place a pillow under the ankle to allow for full extension (straightening) of the leg.  Ice - you should ice the knee as much as possible over the first week or two.  Placing a clean hand towel or pillowcase between the icepack and skin will allow you to ice for extended periods. If you choose to  use a Polar Care machine, make sure the pad is not directly on the skin and check hourly to make sure the skin looks OK. A thin sheet is best betweent he pad and skin if using the ice machine  Pain Medication - Take some pain medication (1-2 tablets) on a regular schedule (every 4-6 hours) for the first 72 hours after arriving home. This is important to keep the pain under control as the operative medication begins to wear off. The block will typically wear off around 48 hours after surgery and an increase in pain will typically occur for around 12 hours. To help with this pain make sure to ice, elevate and take scheduled pain medication every 4 hours. Make sure to have food in your stomach when taking the medication. If you develop any nausea with the pain medication, try taking Zofran 30 minutes before taking the pain pills. After the first 72 hours you can take the medication as needed based on your pain.  REMEMBER - PAIN MEDICATION WORKS BETTER AT PREVENTING PAIN THAN IT DOES IN CATCHING UP TO PAIN ONCE IT INCREASES.  Physical therapy:  Follow the instructions of your physical therapist.  Place an emphasis on early range of knee motion rather than strengthening    Showering: Dressing: The dressing is designed to be left in place until you return to the office in 2 weeks.  You may shower immediately upon return home,  If the dressing becomes disloged or saturated it should be changed.  Underneath the dressing is a sealed mesh strip which also makes the incision waterproof.  It should be left in place.  If for some reason the dressing is removed, the wound can be gently cleaned with antibacterial soap then allowed to dry and covered with a dry sterile dressing if any drainage is present.  Patient may change dressings daily and prn using sterile 4x4 and paper tape, and should call if any unusual drainage, redness or swelling.*    Driving : No driving during the first 2 weeks post surgery. After the 2 week visit,   "Melida will recommend when you're ready to drive.    Blood Clot (DVT) Prevention:  Keep legs elevated when possible to limit swelling  Perform \"calf pump\" exercises regularly to encourage blood flow through the calf veins.  Most patients will be discharged on aspirin 325mg (full strength) twice daily for 2 weeks, then once daily for four weeks. Some high risk patients may require Coumadin, Xarelto, or other blood thinners.    Follow-Up Visit: After arriving home, please call Dr. Montez's office ([phone]) to arrange your follow-up appointment. This visit will be approximately 2 weeks post-op. Your staples will be removed at this time.      Your Knee Implant: You have a new knee made of the finest materials available. It is made by [employer] and is called the Legion Oxinium Knee. For more information visit [email or web address]    Patient May Shower; No Tub Baths, Pools or Hot Tubs      Range of Motion      It is okay to work on knee range of motion small amounts on a frequent basis in addition to physical therapy work.    Weight Bearing As Tolerated      Weight Bearing Status      Weight Bearing Status: As Tolerated            Discharge Instructions: Patient is to continue with physical therapy exercises daily and continue working with the physical therapist as ordered. Patient may weight bear as tolerated. Apply ice regularly. Patient may ice for long periods of time as long as ice is not directly on the skin. Patient instructed on frequent calf pumping exercises.  Patient also instructed on incentive spirometer during hospitalization and encouraged to continue to use at home regularly.    Dressing: The dressing is designed to be left in place until you return to the office in 2 weeks.  The suction unit should stop functioning at 7 days and the green light will switch to yellow.  At that point the suction unit and tubing can be disconnected at the port closest to the dressing.  The suction " unit and tubing may be discarded.  You may shower immediately upon return home, you will need to turn the pump off by depressing the orange button once and then you may disconnect the pump and tubing at the connection port.  After showering, shake off the excess water and reattach the tubing.  Restart the pump by depressing the orange button one time and you will notice the green light flashing again.  If the dressing becomes disloged or saturated it should be changed. Please refer to the MELISSA information sheet if you have any questions about the dressing.  If you have a home health nurse or therapist they can be contacted to assist with dressing change or repair. You may also call the [facility] dressing hotline for questions related to the dressing ([phone]). If there still other problems or questions related to the dressing despite these measures then you can contact Aysha at our office [phone].  If for some reason the MELISAS dressing is removed, after 7 days the wound can be gently cleaned with antibacterial soap then allowed to dry and covered with a dry sterile dressing. The wound should be covered at all times except while showering.  Patient may change dressings daily and prn using sterile 4x4 and paper tape, and should call if any unusual drainage, redness or swelling.*  Follow up appointment in 2 weeks - patient to call the office at [phone] ([place])  to schedule.  Patient will be discharged on aspirin for DVT ppx    Discharge Diagnosis:    Osteoarthritis of right knee      Follow-up Appointments  Future Appointments   Date Time Provider Department Center   11/9/2024 To Be Determined Valentina Arias, PT [facility] None   11/22/2024  9:00 AM Steve Montez MD [facility] [address] [facility]   1/22/2025  2:30 PM León Madrid MD [facility]     Additional Instructions for the Follow-ups that You Need to Schedule       Ambulatory Referral to Home Health   As directed      Please transition to outpatient  therapy as soon as patient is ready to leave the house.  Ambulatory order has been placed.    Order Comments: Please transition to outpatient therapy as soon as patient is ready to leave the house.  Ambulatory order has been placed.    Face to Face Visit Date: 11/8/2024   Follow-up provider for Plan of Care?: I will be treating the patient on an ongoing basis.  Please send me the Plan of Care for signature.   Follow-up provider: TIERRA MCFADDEN [[id number]]   Reason/Clinical Findings: Post knee replacement surgery   Describe mobility limitations that make leaving home difficult: Pain, joint swelling, weakness, requiring use of assistive device.   Nursing/Therapeutic Services Requested: Physical Therapy   PT orders: Total joint pathway   Frequency: 1 Week 1        Ambulatory Referral to Physical Therapy   As directed      Post op right TKA    Order Comments: Post op right TKA    Specialty needed: Evaluate and treat POST OP   Follow-up needed: Yes        Apply Ice to Affected Knee Every 2 Hours   As directed      Discharge Follow-up with Specified Provider: Dr Steve Montez; 2 Weeks   As directed      To: Dr Steve Montez   Follow Up: 2 Weeks   Follow Up Details: call [phone] for appointment        Remove Dressing   As directed      Leave dressing ON UNTIL 2 WEEK F/U. May remove dressing if dressing is saturated or if peeling off.  If dressing is removed in the postoperative period the wound should be covered with gauze and tape.  If dressing is removed may shower after 1 week. For same day patient's, please d/c Hemovac drain prior to discharge.    Order Comments: Leave dressing ON UNTIL 2 WEEK F/U. May remove dressing if dressing is saturated or if peeling off.  If dressing is removed in the postoperative period the wound should be covered with gauze and tape.  If dressing is removed may shower after 1 week. For same day patient's, please d/c Hemovac drain prior to discharge.         Use Commercial Ice Wrap   As  directed                 Steve Montez MD  11/09/24  07:22 EST

## 2024-11-09 NOTE — HOME HEALTH
Home Health ordered for: disciplines physical therapy                  Reason for Hosp/Primary Dx: 11/8/24 right total knee replacement Dr. Seaman                  Co-morbidities: hx of breast cancer, chronic headaches, anxiety, chronic LBP, DDD, GERD, HTN, insomnia, anemia, vitamin D Deficiency, UTI  anemia, depression, dyspnea, GERD, macular degeneration, OA, vertigo                     Focus of Care:         Skilled physical therapy to focus on increasing her strength, gait training, improve knee ROM, post surgical care, medication management fall risk safety, transfer training, and stair navigation  following a right total replacement     Current Functional status/mobility/DME: FWW, cane            HB status/Living Arrangements: Patient lives with her  in a multi story home with 5 cats                  Skin Integrity/wound status: MELISSA dressing                   Code Status: full code                  Fall Risk: high risk                   MD follow up: 11/22/24 Dr. Seaman    Plan For Next Visit:  -gait training

## 2024-11-11 ENCOUNTER — TRANSITIONAL CARE MANAGEMENT TELEPHONE ENCOUNTER (OUTPATIENT)
Dept: CALL CENTER | Facility: HOSPITAL | Age: 79
End: 2024-11-11
Payer: MEDICARE

## 2024-11-11 ENCOUNTER — HOME CARE VISIT (OUTPATIENT)
Dept: HOME HEALTH SERVICES | Facility: HOME HEALTHCARE | Age: 79
End: 2024-11-11
Payer: MEDICARE

## 2024-11-11 ENCOUNTER — TELEPHONE (OUTPATIENT)
Dept: HOME HEALTH SERVICES | Facility: HOME HEALTHCARE | Age: 79
End: 2024-11-11
Payer: MEDICARE

## 2024-11-11 ENCOUNTER — TELEPHONE (OUTPATIENT)
Dept: ORTHOPEDIC SURGERY | Facility: CLINIC | Age: 79
End: 2024-11-11
Payer: MEDICARE

## 2024-11-11 ENCOUNTER — TELEPHONE (OUTPATIENT)
Dept: ORTHOPEDIC SURGERY | Facility: HOSPITAL | Age: 79
End: 2024-11-11
Payer: MEDICARE

## 2024-11-11 VITALS
SYSTOLIC BLOOD PRESSURE: 146 MMHG | RESPIRATION RATE: 18 BRPM | TEMPERATURE: 97 F | DIASTOLIC BLOOD PRESSURE: 90 MMHG | OXYGEN SATURATION: 94 % | HEART RATE: 88 BPM

## 2024-11-11 PROCEDURE — G0151 HHCP-SERV OF PT,EA 15 MIN: HCPCS | Performed by: PHYSICAL THERAPIST

## 2024-11-11 NOTE — TELEPHONE ENCOUNTER
Post op day 3  Discharge Instructions:  Ask patient about his or her discharge instructions  ?  Patient confirmed understanding   []  Further instruction needed   What, if any, recommendations, teaching, or interventions did you provide? Click or tap here to enter text.  Health status:  Pain controlled Yes   Pain is doing ok with the pain medication   Recommended interventions:  Yes  incision/dressing status   ?  Clean without redness, drainage, odor  []  Redness    []  Drainage - color Click or tap here to enter text.  []  Odor  MELISSA - Green light blinking Yes  Difficulties urination No  Last BM 11/11/2024 (if no BM by day 3-recommend OTC suppository or fleets enema)  Has had 2 since surgery.   Medications:  ?Medications reviewed with patient/family/caregiver  Patient taking medications as prescribed?   Yes  If not taking medications as prescribed, note specific medicine(s) and reason for each:  Click or tap here to enter text.  Hospital Follow Up Plan:  Follow up Appointment with Orthopedic surgeon:  ?Has f/u appointment                []Scheduled f/u appointment  Home Care ordered at discharge?    Yes        Home Care started, or contact made?    Yes   If no, action taken:   DME obtained/used in home?         Yes   Using IS  Choose an item.   Other information: Ms. Shaffer said she is doing ok. She had some increased pain yesterday and last night, but she didn't take her pain medication like she should, she is now and the pain is a lot better. She is doing the exercises, icing and elevating. PT has been out to see her and she did well. She did call the office, and they said they would check with the MD. She has been having a headache that won't go away. She said she gets them sometimes, and this one doesn't feel any different other than the fact she can't get rid of it. She is using ice and that seems to help. She was wondering if she could take ibuprofen or Aleve. Explained that generally they don't like you to  take NSAIDS while on the Mobic and ASA. She said she quit taking the Mobic as it caused her to throw up all day. So, since she isn't on that anymore can she take Ibuprofen instead. Told her I would let the MD know. Dressing looks good. There is a little spot on the dressing, but the green light is blinking. She was able to get in the shower and everything still looks good. Ms. Shaffer doesn't have any other questions for me at this time. She has my contact information should she need anything.

## 2024-11-11 NOTE — OUTREACH NOTE
Call Center TCM Note      Flowsheet Row Responses   [facility] patient discharged from? [facility]   Does the patient have one of the following disease processes/diagnoses(primary or secondary)? Total Joint Replacement   Joint surgery performed? Knee   TCM attempt successful? Yes   Call start time 1706   Call end time 1713   Discharge diagnosis Right TOTAL KNEE ARTHROPLASTY   Person spoke with today (if not patient) and relationship pt   Does the patient have all medications related to this admission filled (includes all antibiotics, pain medications, etc.) Yes   Is the patient taking all medications as directed (includes completed medication regime)? Yes   Is the patient able to teach back alternate methods of pain control? Ice, Reposition, Correct alignment, Short, frequent activity   Comments Post-op 11/22/24   Does the patient have an appointment with their PCP within 7-14 days of discharge? No   Nursing Interventions Patient desires to follow up with specialty only   What is the Home health agency?  [facility]   Has home health visited the patient within 72 hours of discharge? Yes   Psychosocial issues? No   Did the patient receive a copy of their discharge instructions? Yes   Nursing interventions Reviewed instructions with patient   What is the patient's perception of their functional status since discharge? Improving   Is the patient able to teach back signs and symptoms of infection? Blisters around incision, Increased swelling or redness around incision (not associated with surgical edema), Severe discomfort or pain, Shortness of breath or chest pain   Is the patient able to teach back how to prevent infection? Check incision daily, Shower only as directed by surgeon, Eat well-balanced diet, No tub baths, hot tub or swimming, No lotion or creams   Is the patient able to teach back signs and symptoms of DVT? Redness in calf   If the patient is a current smoker, are they able to teach back  resources for cessation? Not a smoker   Is the patient/caregiver able to teach back the hierarchy of who to call/visit for symptoms/problems? PCP, Specialist, Home health nurse, Urgent Care, ED, 911 Yes   TCM call completed? Yes   Wrap up additional comments Pt denies any fever, or increased redness, swelling, drainage at incisional site. Reviewed AVS/meds/instructions with pt. Pt verified post-op appt   Call end time 7513   Would this patient benefit from a Referral to [facility] Social Work? No   Is the patient interested in additional calls from an ambulatory ? No            Randa Munoz RN    11/11/2024, 17:14 EST

## 2024-11-11 NOTE — OUTREACH NOTE
Call Center TCM Note      Flowsheet Row Responses   [facility] facility patient discharged from? [place]   Does the patient have one of the following disease processes/diagnoses(primary or secondary)? Total Joint Replacement   Joint surgery performed? Knee   TCM attempt successful? No   Unsuccessful attempts Attempt 1   Call Status Left message            Randa Munoz RN    11/11/2024, 14:49 EST

## 2024-11-11 NOTE — TELEPHONE ENCOUNTER
Spoke with patient about the meloxicam and she states that she took a meloxiam this morning 11- after she had something to eat and states that she has no nausea or vomiting.

## 2024-11-11 NOTE — TELEPHONE ENCOUNTER
Headache since surgery 11-8-2024, her head is hurting down the back of head and temples, patient wants to know if she can't take ibuprofen. Patient currently has an ice pack on her head and it is helping with the pain.

## 2024-11-11 NOTE — TELEPHONE ENCOUNTER
Called patient back and stated per Dr. Montez; if she is still on the meloxicam he recommends not taking the ibuprofen, she can do one or the other. Patient can try tylenol. Patient stated that she has not taken the meloxicam and will start taking ibuprofen.

## 2024-11-11 NOTE — TELEPHONE ENCOUNTER
Provider: DR. URBAN    Caller: Lou Shaffer    Relationship to Patient: Self    Phone Number: [phone]    Reason for Call: PATIENT CALLED STATING SHE HAS BEEN HAVING A HEADACHE SINCE SURGERY, Right TOTAL KNEE ARTHROPLASTY 11/08/24. PATIENT WANTS TO KNOW IF SHE CAN TAKE ALEVE OR ASPIRIN. PLEASE ADVISE.

## 2024-11-11 NOTE — TELEPHONE ENCOUNTER
DR. MCFADDEN,  PATIENT HAS REPORTED TO PHYSICAL THERAPIST, ZEYAD MERCADO, THAT SHE WAS VOMITING ON 11/10 AND BELIEVES THAT IT WAS DUE TO THE MELOXICAM. SHE DID TAKE IT TODAY, BUT WAS WONDERING IF SHE COULD STOP TAKING IT.    ALSO, REPORTS TO PT THAT SHE HAS A HEADACHE THAT WON'T GO AWAY.  SHE NORMALLY TAKES ALIEVE AND IS WONDERING IF THAT WOULD BE OK OR IF SHE COULD TAKE MORE ASPIRIN.    PLEASE LET ME KNOW IF YOU WOULD LIKE ME TO RELAY INFORMATION TO THE PATIENT, OR IF YOUR OFFICE WILL BE CONTACTING HER TO ADDRESS.    THANKS,  Naina Correa LPN  Office Clinician  [facility]

## 2024-11-13 ENCOUNTER — HOME CARE VISIT (OUTPATIENT)
Dept: HOME HEALTH SERVICES | Facility: HOME HEALTHCARE | Age: 79
End: 2024-11-13
Payer: MEDICARE

## 2024-11-13 VITALS
SYSTOLIC BLOOD PRESSURE: 114 MMHG | HEART RATE: 72 BPM | RESPIRATION RATE: 18 BRPM | TEMPERATURE: 97.9 F | OXYGEN SATURATION: 97 % | DIASTOLIC BLOOD PRESSURE: 66 MMHG

## 2024-11-13 PROCEDURE — G0151 HHCP-SERV OF PT,EA 15 MIN: HCPCS | Performed by: PHYSICAL THERAPIST

## 2024-11-15 ENCOUNTER — HOME CARE VISIT (OUTPATIENT)
Dept: HOME HEALTH SERVICES | Facility: HOME HEALTHCARE | Age: 79
End: 2024-11-15
Payer: MEDICARE

## 2024-11-15 VITALS
OXYGEN SATURATION: 98 % | SYSTOLIC BLOOD PRESSURE: 138 MMHG | RESPIRATION RATE: 18 BRPM | HEART RATE: 64 BPM | DIASTOLIC BLOOD PRESSURE: 67 MMHG | TEMPERATURE: 97.8 F

## 2024-11-15 DIAGNOSIS — Z96.651 STATUS POST TOTAL RIGHT KNEE REPLACEMENT: Primary | ICD-10-CM

## 2024-11-15 DIAGNOSIS — M17.11 PRIMARY OSTEOARTHRITIS OF RIGHT KNEE: ICD-10-CM

## 2024-11-15 PROCEDURE — G0151 HHCP-SERV OF PT,EA 15 MIN: HCPCS | Performed by: PHYSICAL THERAPIST

## 2024-11-15 RX ORDER — ONDANSETRON 4 MG/1
4 TABLET, FILM COATED ORAL EVERY 8 HOURS PRN
Qty: 10 TABLET | Refills: 0 | Status: SHIPPED | OUTPATIENT
Start: 2024-11-15

## 2024-11-15 RX ORDER — HYDROCODONE BITARTRATE AND ACETAMINOPHEN 7.5; 325 MG/1; MG/1
1-2 TABLET ORAL EVERY 4 HOURS PRN
Qty: 28 TABLET | Refills: 0 | Status: SHIPPED | OUTPATIENT
Start: 2024-11-15

## 2024-11-15 NOTE — TELEPHONE ENCOUNTER
Caller: TAISHA DANG     Relationship: PATIENT     Best call back number: [phone]    Requested Prescriptions:   Requested Prescriptions     Pending Prescriptions Disp Refills    HYDROcodone-acetaminophen (NORCO) 7.5-325 MG per tablet 28 tablet 0     Sig: Take 1-2 tablets by mouth Every 4 (Four) Hours As Needed for Mild Pain or Moderate Pain. Take 2 tablets ONLY for severe pain.  Indications: Pain    ondansetron (Zofran) 4 MG tablet 10 tablet 0     Sig: Take 1 tablet by mouth Every 8 (Eight) Hours As Needed for Nausea or Vomiting for up to 10 doses. Indications: Nausea and Vomiting Following an Operation        Pharmacy where request should be sent:    [facility] [id number] - [place], [place] - [address] [facility] AT [facility] [address] [facility] [phone] [facility] [phone]    [address] [facility] [address]   Phone: [phone] Fax: [phone]   Hours: Not open 24 hours       Last office visit with prescribing clinician: 11/1/2024   Last telemedicine visit with prescribing clinician: Visit date not found   Next office visit with prescribing clinician: 11/22/2024     Additional details provided by patient: ONDANSETRON - COMPLETELY OUT, HYDROCODONE 2 TABLETS     Does the patient have less than a 3 day supply:  [x] Yes  [] No    Would you like a call back once the refill request has been completed: [] Yes [x] No    If the office needs to give you a call back, can they leave a voicemail: [] Yes [x] No    Dayan Flores Rep   11/15/24 08:24 EST

## 2024-11-18 ENCOUNTER — HOME CARE VISIT (OUTPATIENT)
Dept: HOME HEALTH SERVICES | Facility: HOME HEALTHCARE | Age: 79
End: 2024-11-18
Payer: MEDICARE

## 2024-11-18 VITALS
TEMPERATURE: 96.7 F | HEART RATE: 84 BPM | RESPIRATION RATE: 18 BRPM | SYSTOLIC BLOOD PRESSURE: 104 MMHG | DIASTOLIC BLOOD PRESSURE: 64 MMHG | OXYGEN SATURATION: 95 %

## 2024-11-18 PROCEDURE — G0151 HHCP-SERV OF PT,EA 15 MIN: HCPCS | Performed by: PHYSICAL THERAPIST

## 2024-11-20 ENCOUNTER — HOME CARE VISIT (OUTPATIENT)
Dept: HOME HEALTH SERVICES | Facility: HOME HEALTHCARE | Age: 79
End: 2024-11-20
Payer: MEDICARE

## 2024-11-20 VITALS
DIASTOLIC BLOOD PRESSURE: 60 MMHG | OXYGEN SATURATION: 97 % | SYSTOLIC BLOOD PRESSURE: 142 MMHG | HEART RATE: 83 BPM | TEMPERATURE: 97.1 F | RESPIRATION RATE: 18 BRPM

## 2024-11-20 PROCEDURE — G0151 HHCP-SERV OF PT,EA 15 MIN: HCPCS | Performed by: PHYSICAL THERAPIST

## 2024-11-21 DIAGNOSIS — M17.11 PRIMARY OSTEOARTHRITIS OF RIGHT KNEE: ICD-10-CM

## 2024-11-21 DIAGNOSIS — Z96.651 STATUS POST TOTAL RIGHT KNEE REPLACEMENT: ICD-10-CM

## 2024-11-21 RX ORDER — ONDANSETRON 4 MG/1
4 TABLET, FILM COATED ORAL EVERY 8 HOURS PRN
Qty: 10 TABLET | Refills: 0 | Status: SHIPPED | OUTPATIENT
Start: 2024-11-21

## 2024-11-21 RX ORDER — HYDROCODONE BITARTRATE AND ACETAMINOPHEN 7.5; 325 MG/1; MG/1
1-2 TABLET ORAL EVERY 4 HOURS PRN
Qty: 28 TABLET | Refills: 0 | Status: SHIPPED | OUTPATIENT
Start: 2024-11-21

## 2024-11-21 NOTE — TELEPHONE ENCOUNTER
Caller: TAISHA    Relationship: SELF    Best call back number: [phone]    Requested Prescriptions:   Requested Prescriptions     Pending Prescriptions Disp Refills    HYDROcodone-acetaminophen (NORCO) 7.5-325 MG per tablet 28 tablet 0     Sig: Take 1-2 tablets by mouth Every 4 (Four) Hours As Needed for Mild Pain or Moderate Pain. Take 2 tablets ONLY for severe pain.  Indications: Pain    ondansetron (Zofran) 4 MG tablet 10 tablet 0     Sig: Take 1 tablet by mouth Every 8 (Eight) Hours As Needed for Nausea or Vomiting for up to 10 doses. Indications: Nausea and Vomiting Following an Operation        Pharmacy where request should be sent:    [facility] [id number] - [facility] [address] [facility] AT [facility] [address]   Last office visit with prescribing clinician: 11/1/2024   Last telemedicine visit with prescribing clinician: Visit date not found   Next office visit with prescribing clinician: 11/22/2024     Additional details provided by patient:     Does the patient have less than a 3 day supply:  [x] Yes  [] No    Would you like a call back once the refill request has been completed: [x] Yes [] No    If the office needs to give you a call back, can they leave a voicemail: [x] Yes [] No    Dayan Messer Rep   11/21/24 09:39 EST

## 2024-11-22 ENCOUNTER — HOSPITAL ENCOUNTER (OUTPATIENT)
Dept: CARDIOLOGY | Facility: HOSPITAL | Age: 79
Discharge: HOME | End: 2024-11-22
Payer: MEDICARE

## 2024-11-22 ENCOUNTER — OFFICE VISIT (OUTPATIENT)
Dept: ORTHOPEDIC SURGERY | Facility: CLINIC | Age: 79
End: 2024-11-22
Payer: MEDICARE

## 2024-11-22 ENCOUNTER — TELEPHONE (OUTPATIENT)
Dept: ORTHOPEDIC SURGERY | Facility: CLINIC | Age: 79
End: 2024-11-22

## 2024-11-22 ENCOUNTER — HOME CARE VISIT (OUTPATIENT)
Dept: HOME HEALTH SERVICES | Facility: HOME HEALTHCARE | Age: 79
End: 2024-11-22
Payer: MEDICARE

## 2024-11-22 VITALS — TEMPERATURE: 97.8 F | WEIGHT: 134.2 LBS | HEIGHT: 61 IN | BODY MASS INDEX: 25.34 KG/M2

## 2024-11-22 VITALS
OXYGEN SATURATION: 97 % | SYSTOLIC BLOOD PRESSURE: 163 MMHG | HEART RATE: 66 BPM | TEMPERATURE: 98.1 F | DIASTOLIC BLOOD PRESSURE: 84 MMHG | RESPIRATION RATE: 18 BRPM

## 2024-11-22 DIAGNOSIS — M79.661 RIGHT CALF PAIN: ICD-10-CM

## 2024-11-22 DIAGNOSIS — Z96.651 S/P TKR (TOTAL KNEE REPLACEMENT), RIGHT: Primary | ICD-10-CM

## 2024-11-22 DIAGNOSIS — Z96.651 S/P TKR (TOTAL KNEE REPLACEMENT), RIGHT: ICD-10-CM

## 2024-11-22 DIAGNOSIS — M79.89 CALF SWELLING: ICD-10-CM

## 2024-11-22 DIAGNOSIS — M17.11 PRIMARY OSTEOARTHRITIS OF RIGHT KNEE: ICD-10-CM

## 2024-11-22 DIAGNOSIS — F41.9 ANXIETY: ICD-10-CM

## 2024-11-22 LAB
BH CV LOW VAS RIGHT SOLEAL VESSEL: 1
BH CV LOWER VASCULAR LEFT COMMON FEMORAL AUGMENT: NORMAL
BH CV LOWER VASCULAR LEFT COMMON FEMORAL COMPETENT: NORMAL
BH CV LOWER VASCULAR LEFT COMMON FEMORAL COMPRESS: NORMAL
BH CV LOWER VASCULAR LEFT COMMON FEMORAL PHASIC: NORMAL
BH CV LOWER VASCULAR LEFT COMMON FEMORAL SPONT: NORMAL
BH CV LOWER VASCULAR RIGHT COMMON FEMORAL AUGMENT: NORMAL
BH CV LOWER VASCULAR RIGHT COMMON FEMORAL COMPETENT: NORMAL
BH CV LOWER VASCULAR RIGHT COMMON FEMORAL COMPRESS: NORMAL
BH CV LOWER VASCULAR RIGHT COMMON FEMORAL PHASIC: NORMAL
BH CV LOWER VASCULAR RIGHT COMMON FEMORAL SPONT: NORMAL
BH CV LOWER VASCULAR RIGHT DISTAL FEMORAL COMPRESS: NORMAL
BH CV LOWER VASCULAR RIGHT GASTRONEMIUS COMPRESS: NORMAL
BH CV LOWER VASCULAR RIGHT GREATER SAPH AK COMPRESS: NORMAL
BH CV LOWER VASCULAR RIGHT GREATER SAPH BK COMPRESS: NORMAL
BH CV LOWER VASCULAR RIGHT LESSER SAPH COMPRESS: NORMAL
BH CV LOWER VASCULAR RIGHT MID FEMORAL AUGMENT: NORMAL
BH CV LOWER VASCULAR RIGHT MID FEMORAL COMPETENT: NORMAL
BH CV LOWER VASCULAR RIGHT MID FEMORAL COMPRESS: NORMAL
BH CV LOWER VASCULAR RIGHT MID FEMORAL PHASIC: NORMAL
BH CV LOWER VASCULAR RIGHT MID FEMORAL SPONT: NORMAL
BH CV LOWER VASCULAR RIGHT PERONEAL COMPRESS: NORMAL
BH CV LOWER VASCULAR RIGHT POPLITEAL AUGMENT: NORMAL
BH CV LOWER VASCULAR RIGHT POPLITEAL COMPETENT: NORMAL
BH CV LOWER VASCULAR RIGHT POPLITEAL COMPRESS: NORMAL
BH CV LOWER VASCULAR RIGHT POPLITEAL PHASIC: NORMAL
BH CV LOWER VASCULAR RIGHT POPLITEAL SPONT: NORMAL
BH CV LOWER VASCULAR RIGHT POSTERIOR TIBIAL COMPRESS: NORMAL
BH CV LOWER VASCULAR RIGHT PROFUNDA FEMORAL COMPRESS: NORMAL
BH CV LOWER VASCULAR RIGHT PROXIMAL FEMORAL COMPRESS: NORMAL
BH CV LOWER VASCULAR RIGHT SAPHENOFEMORAL JUNCTION COMPRESS: NORMAL
BH CV LOWER VASCULAR RIGHT SOLEAL COMPRESS: NORMAL
BH CV LOWER VASCULAR RIGHT SOLEAL THROMBUS: NORMAL
BH CV VAS PRELIMINARY FINDINGS SCRIPTING: 1

## 2024-11-22 PROCEDURE — 99024 POSTOP FOLLOW-UP VISIT: CPT | Performed by: ORTHOPAEDIC SURGERY

## 2024-11-22 PROCEDURE — 1159F MED LIST DOCD IN RCRD: CPT | Performed by: ORTHOPAEDIC SURGERY

## 2024-11-22 PROCEDURE — 93971 EXTREMITY STUDY: CPT

## 2024-11-22 PROCEDURE — 1160F RVW MEDS BY RX/DR IN RCRD: CPT | Performed by: ORTHOPAEDIC SURGERY

## 2024-11-22 PROCEDURE — G0151 HHCP-SERV OF PT,EA 15 MIN: HCPCS | Performed by: PHYSICAL THERAPIST

## 2024-11-22 RX ORDER — ALPRAZOLAM 0.5 MG
0.5 TABLET ORAL DAILY PRN
Qty: 45 TABLET | Refills: 1 | Status: SHIPPED | OUTPATIENT
Start: 2024-11-22

## 2024-11-22 NOTE — PROGRESS NOTES
Lou Shaffer : 1945 MRN: [id number] DATE: 2024    DIAGNOSIS: 2 week follow up right total knee      SUBJECTIVE:Patient returns today for 2 week follow up of right total knee replacement. Patient reports doing well with no unusual complaints. Appears to be progressing appropriately.  Patient did work with therapy but has been doing a treadmill with the therapist.  Has some generalized swelling.    OBJECTIVE:   Exam:. The incision is healing appropriately. No sign of infection. Range of motion is progressing as expected. The calf is soft and nontender with a negative Homans sign. Swollen. 0-85    ASSESSMENT: 2 week status post right knee replacement.    PLAN: 1) Dressing removed and steri strips applied   2) PT (outpatient)    6-week goals active range of motion 0-120.  Full extension is most important motion.  Walk without a limp or device.  Stairs with a reciprocal pattern and no railing assist to ascend.  Rail to use okay to descend.  Single leg stance for more than 10 seconds.  Stand from a 17 inch chair without upper extremity assistance.  No extension lag with straight leg raise.    For physical therapy no resistance on cardio machines for 3 months.  No weight machines for 3 months.  No treadmill.  No cuff weights greater than 2 pounds in the initial 6 weeks postoperatively.  No more than 5 pounds in the first 3 months.      Swelling we will send for stat Doppler to rule out blood clot however this may be be to overuse.  Stressed not to get on a treadmill.     3) Continue ice PRN   4) aspirin 81 mg orally every day for 1 month   5) Follow up in 4 weeks with repeat Xrays of right knee (3views)    Steve Montez MD  2024

## 2024-11-22 NOTE — HOME HEALTH
"Subjective: \"I am having increased pain in my knee today I think from doing the treadmill\"    Changes in Medications: none    Any Falls Since Last Visit: none    Assessment: Patient has made great progress with HH PT with improved mobility and improved strength bilaterally.  She is able to do the stairs independently around her home and is able to ambulate outside the house with no AD.  At this time patient has met her goals and is appropriate for outpatient PT."

## 2024-11-22 NOTE — TELEPHONE ENCOUNTER
I called patient did not answer left a voicemail instructing her to stop her aspirin and that we actually called and Eliquis for her to take as prescribed.      VASCULAR LAB CALLED, PATIENT POSITIVE FOR ACUTE DVT. HEATHER GREENOWOD SPOKE WITH THEM AND PATIENT AND CALLED IN PRESCRIPTION FOR XERALTO.

## 2024-11-22 NOTE — TELEPHONE ENCOUNTER
Spoke with Dr. Montez.  Patient's Dopplers positive for DVT.  He is recommending that the patient be on Eliquis 5 mg twice a day.  Will discontinue her aspirin.  RX for Xarelto was called in early and have contacted the pharmacist to dc Xarelto and to start her on Eliquis.  New prescription was sent to [facility] pharmacy at [id number] for Eliquis 5 mg, #60, refill x 1 with directions to take 1 p.o. twice daily per Dr. Montez

## 2024-11-22 NOTE — CASE COMMUNICATION
Dear Dr. Ochoa,    Mrs. Shaffer has been discharged form  at this time due to meeting her goals. Patient will begin outpatient PT after thanksgiving.    Thank you,  Valentina Arias, PT, DPT  [facility]

## 2024-11-22 NOTE — TELEPHONE ENCOUNTER
Rx Refill Note  Requested Prescriptions     Pending Prescriptions Disp Refills    ALPRAZolam (XANAX) 0.5 MG tablet [Pharmacy Med Name: ALPRAZolam 0.5 MG TABLET] 45 tablet      Sig: TAKE 1 TABLET BY MOUTH DAILY AS NEEDED FOR ANXIETY      Last office visit with prescribing clinician: 9/12/2024   Last telemedicine visit with prescribing clinician: Visit date not found   Next office visit with prescribing clinician: 12/2/2024                         Would you like a call back once the refill request has been completed: [] Yes [] No    If the office needs to give you a call back, can they leave a voicemail: [] Yes [] No    Aleisha Ji MA  11/22/24, 14:21 EST

## 2024-11-22 NOTE — PATIENT INSTRUCTIONS
6-week goals active range of motion 0-120.  Full extension is most important motion.  Walk without a limp or device.  Stairs with a reciprocal pattern and no railing assist to ascend.  Rail to use okay to descend.  Single leg stance for more than 10 seconds.  Stand from a 17 inch chair without upper extremity assistance.  No extension lag with straight leg raise.    For physical therapy no resistance on cardio machines for 3 months.  No weight machines for 3 months.  No treadmill.  No cuff weights greater than 2 pounds in the initial 6 weeks postoperatively.  No more than 5 pounds in the first 3 months.

## 2024-11-25 ENCOUNTER — TREATMENT (OUTPATIENT)
Dept: PHYSICAL THERAPY | Facility: CLINIC | Age: 79
End: 2024-11-25
Payer: MEDICARE

## 2024-11-25 DIAGNOSIS — M25.661 DECREASED RANGE OF MOTION OF RIGHT KNEE: ICD-10-CM

## 2024-11-25 DIAGNOSIS — Z96.651 STATUS POST TOTAL RIGHT KNEE REPLACEMENT: Primary | ICD-10-CM

## 2024-11-25 DIAGNOSIS — G89.18 POST-OP PAIN: ICD-10-CM

## 2024-11-25 DIAGNOSIS — R26.9 GAIT ABNORMALITY: ICD-10-CM

## 2024-11-25 NOTE — PROGRESS NOTES
Physical Therapy Initial Evaluation and Plan of Care  Clinic Location [address], [place] [id number]    Patient: Lou Shaffer   : 1945  Diagnosis/ICD-10 Code:  Status post total right knee replacement [Z96.651]  Referring practitioner: Steve Montez MD  Date of Initial Visit: 2024  Today's Date: 2024  Patient seen for 1 session         Visit Diagnoses:    ICD-10-CM ICD-9-CM   1. Status post total right knee replacement  Z96.651 V43.65   2. Post-op pain  G89.18 338.18   3. Decreased range of motion of right knee  M25.661 719.56   4. Gait abnormality  R26.9 781.2         Subjective Questionnaire: WOMAC Score: 46/96      Subjective Evaluation    History of Present Illness  Mechanism of injury: S/P R TKA 24. Pt reports setback from getting on the treadmill and having an instance of pain and swelling, but this has since subsided. Had home health PT for about 7 sessions.  Reports her knee feels a lot better today, but bulging discs in her back bother her. States no complications with surgery.    Reports R knee pain that aches that can sometimes radiate above or below knee to ankle. Unsure if some symptoms are due to back or knee at times. Appt with back surgeon in January.      Quality of life: good    Pain  Current pain ratin  At worst pain ratin  Location: R knee  Quality: dull ache and sharp  Relieving factors: ice, medications and rest  Aggravating factors: stairs, ambulation and prolonged positioning  Progression: improved    Social Support  Lives in: multiple-level home  Lives with: spouse    Patient Goals  Patient goals for therapy: decreased edema, decreased pain, increased strength and return to sport/leisure activities             Objective          Active Range of Motion   Left Knee   Normal active range of motion    Right Knee   Flexion: 102 degrees with pain  Extension: 6 degrees with pain    Strength/Myotome Testing     Left Knee   Normal  "strength    Right Knee   Flexion: 4-  Extension: 4-    Ambulation     Observational Gait   Gait: antalgic     Functional Assessment     Comments  TUG score = 12 seconds no AD.          Assessment & Plan       Assessment  Impairments: abnormal gait, abnormal or restricted ROM, activity intolerance, impaired balance, impaired physical strength, lacks appropriate home exercise program and pain with function   Functional limitations: walking, uncomfortable because of pain and standing   Assessment details: Pt is an 78 y/o female S/P R TKA. Pt presents with limitations in ROM, strength, balance and gait consistent with post-op status. Pt will benefit from PT to address impairments to allow pt to return to baseline performance of daily activities without limitation.  Prognosis: good    Goals  Plan Goals: Short Term Goals: 6 weeks. Patient will:    1. Patient to be adherent with HEP for ROM and strength.  2. Demonstrate R knee ROM 5 degrees - 110 degrees for improved functional mobility, gait and stair climbing.   3. Demonstrate adequate quad strength to perform SLR with no extensor lag.   4. Improve TUG score to < 12 seconds to display improved ambulation ability and reduced risk for falls.    Long Term Goals: 12 weeks. Patient will:    1. Report improved ADL's and functional activites as evidenced by WOMAC 35/96 or better.  2. Exhibit improved R knee AROM 0 degrees - 120 degrees to allow for improved gait, stair climbing, bending, and squatting as is necessary for daily tasks.    3. Demonstrate increased balance and stability of the knee by stepping over 6\" joanne without UE assist to traverse uneven terrain.  4. Demonstrate LE strength 4+/5 or better for improved gait, functional mobility and ADL's.      Plan  Therapy options: will be seen for skilled therapy services  Planned modality interventions: cryotherapy, electrical stimulation/Russian stimulation, TENS, thermotherapy (hydrocollator packs) and dry " needling  Planned therapy interventions: abdominal trunk stabilization, manual therapy, soft tissue mobilization, strengthening, stretching, therapeutic activities, home exercise program, gait training, functional ROM exercises, flexibility, neuromuscular re-education and joint mobilization  Frequency: 2x week  Duration in weeks: 12  Treatment plan discussed with: patient            Timed:         Manual Therapy:         mins  98117;     Therapeutic Exercise:    12     mins  14184;     Neuromuscular Tod:        mins  88104;    Therapeutic Activity:     13     mins  02884;     Gait Training:           mins  07706;     Ultrasound:          mins  89170;    Ionto                                   mins   14485  Self Care                            mins   71971  Canalith Repos         mins 81188      Un-Timed:  Electrical Stimulation:         mins  25564 ( );  Dry Needling          mins self-pay  Traction          mins 79896  Low Eval     10     Mins  39753  Mod Eval          Mins  91504  High Eval                            Mins  96238        Timed Treatment:   25   mins   Total Treatment:     35   mins          PT: Thuan Damon PT     [place] License #: [id number]  Electronically signed by Thuan Damon PT, 11/25/24, 2:38 PM EST    Certification Period: 11/25/2024 thru 2/22/2025  I certify that the therapy services are furnished while this patient is under my care.  The services outlined above are required by this patient, and will be reviewed every 90 days.         Physician Signature:__________________________________________________    PHYSICIAN: Steve Montez MD  NPI: [id number]                                      DATE:      Please sign and return via fax to [email or web address] . Thank you, [facility].

## 2024-11-25 NOTE — PATIENT INSTRUCTIONS
Access Code: [email or web address]  URL: [email or web address]  Date: 11/25/2024  Prepared by: Thuan Damon    Exercises  - Supine Hamstring Stretch with Strap  - 1 x daily - 7 x weekly - 1 sets - 4 reps - 20s hold  - Supine Heel Slide with Strap  - 2 x daily - 7 x weekly - 3 sets - 10 reps - 5-10s hold  - Long Sitting Quad Set with Towel Roll Under Heel  - 1 x daily - 7 x weekly - 3 sets - 10 reps  - Active Straight Leg Raise with Quad Set  - 1 x daily - 7 x weekly - 3 sets - 10 reps  - Straight Leg Raise with External Rotation  - 1 x daily - 7 x weekly - 3 sets - 10 reps  - Seated Passive Knee Extension with Weight  - 2 x daily - 7 x weekly - 1 sets - 3 mins hold

## 2024-11-27 ENCOUNTER — TREATMENT (OUTPATIENT)
Dept: PHYSICAL THERAPY | Facility: CLINIC | Age: 79
End: 2024-11-27
Payer: MEDICARE

## 2024-11-27 DIAGNOSIS — Z96.651 STATUS POST TOTAL RIGHT KNEE REPLACEMENT: ICD-10-CM

## 2024-11-27 DIAGNOSIS — M25.661 DECREASED RANGE OF MOTION OF RIGHT KNEE: ICD-10-CM

## 2024-11-27 DIAGNOSIS — G89.18 POST-OP PAIN: ICD-10-CM

## 2024-11-27 DIAGNOSIS — Z96.651 STATUS POST TOTAL RIGHT KNEE REPLACEMENT: Primary | ICD-10-CM

## 2024-11-27 DIAGNOSIS — M17.11 PRIMARY OSTEOARTHRITIS OF RIGHT KNEE: ICD-10-CM

## 2024-11-27 DIAGNOSIS — R26.9 GAIT ABNORMALITY: ICD-10-CM

## 2024-11-27 RX ORDER — HYDROCODONE BITARTRATE AND ACETAMINOPHEN 7.5; 325 MG/1; MG/1
1-2 TABLET ORAL EVERY 6 HOURS PRN
Qty: 28 TABLET | Refills: 0 | Status: SHIPPED | OUTPATIENT
Start: 2024-11-27 | End: 2024-12-02 | Stop reason: SDUPTHER

## 2024-11-27 RX ORDER — ONDANSETRON 4 MG/1
4 TABLET, FILM COATED ORAL EVERY 8 HOURS PRN
Qty: 10 TABLET | Refills: 0 | Status: SHIPPED | OUTPATIENT
Start: 2024-11-27 | End: 2024-12-02 | Stop reason: SDUPTHER

## 2024-11-27 NOTE — PROGRESS NOTES
Physical Therapy Daily Treatment Note  [facility]   [address], [address]  [place] [address]  P: [phone]  F: [phone]    Patient: Lou Shaffer   : 1945  Referring practitioner: Steve Montez MD  Date of Initial Visit: Type: THERAPY  Noted: 2024  Today's Date: 2024  Patient seen for 2 sessions       Visit Diagnoses:    ICD-10-CM ICD-9-CM   1. Status post total right knee replacement  Z96.651 V43.65   2. Post-op pain  G89.18 338.18   3. Decreased range of motion of right knee  M25.661 719.56   4. Gait abnormality  R26.9 781.2         Lou Shaffer reports: she was sore following last session. No other complaints.    Subjective     Objective   See Exercise, Manual, and Modality Logs for complete treatment.     Ambulates WNL w/out AD.    R knee AAROM 112 degrees.    Assessment:  Pt tolerated today's treatment session well with no adverse responses during treatment session. Pt continues to display limitations including R knee ROM and strength deficits consistent with her post-op status. Pt will benefit from continued skilled PT services.       Plan:  Progress per POC.         Timed:         Manual Therapy:         mins  48731;     Therapeutic Exercise:    15     mins  00107;     Neuromuscular Tod:    10    mins  47800;    Therapeutic Activity:     15     mins  44241;     Gait Training:           mins  94861;     Ultrasound:          mins  91189;    Ionto                                   mins  04105  Self Care                            mins  35943  Traction          mins 67901      Un-Timed:  Canalith Repos         mins 88983  Electrical Stimulation:         mins  50280 ( );  Dry Needling          mins self-pay  Traction          mins 48266        Timed Treatment:   40   mins   Total Treatment:     40   mins    Thuan Damon PT  [place] License #: [id number]    Physical Therapist

## 2024-11-27 NOTE — TELEPHONE ENCOUNTER
Caller: Lou Dang    Relationship: Self    Best call back number: [phone] (home)     Requested Prescriptions:   Requested Prescriptions     Pending Prescriptions Disp Refills    ondansetron (Zofran) 4 MG tablet 10 tablet 0     Sig: Take 1 tablet by mouth Every 8 (Eight) Hours As Needed for Nausea or Vomiting for up to 10 doses. Indications: Nausea and Vomiting Following an Operation    HYDROcodone-acetaminophen (NORCO) 7.5-325 MG per tablet 28 tablet 0     Sig: Take 1-2 tablets by mouth Every 4 (Four) Hours As Needed for Mild Pain or Moderate Pain. Take 2 tablets ONLY for severe pain.  Indications: Pain        Pharmacy where request should be sent: [facility] [id number] - [place] [address] [address] AT [facility] [address] [facility] [phone] [facility] [phone] FX     Last office visit with prescribing clinician: 11/22/2024   Last telemedicine visit with prescribing clinician: Visit date not found   Next office visit with prescribing clinician: 12/19/2024     Additional details provided by patient: MS DANG WOULD LIKE TO KNOW IF DR URBAN COULD PRESCRIBE HER LORTABS INSTEAD OF THE NORCO BECAUSE IT IS GIVING HER HEADACHES     Does the patient have less than a 3 day supply:  [x] Yes  [] No    Would you like a call back once the refill request has been completed: [x] Yes [] No    If the office needs to give you a call back, can they leave a voicemail: [x] Yes [] No    Dayan Ferreira Rep   11/27/24 08:23 EST

## 2024-12-02 ENCOUNTER — TELEPHONE (OUTPATIENT)
Dept: ORTHOPEDIC SURGERY | Facility: CLINIC | Age: 79
End: 2024-12-02
Payer: MEDICARE

## 2024-12-02 ENCOUNTER — OFFICE VISIT (OUTPATIENT)
Dept: FAMILY MEDICINE CLINIC | Facility: CLINIC | Age: 79
End: 2024-12-02
Payer: MEDICARE

## 2024-12-02 VITALS
WEIGHT: 131 LBS | BODY MASS INDEX: 24.73 KG/M2 | OXYGEN SATURATION: 96 % | HEART RATE: 83 BPM | HEIGHT: 61 IN | SYSTOLIC BLOOD PRESSURE: 128 MMHG | DIASTOLIC BLOOD PRESSURE: 72 MMHG | RESPIRATION RATE: 18 BRPM

## 2024-12-02 DIAGNOSIS — M17.11 PRIMARY OSTEOARTHRITIS OF RIGHT KNEE: ICD-10-CM

## 2024-12-02 DIAGNOSIS — Z96.651 STATUS POST TOTAL RIGHT KNEE REPLACEMENT: ICD-10-CM

## 2024-12-02 DIAGNOSIS — I10 PRIMARY HYPERTENSION: Primary | ICD-10-CM

## 2024-12-02 DIAGNOSIS — F41.9 ANXIETY: ICD-10-CM

## 2024-12-02 PROCEDURE — G2211 COMPLEX E/M VISIT ADD ON: HCPCS | Performed by: FAMILY MEDICINE

## 2024-12-02 PROCEDURE — 1125F AMNT PAIN NOTED PAIN PRSNT: CPT | Performed by: FAMILY MEDICINE

## 2024-12-02 PROCEDURE — 3074F SYST BP LT 130 MM HG: CPT | Performed by: FAMILY MEDICINE

## 2024-12-02 PROCEDURE — 99214 OFFICE O/P EST MOD 30 MIN: CPT | Performed by: FAMILY MEDICINE

## 2024-12-02 PROCEDURE — 3078F DIAST BP <80 MM HG: CPT | Performed by: FAMILY MEDICINE

## 2024-12-02 RX ORDER — HYDROCODONE BITARTRATE AND ACETAMINOPHEN 7.5; 325 MG/1; MG/1
1-2 TABLET ORAL EVERY 6 HOURS PRN
Qty: 28 TABLET | Refills: 0 | Status: SHIPPED | OUTPATIENT
Start: 2024-12-02 | End: 2024-12-09 | Stop reason: SDUPTHER

## 2024-12-02 RX ORDER — BENAZEPRIL HYDROCHLORIDE AND HYDROCHLOROTHIAZIDE 5; 6.25 MG/1; MG/1
1 TABLET ORAL DAILY
Qty: 90 TABLET | Refills: 3 | Status: SHIPPED | OUTPATIENT
Start: 2024-12-02

## 2024-12-02 RX ORDER — ALPRAZOLAM 0.5 MG
0.5 TABLET ORAL DAILY PRN
Qty: 45 TABLET | Refills: 2 | Status: SHIPPED | OUTPATIENT
Start: 2024-12-02

## 2024-12-02 RX ORDER — ONDANSETRON 4 MG/1
4 TABLET, FILM COATED ORAL EVERY 8 HOURS PRN
Qty: 10 TABLET | Refills: 0 | Status: SHIPPED | OUTPATIENT
Start: 2024-12-02 | End: 2024-12-09 | Stop reason: SDUPTHER

## 2024-12-02 NOTE — TELEPHONE ENCOUNTER
I called and spoke to the patient.  She states her swelling has improved some.  Still does little bit of pain discussed icing especially at bedtime using about 3 weeks.  Reports that her rescan her in 6 weeks from initial findings.  Muscle: Supportive medicine present for today.  Question answered.

## 2024-12-02 NOTE — TELEPHONE ENCOUNTER
Hub staff attempted to follow warm transfer process and was unsuccessful     Caller: Lou Shaffer    Relationship to patient: Self    Best call back number: [phone] (home)       Patient is needing: ITS BEEN THREE WEEKS SINCE  PATIENT SURGERY. SHE  IS EXPERIENCING THROBBING PAIN. IT DOESNT QUIT. ALSO WANTED TO INFORM THAT ITS STILL SWOLLEN..THE PAIN IS  ESPECIALLY BAD AT NIGHT. THINKS PT IS AGGRAVATING IT BUT WANTS TO KNOW IF THIS  IS NORMAL.      PATIENT WAS DISCOVERED TO HAVE  A BLOOD CLOT AND WAS PUT ON BLOOD THINNER. PATIENT WANTS TO KNOW  IS SHE STILL SUPPOSED TO BE TAKING THOSE.

## 2024-12-02 NOTE — TELEPHONE ENCOUNTER
Caller: Edmund Lou L    Relationship: Self    Best call back number: [phone] (home)       Requested Prescriptions:   Requested Prescriptions     Pending Prescriptions Disp Refills    HYDROcodone-acetaminophen (NORCO) 7.5-325 MG per tablet 28 tablet 0     Sig: Take 1-2 tablets by mouth Every 6 (Six) Hours As Needed for Mild Pain or Moderate Pain. Take 2 tablets ONLY for severe pain.  Indications: Pain        Pharmacy where request should be sent: [facility] [id number] - [facility] [address] AT [address] [facility] [address] [facility] [phone] [facility] [phone] FX     Last office visit with prescribing clinician: 11/22/2024   Last telemedicine visit with prescribing clinician: Visit date not found   Next office visit with prescribing clinician: 12/19/2024     Additional details provided by patient: NA     Does the patient have less than a 3 day supply:  [x] Yes  [] No    Would you like a call back once the refill request has been completed: [] Yes [x] No    If the office needs to give you a call back, can they leave a voicemail: [x] Yes [] No    Dayan Weber Rep   12/02/24 10:04 EST

## 2024-12-02 NOTE — PROGRESS NOTES
"Chief Complaint  Chief Complaint   Patient presents with    Back Pain     Pt here for 3 mon f/u        Subjective    History of Present Illness        Lou Shaffer presents to [facility] for   Back Pain  This is a chronic problem. The current episode started more than 1 year ago. The problem is unchanged. The pain is present in the lumbar spine. The quality of the pain is described as aching. The pain is moderate. The symptoms are aggravated by bending and twisting. Pertinent negatives include no bladder incontinence, bowel incontinence, chest pain, dysuria, numbness, paresis or weakness. She has tried NSAIDs for the symptoms.   Anxiety  Presents for follow-up visit.  Symptoms include nervous/anxious behavior and panic.  Patient reports no chest pain, confusion, dry mouth, excessive worry, feeling of choking, irritability, malaise, palpitations, shortness of breath or suicidal ideas. The severity of symptoms is moderate. Past treatments include benzodiazephines. The treatment provided significant relief. Compliance with medications is %.   Hypertension  This is a chronic problem. The current episode started more than 1 year ago. The problem has been stable since onset. The problem is controlled. Associated symptoms include anxiety. Pertinent negatives include no chest pain, palpitations or shortness of breath. Risk factors for coronary artery disease include dyslipidemia, family history and post-menopausal state. The current treatment provides significant improvement. There are no compliance problems.  There is no history of coarctation of the aorta, hyperaldosteronism, hypercortisolism, hyperparathyroidism or pheochromocytoma.      History of Present Illness      Objective   Vital Signs:   Visit Vitals  /72   Pulse 83   Resp 18   Ht 154.9 cm (60.98\")   Wt 59.4 kg (131 lb)   LMP  (LMP Unknown)   SpO2 96%   BMI 24.77 kg/m²          BMI is within normal parameters. No " other follow-up for BMI required.     Physical Exam  Vitals reviewed.   Constitutional:       Appearance: She is well-developed.   HENT:      Head: Normocephalic.      Right Ear: External ear normal.      Left Ear: External ear normal.      Nose: Nose normal.   Eyes:      Conjunctiva/sclera: Conjunctivae normal.   Cardiovascular:      Rate and Rhythm: Normal rate and regular rhythm.   Pulmonary:      Effort: Pulmonary effort is normal.      Breath sounds: Normal breath sounds.   Musculoskeletal:         General: Normal range of motion.      Cervical back: Normal range of motion and neck supple.      Lumbar back: Tenderness present.   Skin:     General: Skin is warm and dry.      Capillary Refill: Capillary refill takes less than 2 seconds.   Neurological:      Mental Status: She is alert and oriented to person, place, and time.        Physical Exam           Result Review :  Results                            Assessment and Plan      Diagnoses and all orders for this visit:    1. Primary hypertension (Primary)  Assessment & Plan:  Hypertension is stable and controlled  Continue current treatment regimen.  Dietary sodium restriction.  Weight loss.  Regular aerobic exercise.  Blood pressure will be reassessed in 3 months.    Orders:  -     benazepril-hydrochlorthiazide (LOTENSIN HCT) 5-6.25 MG per tablet; Take 1 tablet by mouth Daily. Indications: High Blood Pressure  Dispense: 90 tablet; Refill: 3    2. Anxiety  Assessment & Plan:  Patient symptoms are stable.  Patient was given a refill of her Xanax.  Patient was encouraged return to clinic in 3 months.    Orders:  -     ALPRAZolam (XANAX) 0.5 MG tablet; Take 1 tablet by mouth Daily As Needed for Anxiety. Indications: Feeling Anxious  Dispense: 45 tablet; Refill: 2       Assessment & Plan             Follow Up   No follow-ups on file.  Patient was given instructions and counseling regarding her condition or for health maintenance advice. Please see specific  information pulled into the AVS if appropriate.

## 2024-12-02 NOTE — TELEPHONE ENCOUNTER
Caller: Lou Shaffer HOOD    Relationship: Self    Best call back number: [phone] (home)       Requested Prescriptions:   Requested Prescriptions     Pending Prescriptions Disp Refills    ondansetron (Zofran) 4 MG tablet 10 tablet 0     Sig: Take 1 tablet by mouth Every 8 (Eight) Hours As Needed for Nausea or Vomiting for up to 10 doses. Indications: Nausea and Vomiting Following an Operation        Pharmacy where request should be sent: [facility] [id number] [facility] [id number] [facility] AT [facility] [phone] [facility] [phone] FX     Last office visit with prescribing clinician: 11/22/2024   Last telemedicine visit with prescribing clinician: Visit date not found   Next office visit with prescribing clinician: 12/19/2024     Additional details provided by patient: NA     Does the patient have less than a 3 day supply:  [x] Yes  [] No    Would you like a call back once the refill request has been completed: [] Yes [x] No    If the office needs to give you a call back, can they leave a voicemail: [x] Yes [] No    Dayan Weber Rep   12/02/24 10:06 EST

## 2024-12-05 ENCOUNTER — TREATMENT (OUTPATIENT)
Dept: PHYSICAL THERAPY | Facility: CLINIC | Age: 79
End: 2024-12-05
Payer: MEDICARE

## 2024-12-05 DIAGNOSIS — R26.9 GAIT ABNORMALITY: ICD-10-CM

## 2024-12-05 DIAGNOSIS — M25.661 DECREASED RANGE OF MOTION OF RIGHT KNEE: ICD-10-CM

## 2024-12-05 DIAGNOSIS — Z96.651 STATUS POST TOTAL RIGHT KNEE REPLACEMENT: Primary | ICD-10-CM

## 2024-12-05 DIAGNOSIS — G89.18 POST-OP PAIN: ICD-10-CM

## 2024-12-05 NOTE — PROGRESS NOTES
Physical Therapy Daily Treatment Note  [facility] [place]   [address], [address]  [place] [address]  P: [phone]  F: [phone]    Patient: Lou Shaffer   : 1945  Referring practitioner: Steve Montez MD  Date of Initial Visit: Type: THERAPY  Noted: 2024  Today's Date: 2024  Patient seen for 3 sessions       Visit Diagnoses:    ICD-10-CM ICD-9-CM   1. Status post total right knee replacement  Z96.651 V43.65   2. Post-op pain  G89.18 338.18   3. Decreased range of motion of right knee  M25.661 719.56   4. Gait abnormality  R26.9 781.2         Lou Shaffer reports: she has begun to notice the onset of LBP due to her knee. No other complaints.    Subjective     Objective   See Exercise, Manual, and Modality Logs for complete treatment.     R knee flexion AROM 112 degrees. R knee flexion AAROM with strap 118 degrees.    Assessment:  Pt tolerated today's treatment session well with no adverse responses during treatment session. Pt continues to display limitations including R knee ROM and strength deficits impacting her tolerance to ADLs and creating compensation pain in the contralateral knee and low back. HEP updated today with SLS and squats to chair. Pt will benefit from continued skilled PT services.       Plan:  Progress per POC.         Timed:         Manual Therapy:         mins  29650;     Therapeutic Exercise:    12     mins  03966;     Neuromuscular Tod:    15    mins  85048;    Therapeutic Activity:     11     mins  46663;     Gait Training:           mins  91544;     Ultrasound:          mins  99379;    Ionto                                   mins  25906  Self Care                            mins  02699  Traction          mins 05649      Un-Timed:  Canalith Repos         mins 27883  Electrical Stimulation:         mins  46781 (MC );  Dry Needling          mins self-pay  Traction          mins 43594        Timed Treatment:   38    mins   Total Treatment:     38   mins    Thuan Damon, PT  [place] License #: [id number]    Physical Therapist

## 2024-12-06 ENCOUNTER — TELEPHONE (OUTPATIENT)
Dept: ORTHOPEDIC SURGERY | Facility: HOSPITAL | Age: 79
End: 2024-12-06
Payer: MEDICARE

## 2024-12-06 NOTE — TELEPHONE ENCOUNTER
Called and spoke with Ms. Shaffer to see how she has been doing since her RTK 11/8. She said she is doing really well. Everything is healing good. She is working with PT and continues to progress. They said she is doing so well they were going to release her by the end of December. She is up and moving well. Ms. Shaffer doesn't have any questions for me at this time. She has my contact information should she need anything.

## 2024-12-09 DIAGNOSIS — Z96.651 STATUS POST TOTAL RIGHT KNEE REPLACEMENT: ICD-10-CM

## 2024-12-09 DIAGNOSIS — M17.11 PRIMARY OSTEOARTHRITIS OF RIGHT KNEE: ICD-10-CM

## 2024-12-09 RX ORDER — HYDROCODONE BITARTRATE AND ACETAMINOPHEN 7.5; 325 MG/1; MG/1
1-2 TABLET ORAL EVERY 8 HOURS PRN
Qty: 28 TABLET | Refills: 0 | Status: SHIPPED | OUTPATIENT
Start: 2024-12-09 | End: 2024-12-16 | Stop reason: ALTCHOICE

## 2024-12-09 RX ORDER — ONDANSETRON 4 MG/1
4 TABLET, FILM COATED ORAL EVERY 8 HOURS PRN
Qty: 10 TABLET | Refills: 0 | Status: SHIPPED | OUTPATIENT
Start: 2024-12-09

## 2024-12-09 NOTE — TELEPHONE ENCOUNTER
Caller: Lou Shaffer    Relationship: Self    Best call back number: [phone]    Requested Prescriptions: HYDROcodone-acetaminophen (NORCO) 7.5-325 MG per tablet  ondansetron (Zofran) 4 MG tablet      Requested Prescriptions      No prescriptions requested or ordered in this encounter        Pharmacy where request should be sent:  [facility]    Last office visit with prescribing clinician: 11/22/2024   Last telemedicine visit with prescribing clinician: Visit date not found   Next office visit with prescribing clinician: 12/19/2024     Additional details provided by patient:     Does the patient have less than a 3 day supply:  [x] Yes  [] No    Would you like a call back once the refill request has been completed: [] Yes [] No    If the office needs to give you a call back, can they leave a voicemail: [] Yes [] No    Dayan Carrera Rep   12/09/24 09:36 EST

## 2024-12-10 ENCOUNTER — TREATMENT (OUTPATIENT)
Dept: PHYSICAL THERAPY | Facility: CLINIC | Age: 79
End: 2024-12-10
Payer: MEDICARE

## 2024-12-10 DIAGNOSIS — G89.18 POST-OP PAIN: ICD-10-CM

## 2024-12-10 DIAGNOSIS — Z96.651 STATUS POST TOTAL RIGHT KNEE REPLACEMENT: Primary | ICD-10-CM

## 2024-12-10 DIAGNOSIS — M25.661 DECREASED RANGE OF MOTION OF RIGHT KNEE: ICD-10-CM

## 2024-12-10 DIAGNOSIS — R26.9 GAIT ABNORMALITY: ICD-10-CM

## 2024-12-10 PROCEDURE — 97112 NEUROMUSCULAR REEDUCATION: CPT

## 2024-12-10 PROCEDURE — 97110 THERAPEUTIC EXERCISES: CPT

## 2024-12-10 PROCEDURE — 97530 THERAPEUTIC ACTIVITIES: CPT

## 2024-12-10 NOTE — PROGRESS NOTES
Physical Therapy Daily Treatment Note  [facility] [place]   [address], [address]  [place] [address]  P: [phone]  F: [phone]    Patient: Lou Shaffer   : 1945  Referring practitioner: Steve Montez MD  Date of Initial Visit: Type: THERAPY  Noted: 2024  Today's Date: 12/10/2024  Patient seen for 4 sessions       Visit Diagnoses:    ICD-10-CM ICD-9-CM   1. Status post total right knee replacement  Z96.651 V43.65   2. Post-op pain  G89.18 338.18   3. Decreased range of motion of right knee  M25.661 719.56   4. Gait abnormality  R26.9 781.2         Lou Shaffer reports: has been sore some, attributed to being on her feet a lot.    Subjective     Objective   See Exercise, Manual, and Modality Logs for complete treatment.     R knee AROM 119 degrees flexion.    Assessment:  Pt tolerated today's treatment session well with no adverse responses during treatment session. Pt continues to display limitations including R knee ROM and strength deficits but is progressing well. Pt will benefit from continued skilled PT services.       Plan:  Progress per POC.         Timed:         Manual Therapy:         mins  21460;     Therapeutic Exercise:    20     mins  66349;     Neuromuscular Tod:    10    mins  80553;    Therapeutic Activity:     10     mins  33520;     Gait Training:           mins  20739;     Ultrasound:          mins  74937;    Ionto                                   mins  04221  Self Care                            mins  94746  Traction          mins 39618      Un-Timed:  Canalith Repos         mins 13286  Electrical Stimulation:         mins  43410 ( );  Dry Needling          mins self-pay  Traction          mins 82198        Timed Treatment:   40   mins   Total Treatment:     44   mins    Thuan Damon PT  [place] License #: [id number]    Physical Therapist

## 2024-12-12 ENCOUNTER — TREATMENT (OUTPATIENT)
Dept: PHYSICAL THERAPY | Facility: CLINIC | Age: 79
End: 2024-12-12
Payer: MEDICARE

## 2024-12-12 DIAGNOSIS — M25.661 DECREASED RANGE OF MOTION OF RIGHT KNEE: ICD-10-CM

## 2024-12-12 DIAGNOSIS — G89.18 POST-OP PAIN: ICD-10-CM

## 2024-12-12 DIAGNOSIS — Z96.651 STATUS POST TOTAL RIGHT KNEE REPLACEMENT: Primary | ICD-10-CM

## 2024-12-12 DIAGNOSIS — R26.9 GAIT ABNORMALITY: ICD-10-CM

## 2024-12-12 NOTE — PROGRESS NOTES
Physical Therapy Daily Treatment Note  [facility] [place]   [address], [address]  [place] [address]  P: [phone]  F: [phone]    Patient: Lou Shaffer   : 1945  Referring practitioner: Steve Montez MD  Date of Initial Visit: Type: THERAPY  Noted: 2024  Today's Date: 2024  Patient seen for 5 sessions       Visit Diagnoses:    ICD-10-CM ICD-9-CM   1. Status post total right knee replacement  Z96.651 V43.65   2. Post-op pain  G89.18 338.18   3. Decreased range of motion of right knee  M25.661 719.56   4. Gait abnormality  R26.9 781.2         Subjective     Lou Shaffer reports: No new changes to report this visit.         Objective   See Exercise, Manual, and Modality Logs for complete treatment.       Assessment:  Hurdles added for improved gait mechanics. Pt benefits from verbal cues to correct compensatory hip strategies with hurdles. Pt had no adverse responses to SLR intervention, improving from last visit. Pt continues to make good progress for current post-op status and will benefit from further skilled therapy for improved gait and stair navigation.         Plan:  Progress per Plan of Care            Timed:         Manual Therapy:         mins  23448;     Therapeutic Exercise:    23     mins  25620;     Neuromuscular Tod:        mins  06204;    Therapeutic Activity:     15     mins  03717;     Gait Training:           mins  91891;     Ultrasound:          mins  06548;    Ionto                                   mins  48729  Self Care                            mins  54193    Un-Timed:  Electrical Stimulation:         mins  06426 ( );  Traction          mins 66799        Timed Treatment:   38   mins   Total Treatment:     38   mins      Refugio Shah, Physical Therapist Assistant  [place] License #: [id number]

## 2024-12-16 DIAGNOSIS — Z96.651 STATUS POST TOTAL RIGHT KNEE REPLACEMENT: Primary | ICD-10-CM

## 2024-12-16 DIAGNOSIS — M17.11 PRIMARY OSTEOARTHRITIS OF RIGHT KNEE: ICD-10-CM

## 2024-12-16 RX ORDER — HYDROCODONE BITARTRATE AND ACETAMINOPHEN 7.5; 325 MG/1; MG/1
1-2 TABLET ORAL EVERY 8 HOURS PRN
Qty: 28 TABLET | Refills: 0 | Status: CANCELLED | OUTPATIENT
Start: 2024-12-16

## 2024-12-16 RX ORDER — HYDROCODONE BITARTRATE AND ACETAMINOPHEN 5; 325 MG/1; MG/1
1 TABLET ORAL EVERY 6 HOURS PRN
Qty: 30 TABLET | Refills: 0 | Status: SHIPPED | OUTPATIENT
Start: 2024-12-16

## 2024-12-16 NOTE — TELEPHONE ENCOUNTER
Caller: Lou Shaffer    Relationship: Self    Best call back number: [phone]    Requested Prescriptions: NORCO 7.5-325 MG     Pharmacy where request should be sent:  [phone]    Last office visit with prescribing clinician: 11/22/2024     Next office visit with prescribing clinician: 12/19/2024     Does the patient have less than a 3 day supply:  [x] Yes  [] No

## 2024-12-18 PROBLEM — I10 PRIMARY HYPERTENSION: Status: ACTIVE | Noted: 2024-12-18

## 2024-12-18 NOTE — ASSESSMENT & PLAN NOTE
Patient symptoms are stable.  Patient was given a refill of her Xanax.  Patient was encouraged return to clinic in 3 months.

## 2024-12-19 ENCOUNTER — OFFICE VISIT (OUTPATIENT)
Dept: ORTHOPEDIC SURGERY | Facility: CLINIC | Age: 79
End: 2024-12-19
Payer: MEDICARE

## 2024-12-19 ENCOUNTER — TREATMENT (OUTPATIENT)
Dept: PHYSICAL THERAPY | Facility: CLINIC | Age: 79
End: 2024-12-19
Payer: MEDICARE

## 2024-12-19 VITALS — BODY MASS INDEX: 24.91 KG/M2 | TEMPERATURE: 98.6 F | WEIGHT: 126.9 LBS | HEIGHT: 60 IN

## 2024-12-19 DIAGNOSIS — R52 PAIN: ICD-10-CM

## 2024-12-19 DIAGNOSIS — Z96.651 STATUS POST TOTAL RIGHT KNEE REPLACEMENT: Primary | ICD-10-CM

## 2024-12-19 DIAGNOSIS — M25.661 DECREASED RANGE OF MOTION OF RIGHT KNEE: ICD-10-CM

## 2024-12-19 DIAGNOSIS — G89.18 POST-OP PAIN: ICD-10-CM

## 2024-12-19 DIAGNOSIS — Z96.651 S/P TKR (TOTAL KNEE REPLACEMENT), RIGHT: Primary | ICD-10-CM

## 2024-12-19 DIAGNOSIS — R26.9 GAIT ABNORMALITY: ICD-10-CM

## 2024-12-19 PROCEDURE — 99024 POSTOP FOLLOW-UP VISIT: CPT | Performed by: ORTHOPAEDIC SURGERY

## 2024-12-19 NOTE — PROGRESS NOTES
Physical Therapy Daily Treatment Note  [facility] [place]   [address], [address]  [place] [address]  P: [phone]  F: [phone]    Patient: Lou Shaffer   : 1945  Referring practitioner: Steve Montez MD  Date of Initial Visit: Type: THERAPY  Noted: 2024  Today's Date: 2024  Patient seen for 6 sessions       Visit Diagnoses:    ICD-10-CM ICD-9-CM   1. Status post total right knee replacement  Z96.651 V43.65   2. Post-op pain  G89.18 338.18   3. Decreased range of motion of right knee  M25.661 719.56   4. Gait abnormality  R26.9 781.2         Lou Shaffer reports: she has been sore some, reports she is always on her feet. Knee feels fine today after taking a pill. F/U with surgeons office today.    Subjective     Objective   See Exercise, Manual, and Modality Logs for complete treatment.     R knee flexion AAROM 119 degrees.    Assessment:  Pt tolerated today's treatment session well with no adverse responses during treatment session. Pt continues to progress her strength adequately and is managing symptoms well. Sidesteps with resistance added to HEP today. Pt will benefit from continued skilled PT services.       Plan:  Progress per POC.         Timed:         Manual Therapy:         mins  37574;     Therapeutic Exercise:    21     mins  78412;     Neuromuscular Tod:    13    mins  78659;    Therapeutic Activity:     8     mins  16606;     Gait Training:           mins  86485;     Ultrasound:          mins  85599;    Ionto                                   mins  29588  Self Care                            mins  59664  Traction          mins 27790      Un-Timed:  Canalith Repos         mins 74877  Electrical Stimulation:         mins  94145 ( );  Dry Needling          mins self-pay  Traction          mins 19091        Timed Treatment:   42   mins   Total Treatment:     42   mins    Thuan Damon PT  [place] License #:  [id number]    Physical Therapist

## 2024-12-19 NOTE — PROGRESS NOTES
Lou Shaffer : 1945 MRN: [id number] DATE: 2024    DIAGNOSIS: 6 week follow up right total knee      SUBJECTIVE:Patient returns today for 6 week follow up of right  total knee replacement. Patient reports doing well with no unusual complaints. Appears to be progressing appropriately.    OBJECTIVE:   Exam:. The incision is well healed. No sign of infection.  [place] much improved.  Range of motion is measured at 2 to 125. The calf is soft and nontender with a negative Homans sign. Strength is progressing and the patient is ambulating appropriately.    DIAGNOSTIC STUDIES  Xrays: 3 views of the right  knee (AP, lateral, and sunrise) were ordered and reviewed for evaluation of recent knee replacement. They demonstrate a well positioned, well aligned knee replacement without complicating factors noted. In comparison with previous films there has been no change.    ASSESSMENT: 6 week status post left knee replacement.    PLAN: 1) Continue with PT exercises as prescribed   2) Continue eliquis. Will recheck Doppler in 2 weeks.   3) Follow up in 8 weeks    Antibiotics before dental and GI procedures discussed.     Steve Montez MD  2024

## 2024-12-20 ENCOUNTER — TELEPHONE (OUTPATIENT)
Dept: ORTHOPEDIC SURGERY | Facility: CLINIC | Age: 79
End: 2024-12-20
Payer: MEDICARE

## 2024-12-20 NOTE — TELEPHONE ENCOUNTER
Hub staff attempted to follow warm transfer process and was unsuccessful     Caller: Lou Shaffer    Relationship to patient: Self    Best call back number: [phone] (home)     Patient is needing: PATIENT WANTS TO KNOW IF SHE IS SUPPOSED TO CONTINUE ON THE ELIQUIS THAT DR URBAN HAD SENT IN.   SHE RECEIVED A CALL FROM HER PHARMACY THAT A REFILL WAS READY AND SHE WANTS TO VERIFY THAT SHE IS SUP[POSED TO STILL BE TAKING IT.     PATIENT ALSO STATES THAT IF SHE IS TO CONTINUE TAKING IT, WOULD THERE BE ANOTHER BRAND THAT COULD BE SENT IN AS HER INSURANCE DOES NOT FULLY PAY FOR THIS MEDICATION     PLEASE CALL AND ADVISE

## 2024-12-20 NOTE — TELEPHONE ENCOUNTER
Spoke with patient and let her know that per her office visit note from yesterday she may d/c the eliquis

## 2024-12-23 ENCOUNTER — TELEPHONE (OUTPATIENT)
Dept: PHYSICAL THERAPY | Facility: CLINIC | Age: 79
End: 2024-12-23

## 2024-12-23 DIAGNOSIS — Z96.651 STATUS POST TOTAL RIGHT KNEE REPLACEMENT: Primary | ICD-10-CM

## 2024-12-23 DIAGNOSIS — Z96.651 S/P TKR (TOTAL KNEE REPLACEMENT), RIGHT: ICD-10-CM

## 2024-12-23 DIAGNOSIS — M79.661 RIGHT CALF PAIN: ICD-10-CM

## 2024-12-23 DIAGNOSIS — Z96.651 STATUS POST TOTAL RIGHT KNEE REPLACEMENT: ICD-10-CM

## 2024-12-23 DIAGNOSIS — M79.89 CALF SWELLING: ICD-10-CM

## 2024-12-23 RX ORDER — HYDROCODONE BITARTRATE AND ACETAMINOPHEN 5; 325 MG/1; MG/1
1 TABLET ORAL EVERY 6 HOURS PRN
Qty: 30 TABLET | Refills: 0 | Status: CANCELLED | OUTPATIENT
Start: 2024-12-23

## 2024-12-23 NOTE — TELEPHONE ENCOUNTER
Doopler order has been placed, called central scheduling and they stated next available appointment for a doppler will be 1-.

## 2024-12-23 NOTE — TELEPHONE ENCOUNTER
Caller: Lou Shaffer    Relationship to patient: Self    Best call back number: [phone]*    Patient is needing: PT IS CALLING TO SEE IF DR URBAN WANTS HER TO CONTINUE TAKING THE HYDROCODONE OR NOT.. SHE REQUESTED A REFILL BUT ONLY RECEIVED A REFILL FOR ELIQUIS.. PLEASE ADVISE..

## 2024-12-23 NOTE — TELEPHONE ENCOUNTER
Caller: Edmund Lou HOOD    Relationship: Self    Best call back number: [phone]    Requested Prescriptions:   Requested Prescriptions     Pending Prescriptions Disp Refills    HYDROcodone-acetaminophen (Norco) 5-325 MG per tablet 30 tablet 0     Sig: Take 1 tablet by mouth Every 6 (Six) Hours As Needed for Mild Pain or Moderate Pain. Okay for 2 every 6 hours for severe pain only.        Pharmacy where request should be sent:  Pharmacy:   [facility] [id number] - [facility] [id number] [facility] AT [facility] [phone] [facility] [phone] FX     Last office visit with prescribing clinician: 12/19/2024   Last telemedicine visit with prescribing clinician: Visit date not found   Next office visit with prescribing clinician: 2/19/2025     Additional details provided by patient: PATIENT WANTED TO LET DR URBAN KNOW THAT HER PHYSICAL THERAPIST TOLD HER SHE COULD START USING HER TREADMILL AGAIN AND THAT SHE HAS BEEN.      Does the patient have less than a 3 day supply:  [x] Yes  [] No    Would you like a call back once the refill request has been completed: [] Yes [x] No    If the office needs to give you a call back, can they leave a voicemail: [x] Yes [] No    Dayan Thomas Rep   12/23/24 08:42 EST

## 2024-12-23 NOTE — TELEPHONE ENCOUNTER
Caller: Lou Shaffer    Relationship: Self         What was the call regarding: FEELINGE BETTER

## 2024-12-24 DIAGNOSIS — Z09 SURGERY FOLLOW-UP: Primary | ICD-10-CM

## 2024-12-24 RX ORDER — HYDROCODONE BITARTRATE AND ACETAMINOPHEN 5; 325 MG/1; MG/1
1 TABLET ORAL EVERY 6 HOURS PRN
Qty: 30 TABLET | Refills: 0 | Status: SHIPPED | OUTPATIENT
Start: 2024-12-24

## 2024-12-24 NOTE — TELEPHONE ENCOUNTER
I did refill the hydrocodone I had to fill it when I got to the office this morning it has been sent in

## 2024-12-26 NOTE — TELEPHONE ENCOUNTER
Called patient and left voicemail stating that Ms. Shaffer needs to continue taking the eliquis until after she has completed her repeat doppler per Dr. Montez's instructions. Repeat doppler is scheduled for 1-3-2025 at 1:45 with marcos

## 2025-01-01 ENCOUNTER — APPOINTMENT (OUTPATIENT)
Dept: GENERAL RADIOLOGY | Facility: HOSPITAL | Age: 80
End: 2025-01-01
Payer: MEDICARE

## 2025-01-01 ENCOUNTER — ANESTHESIA EVENT (OUTPATIENT)
Dept: PERIOP | Facility: HOSPITAL | Age: 80
End: 2025-01-01
Payer: MEDICARE

## 2025-01-01 ENCOUNTER — ANESTHESIA (OUTPATIENT)
Dept: PERIOP | Facility: HOSPITAL | Age: 80
End: 2025-01-01
Payer: MEDICARE

## 2025-01-01 ENCOUNTER — HOSPITAL ENCOUNTER (INPATIENT)
Facility: HOSPITAL | Age: 80
LOS: 1 days | End: 2025-06-24
Attending: NEUROLOGICAL SURGERY | Admitting: NEUROLOGICAL SURGERY
Payer: MEDICARE

## 2025-01-01 VITALS
OXYGEN SATURATION: 71 % | HEIGHT: 61 IN | RESPIRATION RATE: 37 BRPM | SYSTOLIC BLOOD PRESSURE: 194 MMHG | WEIGHT: 123 LBS | BODY MASS INDEX: 23.22 KG/M2 | DIASTOLIC BLOOD PRESSURE: 79 MMHG | TEMPERATURE: 98.4 F

## 2025-01-01 DIAGNOSIS — M43.16 SPONDYLOLISTHESIS AT L3-L4 LEVEL: ICD-10-CM

## 2025-01-01 DIAGNOSIS — M48.062 SPINAL STENOSIS OF LUMBAR REGION WITH NEUROGENIC CLAUDICATION: ICD-10-CM

## 2025-01-01 DIAGNOSIS — Z98.890 HISTORY OF LUMBAR SURGERY: ICD-10-CM

## 2025-01-01 PROCEDURE — 92950 HEART/LUNG RESUSCITATION CPR: CPT

## 2025-01-01 PROCEDURE — C1713 ANCHOR/SCREW BN/BN,TIS/BN: HCPCS | Performed by: NEUROLOGICAL SURGERY

## 2025-01-01 PROCEDURE — 25010000002 CEFAZOLIN PER 500 MG: Performed by: NURSE ANESTHETIST, CERTIFIED REGISTERED

## 2025-01-01 PROCEDURE — 0SG30AJ FUSION OF LUMBOSACRAL JOINT WITH INTERBODY FUSION DEVICE, POSTERIOR APPROACH, ANTERIOR COLUMN, OPEN APPROACH: ICD-10-PCS | Performed by: NEUROLOGICAL SURGERY

## 2025-01-01 PROCEDURE — 25810000003 SODIUM CHLORIDE 0.9 % SOLUTION 250 ML FLEX CONT: Performed by: NURSE ANESTHETIST, CERTIFIED REGISTERED

## 2025-01-01 PROCEDURE — 22633 ARTHRD CMBN 1NTRSPC LUMBAR: CPT | Performed by: NEUROLOGICAL SURGERY

## 2025-01-01 PROCEDURE — 22853 INSJ BIOMECHANICAL DEVICE: CPT | Performed by: SPECIALIST/TECHNOLOGIST, OTHER

## 2025-01-01 PROCEDURE — 25010000002 HYDROMORPHONE 1 MG/ML SOLUTION: Performed by: NURSE ANESTHETIST, CERTIFIED REGISTERED

## 2025-01-01 PROCEDURE — 25010000002 CEFAZOLIN PER 500 MG: Performed by: NEUROLOGICAL SURGERY

## 2025-01-01 PROCEDURE — 22842 INSERT SPINE FIXATION DEVICE: CPT | Performed by: NEUROLOGICAL SURGERY

## 2025-01-01 PROCEDURE — 22634 ARTHRD CMBN 1NTRSPC EA ADDL: CPT | Performed by: NEUROLOGICAL SURGERY

## 2025-01-01 PROCEDURE — 22842 INSERT SPINE FIXATION DEVICE: CPT | Performed by: SPECIALIST/TECHNOLOGIST, OTHER

## 2025-01-01 PROCEDURE — 63053 LAM FACTC/FRMT ARTHRD LUM EA: CPT | Performed by: SPECIALIST/TECHNOLOGIST, OTHER

## 2025-01-01 PROCEDURE — 22633 ARTHRD CMBN 1NTRSPC LUMBAR: CPT | Performed by: SPECIALIST/TECHNOLOGIST, OTHER

## 2025-01-01 PROCEDURE — 25010000002 PROPOFOL 10 MG/ML EMULSION: Performed by: NURSE ANESTHETIST, CERTIFIED REGISTERED

## 2025-01-01 PROCEDURE — 5A12012 PERFORMANCE OF CARDIAC OUTPUT, SINGLE, MANUAL: ICD-10-PCS | Performed by: ANESTHESIOLOGY

## 2025-01-01 PROCEDURE — 25010000002 EPINEPHRINE 1 MG/10ML SOLUTION PREFILLED SYRINGE: Performed by: NURSE ANESTHETIST, CERTIFIED REGISTERED

## 2025-01-01 PROCEDURE — 25010000002 LABETALOL 5 MG/ML SOLUTION: Performed by: NURSE ANESTHETIST, CERTIFIED REGISTERED

## 2025-01-01 PROCEDURE — 25810000003 LACTATED RINGERS PER 1000 ML: Performed by: ANESTHESIOLOGY

## 2025-01-01 PROCEDURE — 25010000002 ALBUMIN HUMAN 5% PER 50 ML: Performed by: NURSE ANESTHETIST, CERTIFIED REGISTERED

## 2025-01-01 PROCEDURE — 61783 SCAN PROC SPINAL: CPT | Performed by: NEUROLOGICAL SURGERY

## 2025-01-01 PROCEDURE — 63053 LAM FACTC/FRMT ARTHRD LUM EA: CPT | Performed by: NEUROLOGICAL SURGERY

## 2025-01-01 PROCEDURE — 25810000003 SODIUM CHLORIDE 0.9 % SOLUTION: Performed by: NURSE ANESTHETIST, CERTIFIED REGISTERED

## 2025-01-01 PROCEDURE — 25010000002 PHENYLEPHRINE 10 MG/ML SOLUTION 5 ML VIAL: Performed by: NURSE ANESTHETIST, CERTIFIED REGISTERED

## 2025-01-01 PROCEDURE — P9041 ALBUMIN (HUMAN),5%, 50ML: HCPCS | Performed by: NURSE ANESTHETIST, CERTIFIED REGISTERED

## 2025-01-01 PROCEDURE — 63052 LAM FACETC/FRMT ARTHRD LUM 1: CPT | Performed by: NEUROLOGICAL SURGERY

## 2025-01-01 PROCEDURE — 0SG1071 FUSION OF 2 OR MORE LUMBAR VERTEBRAL JOINTS WITH AUTOLOGOUS TISSUE SUBSTITUTE, POSTERIOR APPROACH, POSTERIOR COLUMN, OPEN APPROACH: ICD-10-PCS | Performed by: NEUROLOGICAL SURGERY

## 2025-01-01 PROCEDURE — 8E0W0CZ ROBOTIC ASSISTED PROCEDURE OF TRUNK REGION, OPEN APPROACH: ICD-10-PCS | Performed by: NEUROLOGICAL SURGERY

## 2025-01-01 PROCEDURE — 76000 FLUOROSCOPY <1 HR PHYS/QHP: CPT

## 2025-01-01 PROCEDURE — 0SG10AJ FUSION OF 2 OR MORE LUMBAR VERTEBRAL JOINTS WITH INTERBODY FUSION DEVICE, POSTERIOR APPROACH, ANTERIOR COLUMN, OPEN APPROACH: ICD-10-PCS | Performed by: NEUROLOGICAL SURGERY

## 2025-01-01 PROCEDURE — 25010000002 LIDOCAINE 2% SOLUTION: Performed by: NURSE ANESTHETIST, CERTIFIED REGISTERED

## 2025-01-01 PROCEDURE — 63052 LAM FACETC/FRMT ARTHRD LUM 1: CPT | Performed by: SPECIALIST/TECHNOLOGIST, OTHER

## 2025-01-01 PROCEDURE — 22853 INSJ BIOMECHANICAL DEVICE: CPT | Performed by: NEUROLOGICAL SURGERY

## 2025-01-01 PROCEDURE — 25010000002 MAGNESIUM SULFATE PER 500 MG OF MAGNESIUM: Performed by: NURSE ANESTHETIST, CERTIFIED REGISTERED

## 2025-01-01 PROCEDURE — 22634 ARTHRD CMBN 1NTRSPC EA ADDL: CPT | Performed by: SPECIALIST/TECHNOLOGIST, OTHER

## 2025-01-01 DEVICE — ALLOGRFT DBM GRAFTON DS INJ FIBR 9CC: Type: IMPLANTABLE DEVICE | Site: SPINE LUMBAR | Status: FUNCTIONAL

## 2025-01-01 DEVICE — SPACER 6068093 CATALYFT PL SHORT 9MM
Type: IMPLANTABLE DEVICE | Site: SPINE LUMBAR | Status: FUNCTIONAL
Brand: CATALYFT PL EXPANDABLE INTERBODY SYSTEM

## 2025-01-01 DEVICE — SET SCREW 5440030 4.75 TI NS BRK OFF
Type: IMPLANTABLE DEVICE | Site: SPINE LUMBAR | Status: FUNCTIONAL
Brand: CD HORIZON® SPINAL SYSTEM

## 2025-01-01 DEVICE — FLOSEAL WITH RECOTHROM - 10ML.
Type: IMPLANTABLE DEVICE | Site: SPINE LUMBAR | Status: FUNCTIONAL
Brand: FLOSEAL HEMOSTATIC MATRIX

## 2025-01-01 DEVICE — PUTTY DBM GRAFTON 6CC: Type: IMPLANTABLE DEVICE | Site: SPINE LUMBAR | Status: FUNCTIONAL

## 2025-01-01 DEVICE — SSC BONE WAX
Type: IMPLANTABLE DEVICE | Site: SPINE LUMBAR | Status: FUNCTIONAL
Brand: SSC BONE WAX

## 2025-01-01 DEVICE — MAGNETOS FLEX MATRIX  10.08CC 0.25-1MM LARGE
Type: IMPLANTABLE DEVICE | Site: SPINE LUMBAR | Status: FUNCTIONAL
Brand: MAGNETOS

## 2025-01-01 DEVICE — HEMOST ABS SURGIFOAM SZ100 8X12 10MM: Type: IMPLANTABLE DEVICE | Site: SPINE LUMBAR | Status: FUNCTIONAL

## 2025-01-01 RX ORDER — DEXMEDETOMIDINE HYDROCHLORIDE 100 UG/ML
INJECTION, SOLUTION INTRAVENOUS AS NEEDED
Status: DISCONTINUED | OUTPATIENT
Start: 2025-01-01 | End: 2025-01-01 | Stop reason: SURG

## 2025-01-01 RX ORDER — FENTANYL CITRATE 50 UG/ML
50 INJECTION, SOLUTION INTRAMUSCULAR; INTRAVENOUS ONCE AS NEEDED
Status: DISCONTINUED | OUTPATIENT
Start: 2025-01-01 | End: 2025-01-01 | Stop reason: HOSPADM

## 2025-01-01 RX ORDER — NALOXONE HCL 0.4 MG/ML
0.2 VIAL (ML) INJECTION AS NEEDED
Status: DISCONTINUED | OUTPATIENT
Start: 2025-01-01 | End: 2025-01-01 | Stop reason: HOSPADM

## 2025-01-01 RX ORDER — DIPHENHYDRAMINE HYDROCHLORIDE 50 MG/ML
12.5 INJECTION, SOLUTION INTRAMUSCULAR; INTRAVENOUS
Status: DISCONTINUED | OUTPATIENT
Start: 2025-01-01 | End: 2025-01-01 | Stop reason: HOSPADM

## 2025-01-01 RX ORDER — IPRATROPIUM BROMIDE AND ALBUTEROL SULFATE 2.5; .5 MG/3ML; MG/3ML
3 SOLUTION RESPIRATORY (INHALATION) ONCE AS NEEDED
Status: DISCONTINUED | OUTPATIENT
Start: 2025-01-01 | End: 2025-01-01 | Stop reason: HOSPADM

## 2025-01-01 RX ORDER — PROMETHAZINE HYDROCHLORIDE 25 MG/1
25 TABLET ORAL ONCE AS NEEDED
Status: DISCONTINUED | OUTPATIENT
Start: 2025-01-01 | End: 2025-01-01 | Stop reason: HOSPADM

## 2025-01-01 RX ORDER — SODIUM CHLORIDE, SODIUM LACTATE, POTASSIUM CHLORIDE, CALCIUM CHLORIDE 600; 310; 30; 20 MG/100ML; MG/100ML; MG/100ML; MG/100ML
9 INJECTION, SOLUTION INTRAVENOUS CONTINUOUS
Status: DISCONTINUED | OUTPATIENT
Start: 2025-01-01 | End: 2025-01-01 | Stop reason: HOSPADM

## 2025-01-01 RX ORDER — PROPOFOL 10 MG/ML
VIAL (ML) INTRAVENOUS AS NEEDED
Status: DISCONTINUED | OUTPATIENT
Start: 2025-01-01 | End: 2025-01-01 | Stop reason: SURG

## 2025-01-01 RX ORDER — FAMOTIDINE 10 MG/ML
20 INJECTION, SOLUTION INTRAVENOUS ONCE
Status: DISCONTINUED | OUTPATIENT
Start: 2025-01-01 | End: 2025-01-01 | Stop reason: HOSPADM

## 2025-01-01 RX ORDER — EPHEDRINE SULFATE 50 MG/ML
INJECTION INTRAVENOUS AS NEEDED
Status: DISCONTINUED | OUTPATIENT
Start: 2025-01-01 | End: 2025-01-01 | Stop reason: SURG

## 2025-01-01 RX ORDER — HYDRALAZINE HYDROCHLORIDE 20 MG/ML
5 INJECTION INTRAMUSCULAR; INTRAVENOUS
Status: DISCONTINUED | OUTPATIENT
Start: 2025-01-01 | End: 2025-01-01 | Stop reason: HOSPADM

## 2025-01-01 RX ORDER — HYDROCODONE BITARTRATE AND ACETAMINOPHEN 5; 325 MG/1; MG/1
1 TABLET ORAL ONCE AS NEEDED
Status: DISCONTINUED | OUTPATIENT
Start: 2025-01-01 | End: 2025-01-01 | Stop reason: HOSPADM

## 2025-01-01 RX ORDER — SODIUM CHLORIDE 9 MG/ML
INJECTION, SOLUTION INTRAVENOUS CONTINUOUS PRN
Status: DISCONTINUED | OUTPATIENT
Start: 2025-01-01 | End: 2025-01-01 | Stop reason: SURG

## 2025-01-01 RX ORDER — ONDANSETRON 2 MG/ML
4 INJECTION INTRAMUSCULAR; INTRAVENOUS ONCE AS NEEDED
Status: DISCONTINUED | OUTPATIENT
Start: 2025-01-01 | End: 2025-01-01 | Stop reason: HOSPADM

## 2025-01-01 RX ORDER — MAGNESIUM SULFATE HEPTAHYDRATE 500 MG/ML
INJECTION, SOLUTION INTRAMUSCULAR; INTRAVENOUS AS NEEDED
Status: DISCONTINUED | OUTPATIENT
Start: 2025-01-01 | End: 2025-01-01 | Stop reason: SURG

## 2025-01-01 RX ORDER — LABETALOL HYDROCHLORIDE 5 MG/ML
INJECTION, SOLUTION INTRAVENOUS AS NEEDED
Status: DISCONTINUED | OUTPATIENT
Start: 2025-01-01 | End: 2025-01-01 | Stop reason: SURG

## 2025-01-01 RX ORDER — LABETALOL HYDROCHLORIDE 5 MG/ML
5 INJECTION, SOLUTION INTRAVENOUS
Status: DISCONTINUED | OUTPATIENT
Start: 2025-01-01 | End: 2025-01-01 | Stop reason: HOSPADM

## 2025-01-01 RX ORDER — HYDROCODONE BITARTRATE AND ACETAMINOPHEN 7.5; 325 MG/1; MG/1
1 TABLET ORAL EVERY 4 HOURS PRN
Status: DISCONTINUED | OUTPATIENT
Start: 2025-01-01 | End: 2025-01-01 | Stop reason: HOSPADM

## 2025-01-01 RX ORDER — FENTANYL CITRATE 50 UG/ML
25 INJECTION, SOLUTION INTRAMUSCULAR; INTRAVENOUS
Status: DISCONTINUED | OUTPATIENT
Start: 2025-01-01 | End: 2025-01-01 | Stop reason: HOSPADM

## 2025-01-01 RX ORDER — ROCURONIUM BROMIDE 10 MG/ML
INJECTION, SOLUTION INTRAVENOUS AS NEEDED
Status: DISCONTINUED | OUTPATIENT
Start: 2025-01-01 | End: 2025-01-01 | Stop reason: SURG

## 2025-01-01 RX ORDER — TRANEXAMIC ACID 100 MG/ML
INJECTION, SOLUTION INTRAVENOUS AS NEEDED
Status: DISCONTINUED | OUTPATIENT
Start: 2025-01-01 | End: 2025-01-01 | Stop reason: SURG

## 2025-01-01 RX ORDER — FLUMAZENIL 0.1 MG/ML
0.2 INJECTION INTRAVENOUS AS NEEDED
Status: DISCONTINUED | OUTPATIENT
Start: 2025-01-01 | End: 2025-01-01 | Stop reason: HOSPADM

## 2025-01-01 RX ORDER — ATROPINE SULFATE 0.4 MG/ML
0.4 INJECTION, SOLUTION INTRAMUSCULAR; INTRAVENOUS; SUBCUTANEOUS ONCE AS NEEDED
Status: DISCONTINUED | OUTPATIENT
Start: 2025-01-01 | End: 2025-01-01 | Stop reason: HOSPADM

## 2025-01-01 RX ORDER — LIDOCAINE HYDROCHLORIDE 20 MG/ML
INJECTION, SOLUTION INFILTRATION; PERINEURAL AS NEEDED
Status: DISCONTINUED | OUTPATIENT
Start: 2025-01-01 | End: 2025-01-01 | Stop reason: SURG

## 2025-01-01 RX ORDER — SODIUM CHLORIDE 0.9 % (FLUSH) 0.9 %
3-10 SYRINGE (ML) INJECTION AS NEEDED
Status: DISCONTINUED | OUTPATIENT
Start: 2025-01-01 | End: 2025-01-01 | Stop reason: HOSPADM

## 2025-01-01 RX ORDER — EPHEDRINE SULFATE 50 MG/ML
5 INJECTION, SOLUTION INTRAVENOUS ONCE AS NEEDED
Status: DISCONTINUED | OUTPATIENT
Start: 2025-01-01 | End: 2025-01-01 | Stop reason: HOSPADM

## 2025-01-01 RX ORDER — HYDROMORPHONE HYDROCHLORIDE 1 MG/ML
0.25 INJECTION, SOLUTION INTRAMUSCULAR; INTRAVENOUS; SUBCUTANEOUS
Status: DISCONTINUED | OUTPATIENT
Start: 2025-01-01 | End: 2025-01-01 | Stop reason: HOSPADM

## 2025-01-01 RX ORDER — MIDAZOLAM HYDROCHLORIDE 1 MG/ML
0.5 INJECTION, SOLUTION INTRAMUSCULAR; INTRAVENOUS
Status: DISCONTINUED | OUTPATIENT
Start: 2025-01-01 | End: 2025-01-01 | Stop reason: HOSPADM

## 2025-01-01 RX ORDER — ALBUMIN HUMAN 50 G/1000ML
SOLUTION INTRAVENOUS CONTINUOUS PRN
Status: DISCONTINUED | OUTPATIENT
Start: 2025-01-01 | End: 2025-01-01 | Stop reason: SURG

## 2025-01-01 RX ORDER — DROPERIDOL 2.5 MG/ML
0.62 INJECTION, SOLUTION INTRAMUSCULAR; INTRAVENOUS
Status: DISCONTINUED | OUTPATIENT
Start: 2025-01-01 | End: 2025-01-01 | Stop reason: HOSPADM

## 2025-01-01 RX ORDER — BUPIVACAINE HYDROCHLORIDE AND EPINEPHRINE 2.5; 5 MG/ML; UG/ML
INJECTION, SOLUTION EPIDURAL; INFILTRATION; INTRACAUDAL; PERINEURAL AS NEEDED
Status: DISCONTINUED | OUTPATIENT
Start: 2025-01-01 | End: 2025-01-01 | Stop reason: HOSPADM

## 2025-01-01 RX ORDER — LIDOCAINE HYDROCHLORIDE 10 MG/ML
0.5 INJECTION, SOLUTION INFILTRATION; PERINEURAL ONCE AS NEEDED
Status: DISCONTINUED | OUTPATIENT
Start: 2025-01-01 | End: 2025-01-01 | Stop reason: HOSPADM

## 2025-01-01 RX ORDER — CALCIUM CHLORIDE 100 MG/ML
INJECTION INTRAVENOUS; INTRAVENTRICULAR AS NEEDED
Status: DISCONTINUED | OUTPATIENT
Start: 2025-01-01 | End: 2025-01-01 | Stop reason: SURG

## 2025-01-01 RX ORDER — SODIUM CHLORIDE 0.9 % (FLUSH) 0.9 %
3 SYRINGE (ML) INJECTION EVERY 12 HOURS SCHEDULED
Status: DISCONTINUED | OUTPATIENT
Start: 2025-01-01 | End: 2025-01-01 | Stop reason: HOSPADM

## 2025-01-01 RX ORDER — PHENYLEPHRINE HCL IN 0.9% NACL 1 MG/10 ML
SYRINGE (ML) INTRAVENOUS AS NEEDED
Status: DISCONTINUED | OUTPATIENT
Start: 2025-01-01 | End: 2025-01-01 | Stop reason: SURG

## 2025-01-01 RX ORDER — PROMETHAZINE HYDROCHLORIDE 25 MG/1
25 SUPPOSITORY RECTAL ONCE AS NEEDED
Status: DISCONTINUED | OUTPATIENT
Start: 2025-01-01 | End: 2025-01-01 | Stop reason: HOSPADM

## 2025-01-01 RX ADMIN — ROCURONIUM BROMIDE 20 MG: 10 INJECTION, SOLUTION INTRAVENOUS at 13:28

## 2025-01-01 RX ADMIN — SODIUM CHLORIDE: 9 INJECTION, SOLUTION INTRAVENOUS at 13:59

## 2025-01-01 RX ADMIN — Medication 200 MCG: at 14:29

## 2025-01-01 RX ADMIN — PHENYLEPHRINE HYDROCHLORIDE 0.5 MCG/KG/MIN: 10 INJECTION, SOLUTION INTRAVENOUS at 08:55

## 2025-01-01 RX ADMIN — ALBUMIN (HUMAN): 12.5 INJECTION, SOLUTION INTRAVENOUS at 08:50

## 2025-01-01 RX ADMIN — TRANEXAMIC ACID 1000 MG: 1 INJECTION, SOLUTION INTRAVENOUS at 13:23

## 2025-01-01 RX ADMIN — DEXMEDETOMIDINE 10 MCG: 100 INJECTION, SOLUTION, CONCENTRATE INTRAVENOUS at 12:31

## 2025-01-01 RX ADMIN — EPINEPHRINE 1 MG: 0.1 INJECTION INTRAVENOUS at 14:42

## 2025-01-01 RX ADMIN — EPINEPHRINE 1 MG: 0.1 INJECTION INTRAVENOUS at 14:45

## 2025-01-01 RX ADMIN — EPINEPHRINE 1 MG: 0.1 INJECTION INTRAVENOUS at 14:50

## 2025-01-01 RX ADMIN — ROCURONIUM BROMIDE 50 MG: 10 INJECTION, SOLUTION INTRAVENOUS at 08:14

## 2025-01-01 RX ADMIN — SODIUM CHLORIDE, POTASSIUM CHLORIDE, SODIUM LACTATE AND CALCIUM CHLORIDE 9 ML/HR: 600; 310; 30; 20 INJECTION, SOLUTION INTRAVENOUS at 07:43

## 2025-01-01 RX ADMIN — ROCURONIUM BROMIDE 10 MG: 10 INJECTION, SOLUTION INTRAVENOUS at 13:58

## 2025-01-01 RX ADMIN — ROCURONIUM BROMIDE 50 MG: 10 INJECTION, SOLUTION INTRAVENOUS at 09:04

## 2025-01-01 RX ADMIN — EPHEDRINE SULFATE 10 MG: 50 INJECTION INTRAVENOUS at 13:16

## 2025-01-01 RX ADMIN — SODIUM CHLORIDE, POTASSIUM CHLORIDE, SODIUM LACTATE AND CALCIUM CHLORIDE: 600; 310; 30; 20 INJECTION, SOLUTION INTRAVENOUS at 10:45

## 2025-01-01 RX ADMIN — EPINEPHRINE 1 MG: 0.1 INJECTION INTRAVENOUS at 14:57

## 2025-01-01 RX ADMIN — ROCURONIUM BROMIDE 20 MG: 10 INJECTION, SOLUTION INTRAVENOUS at 11:08

## 2025-01-01 RX ADMIN — Medication 100 MCG: at 13:51

## 2025-01-01 RX ADMIN — EPHEDRINE SULFATE 5 MG: 50 INJECTION INTRAVENOUS at 12:59

## 2025-01-01 RX ADMIN — CALCIUM CHLORIDE 1 G: 100 INJECTION, SOLUTION INTRAVENOUS at 14:47

## 2025-01-01 RX ADMIN — Medication 20 MG: at 09:07

## 2025-01-01 RX ADMIN — PROPOFOL 30 MCG/KG/MIN: 10 INJECTION, EMULSION INTRAVENOUS at 08:16

## 2025-01-01 RX ADMIN — MAGNESIUM SULFATE HEPTAHYDRATE 1 G: 500 INJECTION, SOLUTION INTRAMUSCULAR; INTRAVENOUS at 08:30

## 2025-01-01 RX ADMIN — Medication 20 MG: at 12:09

## 2025-01-01 RX ADMIN — CEFAZOLIN 2 G: 2 INJECTION, POWDER, LYOPHILIZED, FOR SOLUTION INTRAVENOUS at 12:04

## 2025-01-01 RX ADMIN — LIDOCAINE HYDROCHLORIDE 40 MG: 20 INJECTION, SOLUTION INFILTRATION; PERINEURAL at 08:14

## 2025-01-01 RX ADMIN — ALBUMIN (HUMAN): 12.5 INJECTION, SOLUTION INTRAVENOUS at 08:30

## 2025-01-01 RX ADMIN — EPHEDRINE SULFATE 5 MG: 50 INJECTION INTRAVENOUS at 09:41

## 2025-01-01 RX ADMIN — HYDROMORPHONE HYDROCHLORIDE 1 MG: 1 INJECTION, SOLUTION INTRAMUSCULAR; INTRAVENOUS; SUBCUTANEOUS at 12:09

## 2025-01-01 RX ADMIN — SODIUM CHLORIDE, POTASSIUM CHLORIDE, SODIUM LACTATE AND CALCIUM CHLORIDE: 600; 310; 30; 20 INJECTION, SOLUTION INTRAVENOUS at 13:30

## 2025-01-01 RX ADMIN — CEFAZOLIN 2 G: 2 INJECTION, POWDER, FOR SOLUTION INTRAMUSCULAR; INTRAVENOUS at 08:06

## 2025-01-01 RX ADMIN — EPHEDRINE SULFATE 5 MG: 50 INJECTION INTRAVENOUS at 12:38

## 2025-01-01 RX ADMIN — ATROPINE SULFATE 0.4 MG: 0.4 INJECTION, SOLUTION INTRAVENOUS at 14:52

## 2025-01-01 RX ADMIN — Medication 100 MCG: at 13:24

## 2025-01-01 RX ADMIN — PROPOFOL 150 MG: 10 INJECTION, EMULSION INTRAVENOUS at 08:14

## 2025-01-01 RX ADMIN — ATROPINE SULFATE 0.4 MG: 0.4 INJECTION, SOLUTION INTRAVENOUS at 14:49

## 2025-01-01 RX ADMIN — CALCIUM CHLORIDE 1 G: 100 INJECTION, SOLUTION INTRAVENOUS at 14:41

## 2025-01-01 RX ADMIN — MAGNESIUM SULFATE HEPTAHYDRATE 1 G: 500 INJECTION, SOLUTION INTRAMUSCULAR; INTRAVENOUS at 12:50

## 2025-01-01 RX ADMIN — Medication 10 MG: at 10:00

## 2025-01-01 RX ADMIN — EPHEDRINE SULFATE 10 MG: 50 INJECTION INTRAVENOUS at 14:31

## 2025-01-01 RX ADMIN — LABETALOL HYDROCHLORIDE 5 MG: 5 INJECTION, SOLUTION INTRAVENOUS at 09:11

## 2025-01-01 RX ADMIN — ROCURONIUM BROMIDE 30 MG: 10 INJECTION, SOLUTION INTRAVENOUS at 12:07

## 2025-01-01 RX ADMIN — DEXMEDETOMIDINE 10 MCG: 100 INJECTION, SOLUTION, CONCENTRATE INTRAVENOUS at 08:30

## 2025-01-01 RX ADMIN — EPINEPHRINE 1 MG: 0.1 INJECTION INTRAVENOUS at 14:53

## 2025-01-01 RX ADMIN — EPINEPHRINE 1 MG: 0.1 INJECTION INTRAVENOUS at 14:39

## 2025-01-01 RX ADMIN — DEXMEDETOMIDINE 10 MCG: 100 INJECTION, SOLUTION, CONCENTRATE INTRAVENOUS at 10:30

## 2025-01-07 DIAGNOSIS — Z96.651 STATUS POST TOTAL RIGHT KNEE REPLACEMENT: ICD-10-CM

## 2025-01-07 RX ORDER — HYDROCODONE BITARTRATE AND ACETAMINOPHEN 5; 325 MG/1; MG/1
1 TABLET ORAL EVERY 8 HOURS PRN
Qty: 20 TABLET | Refills: 0 | Status: SHIPPED | OUTPATIENT
Start: 2025-01-07

## 2025-01-07 NOTE — TELEPHONE ENCOUNTER
Spoke with patient she states that she was able to complete her doppler with [facility]. Patient stated that she has been cutting back her Eliquis to one table daily instead of one tablet two times a day. Patient wanted to know if she can get a refill on pain medication. I stated that since she is six weeks out from surgery Dr. Montez may want her to transition over to tylenol and ice but I will ask. Patient has an upcoming appointment with Dr. Reynoso for a consolation for possible back surgery, patient wanted to know if that would interfere with healing from her knee surgery.     Had verbal ok from Dr. Montez to send in one more refill hydrocodone.

## 2025-01-08 DIAGNOSIS — E03.9 ADULT HYPOTHYROIDISM: ICD-10-CM

## 2025-01-09 ENCOUNTER — TELEPHONE (OUTPATIENT)
Dept: ORTHOPEDIC SURGERY | Facility: CLINIC | Age: 80
End: 2025-01-09
Payer: MEDICARE

## 2025-01-09 NOTE — TELEPHONE ENCOUNTER
Rx Refill Note  Requested Prescriptions     Pending Prescriptions Disp Refills    levothyroxine (SYNTHROID, LEVOTHROID) 75 MCG tablet [Pharmacy Med Name: Levothyroxine Sodium 75 MCG Oral Tablet] 90 tablet 3     Sig: TAKE 1 TABLET BY MOUTH DAILY      Last office visit with prescribing clinician: 12/2/2024   Last telemedicine visit with prescribing clinician: Visit date not found   Next office visit with prescribing clinician: Visit date not found                         Would you like a call back once the refill request has been completed: [] Yes [] No    If the office needs to give you a call back, can they leave a voicemail: [] Yes [] No    Aleisha Ji MA  01/09/25, 11:20 EST

## 2025-01-09 NOTE — TELEPHONE ENCOUNTER
Caller: Lou Shaffer    Relationship: Self    Best call back number: [phone]    What is the best time to reach you: ANY     Who are you requesting to speak with (clinical staff, provider,  specific staff member): PROVIDER OR CLINICAL     Do you know the name of the person who called: YES     What was the call regarding: PATIENT IS NEEDING TO KNOW IF SHE NEEDS TO CONTINUE THE apixaban (ELIQUIS) 5 MG tablet tablet

## 2025-01-09 NOTE — TELEPHONE ENCOUNTER
Please review and advise   [phone]    Sx date 11/08/2024 Rt TKA  Last FD 12/23/2024  Last OV 12/19/2024

## 2025-01-09 NOTE — TELEPHONE ENCOUNTER
Spoke with patient to go over ultrasound doppler results, per Dr. Montez her doppler was negative and patient can discontinue the Eliquis.

## 2025-01-10 RX ORDER — LEVOTHYROXINE SODIUM 75 UG/1
75 TABLET ORAL DAILY
Qty: 90 TABLET | Refills: 3 | Status: SHIPPED | OUTPATIENT
Start: 2025-01-10

## 2025-01-28 DIAGNOSIS — G25.81 RESTLESS LEG SYNDROME: ICD-10-CM

## 2025-01-29 ENCOUNTER — OFFICE VISIT (OUTPATIENT)
Dept: FAMILY MEDICINE CLINIC | Facility: CLINIC | Age: 80
End: 2025-01-29
Payer: MEDICARE

## 2025-01-29 ENCOUNTER — TELEPHONE (OUTPATIENT)
Dept: FAMILY MEDICINE CLINIC | Facility: CLINIC | Age: 80
End: 2025-01-29

## 2025-01-29 VITALS
HEIGHT: 60 IN | BODY MASS INDEX: 24.74 KG/M2 | OXYGEN SATURATION: 97 % | SYSTOLIC BLOOD PRESSURE: 120 MMHG | WEIGHT: 126 LBS | HEART RATE: 73 BPM | DIASTOLIC BLOOD PRESSURE: 98 MMHG

## 2025-01-29 DIAGNOSIS — I10 PRIMARY HYPERTENSION: Primary | ICD-10-CM

## 2025-01-29 PROCEDURE — G2211 COMPLEX E/M VISIT ADD ON: HCPCS | Performed by: FAMILY MEDICINE

## 2025-01-29 PROCEDURE — 1125F AMNT PAIN NOTED PAIN PRSNT: CPT | Performed by: FAMILY MEDICINE

## 2025-01-29 PROCEDURE — 99213 OFFICE O/P EST LOW 20 MIN: CPT | Performed by: FAMILY MEDICINE

## 2025-01-29 PROCEDURE — 3080F DIAST BP >= 90 MM HG: CPT | Performed by: FAMILY MEDICINE

## 2025-01-29 PROCEDURE — 3074F SYST BP LT 130 MM HG: CPT | Performed by: FAMILY MEDICINE

## 2025-01-29 RX ORDER — BENAZEPRIL HYDROCHLORIDE 5 MG/1
5 TABLET ORAL
Qty: 90 TABLET | Refills: 3 | Status: SHIPPED | OUTPATIENT
Start: 2025-01-29

## 2025-01-29 NOTE — PROGRESS NOTES
"Chief Complaint  Chief Complaint   Patient presents with    Hypertension     Pt here to request medication change        Subjective    History of Present Illness        Lou Shaffer presents to [facility] for   Hypertension  This is a chronic problem. The current episode started more than 1 year ago. The problem has been stable since onset. The problem is controlled. Pertinent negatives include no anxiety, blurred vision, chest pain, neck pain, orthopnea, peripheral edema or PND. Risk factors for coronary artery disease include post-menopausal state and family history. The current treatment provides significant improvement. There are no compliance problems.  There is no history of coarctation of the aorta, hyperaldosteronism, hypercortisolism, pheochromocytoma, renovascular disease or sleep apnea.      History of Present Illness      Objective   Vital Signs:   Visit Vitals  /98   Pulse 73   Ht 152.4 cm (60\")   Wt 57.2 kg (126 lb)   LMP  (LMP Unknown)   SpO2 97%   BMI 24.61 kg/m²          BMI is within normal parameters. No other follow-up for BMI required.     Physical Exam  Vitals reviewed.   Constitutional:       Appearance: She is well-developed.   HENT:      Head: Normocephalic.      Right Ear: External ear normal.      Left Ear: External ear normal.      Nose: Nose normal.   Eyes:      Conjunctiva/sclera: Conjunctivae normal.   Cardiovascular:      Rate and Rhythm: Normal rate and regular rhythm.   Pulmonary:      Effort: Pulmonary effort is normal.      Breath sounds: Normal breath sounds.   Musculoskeletal:         General: Normal range of motion.      Cervical back: Normal range of motion and neck supple.   Skin:     General: Skin is warm and dry.      Capillary Refill: Capillary refill takes less than 2 seconds.   Neurological:      Mental Status: She is alert and oriented to person, place, and time.        Physical Exam           Result Review :  Results          "                   Assessment and Plan      Diagnoses and all orders for this visit:    1. Primary hypertension (Primary)  Assessment & Plan:  Hypertension is stable and controlled  Medication changes per orders.  Dietary sodium restriction.  Weight loss.  Regular aerobic exercise.  HCTZ was taken out of the combination tablet due to its side effects.   Blood pressure will be reassessed in 3 months.    Orders:  -     benazepril (LOTENSIN) 5 MG tablet; Take 1 tablet by mouth every night at bedtime. Indications: High Blood Pressure  Dispense: 90 tablet; Refill: 3       Assessment & Plan             Follow Up   No follow-ups on file.  Patient was given instructions and counseling regarding her condition or for health maintenance advice. Please see specific information pulled into the AVS if appropriate.

## 2025-01-29 NOTE — TELEPHONE ENCOUNTER
Caller: Lou Shaffer    Relationship: Self    Best call back number: [phone]    Which medication are you concerned about: benazepril-hydrochlorthiazide (LOTENSIN HCT) 5-6.25 MG per tablet     Who prescribed you this medication: Juan C Parish Sr., MD      When did you start taking this medication:     What are your concerns: PATIENT THINKS THE HCT IS MAKING HER DEHYDRATED AND IS GOING TO START WEANING HERSELF OFF OF THE MEDICATION. PLEASE CALL AND ADVISE.    How long have you had these concerns: ABOUT A WEEK OR TWO AGO

## 2025-01-29 NOTE — TELEPHONE ENCOUNTER
Caller: Lou Shaffer    Relationship: Self    Best call back number:   Telephone Information:   Mobile [phone]     What is the medical concern/diagnosis: BACK PAIN- ALREADY KNOWN BY PROVIDER AND PREVIOUS REFERRAL PLACED    What specialty or service is being requested: NEUROSURGERY    Any additional details: PATIENT HAD A REFERRAL FOR HER BACK PAIN TO DR OLMEDO FOR NEUROSURGERY BUT THEY COULD NOT GET HER IN UNTIL APRIL 2025. PATIENT STATED THAT SHE DOES NOT WANT TO WAIT THAT LONG ESPECIALLY SINCE SHE IS STILL HAVING THE BAD BACK PAIN SO IS WANTING TO KNOW IF DR CHRISTIANSON COULD REFER HER SOMEWHERE ELSE THAT COULD GET HER IN SOONER. PLEASE COMMUNICATE ONCE NEW REFERRAL PLACED.

## 2025-01-30 RX ORDER — BACLOFEN 10 MG/1
10 TABLET ORAL 2 TIMES DAILY
Qty: 180 TABLET | Refills: 3 | Status: SHIPPED | OUTPATIENT
Start: 2025-01-30

## 2025-01-30 NOTE — TELEPHONE ENCOUNTER
Rx Refill Note  Requested Prescriptions     Pending Prescriptions Disp Refills    baclofen (LIORESAL) 10 MG tablet [Pharmacy Med Name: Baclofen 10 MG Oral Tablet] 180 tablet 1     Sig: TAKE 1 TABLET BY MOUTH TWICE  DAILY      Last office visit with prescribing clinician: 12/2/2024   Last telemedicine visit with prescribing clinician: Visit date not found   Next office visit with prescribing clinician: 1/29/2025                         Would you like a call back once the refill request has been completed: [] Yes [] No    If the office needs to give you a call back, can they leave a voicemail: [] Yes [] No    Donny Rome MA  01/30/25, 08:22 EST

## 2025-01-30 NOTE — ASSESSMENT & PLAN NOTE
Hypertension is stable and controlled  Medication changes per orders.  Dietary sodium restriction.  Weight loss.  Regular aerobic exercise.  HCTZ was taken out of the combination tablet due to its side effects.   Blood pressure will be reassessed in 3 months.

## 2025-02-04 NOTE — TELEPHONE ENCOUNTER
Hub staff attempted to follow warm transfer process and was unsuccessful     Caller: Lou Shaffer    Relationship to patient: Self    Best call back number:[phone]      Patient is needing: SHE WAS CALLING BACK TO CHECK THE STATUS OF THE REFERRAL AS SHE HASN'T HEARD ANYTHING YET.  PLEASE CALL AND ADVISE.

## 2025-03-04 ENCOUNTER — OFFICE VISIT (OUTPATIENT)
Dept: ORTHOPEDIC SURGERY | Facility: CLINIC | Age: 80
End: 2025-03-04
Payer: MEDICARE

## 2025-03-04 VITALS — WEIGHT: 122.3 LBS | TEMPERATURE: 98 F | BODY MASS INDEX: 24.01 KG/M2 | HEIGHT: 60 IN

## 2025-03-04 DIAGNOSIS — Z96.651 STATUS POST TOTAL RIGHT KNEE REPLACEMENT: Primary | ICD-10-CM

## 2025-03-04 RX ORDER — CEPHALEXIN 500 MG/1
CAPSULE ORAL
Qty: 4 CAPSULE | Refills: 1 | Status: SHIPPED | OUTPATIENT
Start: 2025-03-04

## 2025-03-04 NOTE — PROGRESS NOTES
Patient: Lou Shaffer  YOB: 1945 79 y.o. female  Medical Record Number: [id number]    Chief Complaints:   Chief Complaint   Patient presents with    Right Knee - Pain, Follow-up       History of Present Illness:Lou Shaffer is a 79 y.o. female who presents for follow-up of right total knee.  Now 4 months out.  Doing well.  No complaints.    Allergies:   Allergies   Allergen Reactions    Meperidine Nausea And Vomiting       Medications:   Current Outpatient Medications   Medication Sig Dispense Refill    acetaminophen (TYLENOL) 500 MG tablet Take 1 tablet by mouth Every 6 (Six) Hours As Needed for Mild Pain. Indications: Pain      ALPRAZolam (XANAX) 0.5 MG tablet Take 1 tablet by mouth Daily As Needed for Anxiety. Indications: Feeling Anxious 45 tablet 2    apixaban (ELIQUIS) 5 MG tablet tablet Take 1 tablet by mouth 2 (Two) Times a Day. Indications: DVT/PE (active thrombosis) 60 tablet 1    baclofen (LIORESAL) 10 MG tablet TAKE 1 TABLET BY MOUTH TWICE  DAILY 180 tablet 3    benazepril (LOTENSIN) 5 MG tablet Take 1 tablet by mouth every night at bedtime. Indications: High Blood Pressure 90 tablet 3    Budesonide (ENTOCORT EC) 3 MG 24 hr capsule Take 1 capsule by mouth Daily. Indications: Collagenous Colitis      Cholecalciferol (VITAMIN D-3) 1000 UNITS capsule Take 1,000 Units by mouth Daily. TO HOLD 1 WEEK BEFORE SURGERY    Indications: Vitamin D Deficiency      docusate sodium (Colace) 100 MG capsule Take 1 capsule by mouth 2 (Two) Times a Day. (Patient taking differently: Take 1 capsule by mouth 2 (Two) Times a Day.) 60 capsule 0    fenofibrate 160 MG tablet Take 1 tablet by mouth Daily. 90 tablet 3    HYDROcodone-acetaminophen (NORCO) 5-325 MG per tablet Take 1 tablet by mouth Every 6 (Six) Hours As Needed for Severe Pain. 30 tablet 0    HYDROcodone-acetaminophen (Norco) 5-325 MG per tablet Take 1 tablet by mouth Every 8 (Eight) Hours As Needed for Mild Pain or Moderate Pain. 20 tablet  "0    levothyroxine (SYNTHROID, LEVOTHROID) 75 MCG tablet TAKE 1 TABLET BY MOUTH DAILY 90 tablet 3    magnesium oxide (MAG-OX) 400 MG tablet Take 1 tablet by mouth 2 (two) times a day. TO HOLD 1 WEEK BEFORE SURGERY      meclizine (ANTIVERT) 25 MG tablet Take 1 tablet by mouth 4 (Four) Times a Day. (Patient taking differently: Take 1 tablet by mouth 3 (Three) Times a Day As Needed.) 40 tablet 1    meloxicam (MOBIC) 7.5 MG tablet Take 1 tablet by mouth Daily with food. (Patient taking differently: Take 1 tablet by mouth Daily.) 7 tablet 0    ondansetron (Zofran) 4 MG tablet Take 1 tablet by mouth Every 8 (Eight) Hours As Needed for Nausea or Vomiting for up to 10 doses. Indications: Nausea and Vomiting Following an Operation 10 tablet 0    pantoprazole (PROTONIX) 40 MG EC tablet TAKE 1 TABLET BY MOUTH TWICE  DAILY 180 tablet 3     No current facility-administered medications for this visit.         The following portions of the patient's history were reviewed and updated as appropriate: allergies, current medications, past family history, past medical history, past social history, past surgical history and problem list.    Review of Systems:   A 14 point review of systems was performed. All systems negative except pertinent positives/negative listed in HPI above    Physical Exam:   Vitals:    03/04/25 1343   Temp: 98 °F (36.7 °C)   Weight: 55.5 kg (122 lb 4.8 oz)   Height: 152.4 cm (60\")   PainSc: 3    PainLoc: Knee       General: A and O x 3, ASA, NAD    SCLERA:    Normal    DENTITION:   Normal  Right knee examined no tenderness to palpation.  Range of motion 3 degrees to 120 degrees.  Knee stable varus valgus stress.  No flexion stability noted.  Motor and sensory intact distally.    Radiology: 3 views right knee AP, lateral and sunrise taken and reviewed imaging shows status post total knee replacement no evidence of loosening or subsidence.  No acute complications.  No significant changes from previous " films.        Assessment/Plan:  4-month status post right total knee replacement    Patient is progressing well.  Encouraged her to continue home therapy exercises and working on getting the rest that extension she is just a few degrees shy of that.  We also send in some antibiotics for upcoming dental appointment.  Will plan to see her back at the 1 year anniversary of surgery if there is any change in interim she will let us know.  All questions answered.

## 2025-03-07 ENCOUNTER — OFFICE VISIT (OUTPATIENT)
Dept: FAMILY MEDICINE CLINIC | Facility: CLINIC | Age: 80
End: 2025-03-07
Payer: MEDICARE

## 2025-03-07 VITALS
HEIGHT: 60 IN | SYSTOLIC BLOOD PRESSURE: 122 MMHG | HEART RATE: 77 BPM | OXYGEN SATURATION: 97 % | BODY MASS INDEX: 24.54 KG/M2 | WEIGHT: 125 LBS | RESPIRATION RATE: 17 BRPM | DIASTOLIC BLOOD PRESSURE: 80 MMHG

## 2025-03-07 DIAGNOSIS — Z51.81 ENCOUNTER FOR THERAPEUTIC DRUG LEVEL MONITORING: ICD-10-CM

## 2025-03-07 DIAGNOSIS — E03.9 ADULT HYPOTHYROIDISM: ICD-10-CM

## 2025-03-07 DIAGNOSIS — I10 PRIMARY HYPERTENSION: ICD-10-CM

## 2025-03-07 DIAGNOSIS — F41.9 ANXIETY: Primary | ICD-10-CM

## 2025-03-07 NOTE — PROGRESS NOTES
"Chief Complaint  Chief Complaint   Patient presents with    Anxiety     3 mon f/u on meds        Subjective    History of Present Illness        Lou Shaffer presents to [facility] for   Anxiety  Presents for follow-up visit.  Symptoms include nervous/anxious behavior.  Patient reports no dizziness, dry mouth, feeling of choking, hyperventilation, irritability, muscle tension, palpitations or shortness of breath. The severity of symptoms is moderate. Past treatments include benzodiazephines. The treatment provided significant relief. Compliance with medications is %.      History of Present Illness      Objective   Vital Signs:   Visit Vitals  /80   Pulse 77   Resp 17   Ht 152.4 cm (60\")   Wt 56.7 kg (125 lb)   LMP  (LMP Unknown)   SpO2 97%   BMI 24.41 kg/m²          BMI is within normal parameters. No other follow-up for BMI required.     Physical Exam  Vitals reviewed.   Constitutional:       Appearance: She is well-developed.   HENT:      Head: Normocephalic.      Right Ear: External ear normal.      Left Ear: External ear normal.      Nose: Nose normal.   Eyes:      Conjunctiva/sclera: Conjunctivae normal.   Cardiovascular:      Rate and Rhythm: Normal rate and regular rhythm.   Pulmonary:      Effort: Pulmonary effort is normal.      Breath sounds: Normal breath sounds.   Musculoskeletal:         General: Normal range of motion.      Cervical back: Normal range of motion and neck supple.   Skin:     General: Skin is warm and dry.      Capillary Refill: Capillary refill takes less than 2 seconds.   Neurological:      Mental Status: She is alert and oriented to person, place, and time.        Physical Exam           Result Review :  Results                            Assessment and Plan      Diagnoses and all orders for this visit:    1. Anxiety (Primary)  Assessment & Plan:  Stable.  No change in treatment at this time.  Patient is encouraged return to clinic in 3 " months for follow-up.  Will    Orders:  -     ToxAssure Flex 15, Ur -    2. Encounter for therapeutic drug level monitoring  -     ToxAssure Flex 15, Ur -    3. Primary hypertension  -     CBC & Differential  -     Comprehensive Metabolic Panel  -     Lipid Panel With / Chol / HDL Ratio    4. Adult hypothyroidism  -     T3  -     T3, Free  -     T4  -     T4, Free  -     TSH    Other orders  -     Manual Differential       Assessment & Plan             Follow Up   No follow-ups on file.  Patient was given instructions and counseling regarding her condition or for health maintenance advice. Please see specific information pulled into the AVS if appropriate.

## 2025-03-10 LAB
ALBUMIN SERPL-MCNC: 4.4 G/DL (ref 3.5–5.2)
ALBUMIN/GLOB SERPL: 2.1 G/DL
ALP SERPL-CCNC: 43 U/L (ref 39–117)
ALT SERPL-CCNC: 9 U/L (ref 1–33)
AST SERPL-CCNC: 15 U/L (ref 1–32)
BILIRUB SERPL-MCNC: 0.3 MG/DL (ref 0–1.2)
BUN SERPL-MCNC: 19 MG/DL (ref 8–23)
BUN/CREAT SERPL: 17.9 (ref 7–25)
CALCIUM SERPL-MCNC: 9.8 MG/DL (ref 8.6–10.5)
CHLORIDE SERPL-SCNC: 102 MMOL/L (ref 98–107)
CHOLEST SERPL-MCNC: 132 MG/DL (ref 0–200)
CHOLEST/HDLC SERPL: 2.24 {RATIO}
CO2 SERPL-SCNC: 27.9 MMOL/L (ref 22–29)
CREAT SERPL-MCNC: 1.06 MG/DL (ref 0.57–1)
DIFFERENTIAL COMMENT: ABNORMAL
EGFRCR SERPLBLD CKD-EPI 2021: 53.2 ML/MIN/1.73
EOSINOPHIL # BLD MANUAL: 0.13 10*3/MM3 (ref 0–0.4)
EOSINOPHIL NFR BLD MANUAL: 2.1 % (ref 0.3–6.2)
ERYTHROCYTE [DISTWIDTH] IN BLOOD BY AUTOMATED COUNT: 15.8 % (ref 12.3–15.4)
GLOBULIN SER CALC-MCNC: 2.1 GM/DL
GLUCOSE SERPL-MCNC: 90 MG/DL (ref 65–99)
HCT VFR BLD AUTO: 25.3 % (ref 34–46.6)
HDLC SERPL-MCNC: 59 MG/DL (ref 40–60)
HGB BLD-MCNC: 7.1 G/DL (ref 12–15.9)
LDLC SERPL CALC-MCNC: 50 MG/DL (ref 0–100)
LYMPHOCYTES # BLD MANUAL: 0.57 10*3/MM3 (ref 0.7–3.1)
LYMPHOCYTES NFR BLD MANUAL: 9.5 % (ref 19.6–45.3)
MCH RBC QN AUTO: 20.2 PG (ref 26.6–33)
MCHC RBC AUTO-ENTMCNC: 28.1 G/DL (ref 31.5–35.7)
MCV RBC AUTO: 71.9 FL (ref 79–97)
MONOCYTES # BLD MANUAL: 0.25 10*3/MM3 (ref 0.1–0.9)
MONOCYTES NFR BLD MANUAL: 4.2 % (ref 5–12)
NEUTROPHILS # BLD MANUAL: 5.09 10*3/MM3 (ref 1.7–7)
NEUTROPHILS NFR BLD MANUAL: 84.2 % (ref 42.7–76)
NRBC BLD AUTO-RTO: 0 /100 WBC (ref 0–0.2)
PLATELET # BLD AUTO: 399 10*3/MM3 (ref 140–450)
PLATELET BLD QL SMEAR: ABNORMAL
POTASSIUM SERPL-SCNC: 4.4 MMOL/L (ref 3.5–5.2)
PROT SERPL-MCNC: 6.5 G/DL (ref 6–8.5)
RBC # BLD AUTO: 3.52 10*6/MM3 (ref 3.77–5.28)
RBC MORPH BLD: ABNORMAL
SODIUM SERPL-SCNC: 140 MMOL/L (ref 136–145)
T3 SERPL-MCNC: 72.4 NG/DL (ref 80–200)
T3FREE SERPL-MCNC: 2.3 PG/ML (ref 2–4.4)
T4 FREE SERPL-MCNC: 1.2 NG/DL (ref 0.92–1.68)
T4 SERPL-MCNC: 7.92 MCG/DL (ref 4.5–11.7)
TRIGL SERPL-MCNC: 136 MG/DL (ref 0–150)
TSH SERPL DL<=0.005 MIU/L-ACNC: 7.43 UIU/ML (ref 0.27–4.2)
VLDLC SERPL CALC-MCNC: 23 MG/DL (ref 5–40)
WBC # BLD AUTO: 6.04 10*3/MM3 (ref 3.4–10.8)

## 2025-03-19 ENCOUNTER — RESULTS FOLLOW-UP (OUTPATIENT)
Dept: FAMILY MEDICINE CLINIC | Facility: CLINIC | Age: 80
End: 2025-03-19
Payer: MEDICARE

## 2025-03-19 NOTE — TELEPHONE ENCOUNTER
Pt returned a call. Wanted to let Dr. Parish know that she has been feeling a little more tired than usual. Pt stated she thought it was just age related on why she was feeling tired. Pt would like to get blood checked. Please advise.

## 2025-03-19 NOTE — PROGRESS NOTES
I called and spoke with pt, pt states she feels fine . Please advise on if pt needs to go to the hospital

## 2025-03-24 NOTE — ASSESSMENT & PLAN NOTE
Stable.  No change in treatment at this time.  Patient is encouraged return to clinic in 3 months for follow-up.  Will

## 2025-04-01 DIAGNOSIS — D64.9 ANEMIA, UNSPECIFIED TYPE: Primary | ICD-10-CM

## 2025-04-01 NOTE — PROGRESS NOTES
Subjective   Patient ID: Lou Shaffer is a 79 y.o. female is being seen for consultation today at the request of Juan C Parish Sr., MD Chronic bilateral low back pain with bilateral sciatica MRI-LUMBAR WO 09.04.24 [facility]     History of Present Illness    This very nice lady was seen by me in 2010 for neck issues.  She was operated on by Dr. Malik in 2008 for what sounded like a right L4-5 herniated disc.  She is retired although she used to be in  and was on her feet all day.  She is generally healthy and used to run up until recently but over the last 2 or 3 years she has begun developing bilateral buttock and radiating leg pain consistent with sciatica neurogenic claudication.  Some but not a lot of low back pain.  She has no weakness.  But her quality of life has declined because of her inability to stand and walk for very long.  Sitting and lying down are fine.  She has worked with Dr. Ireland past and had gotten epidural blocks every 3 to 4 months.  Their effectiveness has declined to the extent that the last set did not work at all.  She is not taking any medicines.  She is not taking any blood thinners.  She is not a smoker.  She has severe stenosis at multiple levels from L2-L3 all the way to L5-S1 but I think she is probably most symptomatic at L4-L5 and L5-S1, possibly L3-L4.  She has a syrinx in her lower thoracic region.  I do not think that is causing a problem but we should characterize it fully and get a thoracic MRI.  Will get flexion-extension films to make sure she does not have any instability.  Will try some gabapentin at 100 mg twice daily.  But I think were probably heading toward surgery.  In the absence of a lot of back pain and if she has no instability then I would just do a revision decompression from L3-S1 or L4-S1 depending upon the flexion-extension x-ray results.  Will discuss at next time after she comes back after having further imaging    The following portions  of the patient's history were reviewed and updated as appropriate: allergies, current medications, past family history, past medical history, past social history, past surgical history, and problem list.    Review of Systems   All other systems reviewed and are negative.      Objective   Physical Exam  Constitutional:       General: She is awake.      Appearance: She is well-developed.   HENT:      Head: Normocephalic and atraumatic.   Eyes:      General: Lids are normal.      Extraocular Movements: Extraocular movements intact.      Conjunctiva/sclera: Conjunctivae normal.      Pupils: Pupils are equal, round, and reactive to light.   Neck:      Vascular: No carotid bruit.   Neurological:      Mental Status: She is alert.      Coordination: Coordination is intact.      Deep Tendon Reflexes:      Reflex Scores:       Tricep reflexes are 2+ on the right side and 2+ on the left side.       Bicep reflexes are 2+ on the right side and 2+ on the left side.       Brachioradialis reflexes are 2+ on the right side and 2+ on the left side.       Patellar reflexes are 2+ on the right side and 2+ on the left side.       Achilles reflexes are 2+ on the right side and 2+ on the left side.  Psychiatric:         Speech: Speech normal.       Neurological Exam  Mental Status  Awake and alert. Oriented only to person, place, time and situation. Recent and remote memory are intact. Speech is normal. Language is fluent with no aphasia. Attention and concentration are normal. Fund of knowledge is appropriate for level of education.    Cranial Nerves  CN II: Visual acuity is normal. Visual fields full to confrontation.  CN III, IV, VI: Extraocular movements intact bilaterally. Normal lids and orbits bilaterally. Pupils equal round and reactive to light bilaterally.  CN V: Facial sensation is normal.  CN VII: Full and symmetric facial movement.  CN IX, X: Palate elevates symmetrically. Normal gag reflex.  CN XI: Shoulder shrug strength is  normal.  CN XII: Tongue midline without atrophy or fasciculations.    Motor  Normal muscle bulk throughout. Normal muscle tone.                                               Right                     Left  Rhomboids                            5                          5  Infraspinatus                          5                          5  Supraspinatus                       5                          5  Deltoid                                   5                          5   Biceps                                   5                          5  Brachioradialis                      5                          5   Triceps                                  5                          5   Pronator                                5                          5   Supinator                              5                           5   Wrist flexor                            5                          5   Wrist extensor                       5                          5   Finger flexor                          5                          5   Finger extensor                     5                          5   Interossei                              5                          5   Abductor pollicis brevis         5                          5   Flexor pollicis brevis             5                          5   Opponens pollicis                 5                          5  Extensor digitorum               5                          5  Abductor digiti minimi           5                          5   Abdominal                            5                          5  Glutei                                    5                          5  Hip abductor                         5                          5  Hip adductor                         5                          5   Iliopsoas                               5                          5   Quadriceps                           5                          5   Hamstring                              5                          5   Gastrocnemius                     5                           5   Anterior tibialis                      5                          5   Posterior tibialis                    5                          5   Peroneal                               5                          5  Ankle dorsiflexor                   5                          5  Ankle plantar flexor              5                           5  Extensor hallucis longus      5                           5    Sensory  Light touch is normal in upper and lower extremities. Proprioception is normal in upper and lower extremities.     Reflexes                                            Right                      Left  Brachioradialis                    2+                         2+  Biceps                                 2+                         2+  Triceps                                2+                         2+  Finger flex                           2+                         2+  Hamstring                            2+                         2+  Patellar                                2+                         2+  Achilles                                2+                         2+    Coordination    Finger-to-nose, rapid alternating movements and heel-to-shin normal bilaterally without dysmetria.    Gait  Casual gait is normal including stance, stride, and arm swing.Normal toe walking. Normal heel walking. Normal tandem gait.       Assessment & Plan   Independent Review of Radiographic Studies:      The lumbar MRI done on 9//24 shows spondylolisthesis at L4-L5 with severe stenosis at that level, more moderate stenosis at L2-L3 postoperative changes at L4-L5 and severe recurrent stenosis, and severe bilateral recess stenosis at L5-S1.  There is also a syrinx in the upper lumbar region that seems to originate in the thoracic spine, the cephalad part at about T10.    Medical Decision Making:      We are likely heading  toward surgery but we do need to get a thoracic MRI to fully assess the extent of her syrinx.  Will start her on gabapentin at 100 mg twice daily and see if that helps in any way and flexion-extension films will be needed to see if she needs to be fused because of instability although I doubt it.  Will discuss surgical intervention next time around.    Diagnoses and all orders for this visit:    1. Spondylolisthesis at L3-L4 level (Primary)  -     XR Spine Lumbar Complete With Flex & Ext; Future  -     MRI Thoracic Spine Without Contrast; Future  -     gabapentin (NEURONTIN) 100 MG capsule; Take 1 capsule p.o. nightly x 7 days then twice daily  Dispense: 60 capsule; Refill: 5    2. Spinal stenosis of lumbar region with neurogenic claudication  -     XR Spine Lumbar Complete With Flex & Ext; Future  -     MRI Thoracic Spine Without Contrast; Future  -     gabapentin (NEURONTIN) 100 MG capsule; Take 1 capsule p.o. nightly x 7 days then twice daily  Dispense: 60 capsule; Refill: 5    3. History of lumbar surgery  -     XR Spine Lumbar Complete With Flex & Ext; Future  -     MRI Thoracic Spine Without Contrast; Future  -     gabapentin (NEURONTIN) 100 MG capsule; Take 1 capsule p.o. nightly x 7 days then twice daily  Dispense: 60 capsule; Refill: 5    4. Syringomyelia and syringobulbia      Return in about 4 weeks (around 4/30/2025) for After MRI and x-ray.

## 2025-04-02 ENCOUNTER — OFFICE VISIT (OUTPATIENT)
Dept: NEUROSURGERY | Facility: CLINIC | Age: 80
End: 2025-04-02
Payer: MEDICARE

## 2025-04-02 VITALS — DIASTOLIC BLOOD PRESSURE: 90 MMHG | HEART RATE: 76 BPM | OXYGEN SATURATION: 97 % | SYSTOLIC BLOOD PRESSURE: 142 MMHG

## 2025-04-02 DIAGNOSIS — D64.9 ANEMIA, UNSPECIFIED TYPE: ICD-10-CM

## 2025-04-02 DIAGNOSIS — M48.062 SPINAL STENOSIS OF LUMBAR REGION WITH NEUROGENIC CLAUDICATION: ICD-10-CM

## 2025-04-02 DIAGNOSIS — G95.0 SYRINGOMYELIA AND SYRINGOBULBIA: ICD-10-CM

## 2025-04-02 DIAGNOSIS — Z98.890 HISTORY OF LUMBAR SURGERY: ICD-10-CM

## 2025-04-02 DIAGNOSIS — M43.16 SPONDYLOLISTHESIS AT L3-L4 LEVEL: Primary | ICD-10-CM

## 2025-04-02 RX ORDER — GABAPENTIN 100 MG/1
CAPSULE ORAL
Qty: 60 CAPSULE | Refills: 5 | Status: SHIPPED | OUTPATIENT
Start: 2025-04-02

## 2025-04-02 NOTE — LETTER
April 2, 2025     Juan C Parish Sr., MD  [address]  [address]  [facility] [address]    Patient: Lou Shaffer   YOB: 1945   Date of Visit: 4/2/2025     Dear Juan C Parish Sr., MD:       Thank you for referring Lou Shaffer to me for evaluation. Below are the relevant portions of my assessment and plan of care.    If you have questions, please do not hesitate to call me. I look forward to following Lou along with you.         Sincerely,        León Madrid MD        CC: No Recipients    León Madrid MD  04/02/25 1341  Sign when Signing Visit  Subjective  Patient ID: Lou Shaffer is a 79 y.o. female is being seen for consultation today at the request of Juan C Parish Sr., MD Chronic bilateral low back pain with bilateral sciatica MRI-LUMBAR WO 09.04.24  MID     History of Present Illness    This very nice lady was seen by me in 2010 for neck issues.  She was operated on by Dr. Malik in 2008 for what sounded like a right L4-5 herniated disc.  She is retired although she used to be in  and was on her feet all day.  She is generally healthy and used to run up until recently but over the last 2 or 3 years she has begun developing bilateral buttock and radiating leg pain consistent with sciatica neurogenic claudication.  Some but not a lot of low back pain.  She has no weakness.  But her quality of life has declined because of her inability to stand and walk for very long.  Sitting and lying down are fine.  She has worked with Dr. Ireland past and had gotten epidural blocks every 3 to 4 months.  Their effectiveness has declined to the extent that the last set did not work at all.  She is not taking any medicines.  She is not taking any blood thinners.  She is not a smoker.  She has severe stenosis at multiple levels from L2-L3 all the way to L5-S1 but I think she is probably most symptomatic at L4-L5 and L5-S1, possibly L3-L4.  She has a syrinx in her  lower thoracic region.  I do not think that is causing a problem but we should characterize it fully and get a thoracic MRI.  Will get flexion-extension films to make sure she does not have any instability.  Will try some gabapentin at 100 mg twice daily.  But I think were probably heading toward surgery.  In the absence of a lot of back pain and if she has no instability then I would just do a revision decompression from L3-S1 or L4-S1 depending upon the flexion-extension x-ray results.  Will discuss at next time after she comes back after having further imaging    The following portions of the patient's history were reviewed and updated as appropriate: allergies, current medications, past family history, past medical history, past social history, past surgical history, and problem list.    Review of Systems   All other systems reviewed and are negative.      Objective  Physical Exam  Constitutional:       General: She is awake.      Appearance: She is well-developed.   HENT:      Head: Normocephalic and atraumatic.   Eyes:      General: Lids are normal.      Extraocular Movements: Extraocular movements intact.      Conjunctiva/sclera: Conjunctivae normal.      Pupils: Pupils are equal, round, and reactive to light.   Neck:      Vascular: No carotid bruit.   Neurological:      Mental Status: She is alert.      Coordination: Coordination is intact.      Deep Tendon Reflexes:      Reflex Scores:       Tricep reflexes are 2+ on the right side and 2+ on the left side.       Bicep reflexes are 2+ on the right side and 2+ on the left side.       Brachioradialis reflexes are 2+ on the right side and 2+ on the left side.       Patellar reflexes are 2+ on the right side and 2+ on the left side.       Achilles reflexes are 2+ on the right side and 2+ on the left side.  Psychiatric:         Speech: Speech normal.       Neurological Exam  Mental Status  Awake and alert. Oriented only to person, place, time and situation.  Recent and remote memory are intact. Speech is normal. Language is fluent with no aphasia. Attention and concentration are normal. Fund of knowledge is appropriate for level of education.    Cranial Nerves  CN II: Visual acuity is normal. Visual fields full to confrontation.  CN III, IV, VI: Extraocular movements intact bilaterally. Normal lids and orbits bilaterally. Pupils equal round and reactive to light bilaterally.  CN V: Facial sensation is normal.  CN VII: Full and symmetric facial movement.  CN IX, X: Palate elevates symmetrically. Normal gag reflex.  CN XI: Shoulder shrug strength is normal.  CN XII: Tongue midline without atrophy or fasciculations.    Motor  Normal muscle bulk throughout. Normal muscle tone.                                               Right                     Left  Rhomboids                            5                          5  Infraspinatus                          5                          5  Supraspinatus                       5                          5  Deltoid                                   5                          5   Biceps                                   5                          5  Brachioradialis                      5                          5   Triceps                                  5                          5   Pronator                                5                          5   Supinator                              5                           5   Wrist flexor                            5                          5   Wrist extensor                       5                          5   Finger flexor                          5                          5   Finger extensor                     5                          5   Interossei                              5                          5   Abductor pollicis brevis         5                          5   Flexor pollicis brevis             5                          5   Opponens pollicis                 5                           5  Extensor digitorum               5                          5  Abductor digiti minimi           5                          5   Abdominal                            5                          5  Glutei                                    5                          5  Hip abductor                         5                          5  Hip adductor                         5                          5   Iliopsoas                               5                          5   Quadriceps                           5                          5   Hamstring                             5                          5   Gastrocnemius                     5                           5   Anterior tibialis                      5                          5   Posterior tibialis                    5                          5   Peroneal                               5                          5  Ankle dorsiflexor                   5                          5  Ankle plantar flexor              5                           5  Extensor hallucis longus      5                           5    Sensory  Light touch is normal in upper and lower extremities. Proprioception is normal in upper and lower extremities.     Reflexes                                            Right                      Left  Brachioradialis                    2+                         2+  Biceps                                 2+                         2+  Triceps                                2+                         2+  Finger flex                           2+                         2+  Hamstring                            2+                         2+  Patellar                                2+                         2+  Achilles                                2+                         2+    Coordination    Finger-to-nose, rapid alternating movements and heel-to-shin normal bilaterally without dysmetria.    Gait  Casual gait is normal  including stance, stride, and arm swing.Normal toe walking. Normal heel walking. Normal tandem gait.       Assessment & Plan  Independent Review of Radiographic Studies:      The lumbar MRI done on 9//24 shows spondylolisthesis at L4-L5 with severe stenosis at that level, more moderate stenosis at L2-L3 postoperative changes at L4-L5 and severe recurrent stenosis, and severe bilateral recess stenosis at L5-S1.  There is also a syrinx in the upper lumbar region that seems to originate in the thoracic spine, the cephalad part at about T10.    Medical Decision Making:      We are likely heading toward surgery but we do need to get a thoracic MRI to fully assess the extent of her syrinx.  Will start her on gabapentin at 100 mg twice daily and see if that helps in any way and flexion-extension films will be needed to see if she needs to be fused because of instability although I doubt it.  Will discuss surgical intervention next time around.    Diagnoses and all orders for this visit:    1. Spondylolisthesis at L3-L4 level (Primary)  -     XR Spine Lumbar Complete With Flex & Ext; Future  -     MRI Thoracic Spine Without Contrast; Future  -     gabapentin (NEURONTIN) 100 MG capsule; Take 1 capsule p.o. nightly x 7 days then twice daily  Dispense: 60 capsule; Refill: 5    2. Spinal stenosis of lumbar region with neurogenic claudication  -     XR Spine Lumbar Complete With Flex & Ext; Future  -     MRI Thoracic Spine Without Contrast; Future  -     gabapentin (NEURONTIN) 100 MG capsule; Take 1 capsule p.o. nightly x 7 days then twice daily  Dispense: 60 capsule; Refill: 5    3. History of lumbar surgery  -     XR Spine Lumbar Complete With Flex & Ext; Future  -     MRI Thoracic Spine Without Contrast; Future  -     gabapentin (NEURONTIN) 100 MG capsule; Take 1 capsule p.o. nightly x 7 days then twice daily  Dispense: 60 capsule; Refill: 5    4. Syringomyelia and syringobulbia      Return in about 4 weeks (around 4/30/2025)  for After MRI and x-ray.

## 2025-04-07 ENCOUNTER — TELEPHONE (OUTPATIENT)
Dept: FAMILY MEDICINE CLINIC | Facility: CLINIC | Age: 80
End: 2025-04-07
Payer: MEDICARE

## 2025-04-07 ENCOUNTER — TELEPHONE (OUTPATIENT)
Dept: NEUROSURGERY | Facility: CLINIC | Age: 80
End: 2025-04-07

## 2025-04-07 DIAGNOSIS — D50.9 IRON DEFICIENCY ANEMIA, UNSPECIFIED IRON DEFICIENCY ANEMIA TYPE: Primary | ICD-10-CM

## 2025-04-07 LAB
BASOPHILS # BLD MANUAL: 0.14 10*3/MM3 (ref 0–0.2)
BASOPHILS NFR BLD MANUAL: 2 % (ref 0–1.5)
DIFFERENTIAL COMMENT: ABNORMAL
EOSINOPHIL # BLD MANUAL: 0.35 10*3/MM3 (ref 0–0.4)
EOSINOPHIL NFR BLD MANUAL: 5.1 % (ref 0.3–6.2)
ERYTHROCYTE [DISTWIDTH] IN BLOOD BY AUTOMATED COUNT: 16.6 % (ref 12.3–15.4)
HCT VFR BLD AUTO: 25.6 % (ref 34–46.6)
HGB BLD-MCNC: 7.1 G/DL (ref 12–15.9)
IRON SATN MFR SERPL: 3 % (ref 20–50)
IRON SERPL-MCNC: 24 MCG/DL (ref 37–145)
LYMPHOCYTES # BLD MANUAL: 1.13 10*3/MM3 (ref 0.7–3.1)
LYMPHOCYTES NFR BLD MANUAL: 16.3 % (ref 19.6–45.3)
MCH RBC QN AUTO: 19.9 PG (ref 26.6–33)
MCHC RBC AUTO-ENTMCNC: 27.7 G/DL (ref 31.5–35.7)
MCV RBC AUTO: 71.9 FL (ref 79–97)
MONOCYTES # BLD MANUAL: 0.49 10*3/MM3 (ref 0.1–0.9)
MONOCYTES NFR BLD MANUAL: 7.1 % (ref 5–12)
NEUTROPHILS # BLD MANUAL: 4.83 10*3/MM3 (ref 1.7–7)
NEUTROPHILS NFR BLD MANUAL: 69.4 % (ref 42.7–76)
NRBC BLD AUTO-RTO: 0 /100 WBC (ref 0–0.2)
PLATELET # BLD AUTO: 448 10*3/MM3 (ref 140–450)
PLATELET BLD QL SMEAR: ABNORMAL
RBC # BLD AUTO: 3.56 10*6/MM3 (ref 3.77–5.28)
RBC MORPH BLD: ABNORMAL
RETICS/RBC NFR AUTO: 1.74 % (ref 0.7–1.9)
TIBC SERPL-MCNC: 735 MCG/DL
UIBC SERPL-MCNC: 711 MCG/DL (ref 112–346)
WBC # BLD AUTO: 6.96 10*3/MM3 (ref 3.4–10.8)

## 2025-04-07 RX ORDER — FENOFIBRATE 160 MG/1
160 TABLET ORAL DAILY
Qty: 90 TABLET | Refills: 3 | Status: SHIPPED | OUTPATIENT
Start: 2025-04-07

## 2025-04-07 NOTE — TELEPHONE ENCOUNTER
Caller: Lou Shaffer    Relationship: Self    Best call back number: [phone]    What is the best time to reach you: ANYTIME    Who are you requesting to speak with (clinical staff, provider,  specific staff member): CLINICAL STAFF    Do you know the name of the person who called: NA    What was the call regarding: PATIENT CALLED AND IS WANTING TO ADVISE  THAT THE GABAPENTIN IS NOT WORKING FOR HER-PATIENT STATES SHE WAKES UP IN THE MIDDLE OF THE NIGHT WITH HER STOMACH BURNING-PATIENT IS WANTING TO KNOW IF THERE IS SOMETHING ELSE SHE CAN TRY ESPECIALLY SOMETHING IN THE MORNING THAT AN HELP GET HER GOING-PATIENT ADVISED THAT HER PAIN IS WORSE IN THE MORNING-SENDING TO OFFICE TO ADVISE THANK YOU    Is it okay if the provider responds through MyChart: NO

## 2025-04-08 ENCOUNTER — LAB (OUTPATIENT)
Dept: OTHER | Facility: HOSPITAL | Age: 80
End: 2025-04-08
Payer: MEDICARE

## 2025-04-08 ENCOUNTER — CONSULT (OUTPATIENT)
Dept: ONCOLOGY | Facility: CLINIC | Age: 80
End: 2025-04-08
Payer: MEDICARE

## 2025-04-08 VITALS
OXYGEN SATURATION: 97 % | HEIGHT: 60 IN | DIASTOLIC BLOOD PRESSURE: 95 MMHG | SYSTOLIC BLOOD PRESSURE: 149 MMHG | RESPIRATION RATE: 16 BRPM | TEMPERATURE: 98 F | WEIGHT: 125.9 LBS | HEART RATE: 74 BPM | BODY MASS INDEX: 24.72 KG/M2

## 2025-04-08 DIAGNOSIS — D64.9 ANEMIA, UNSPECIFIED TYPE: Primary | ICD-10-CM

## 2025-04-08 DIAGNOSIS — D50.9 IRON DEFICIENCY ANEMIA, UNSPECIFIED IRON DEFICIENCY ANEMIA TYPE: Primary | ICD-10-CM

## 2025-04-08 LAB
ALBUMIN SERPL-MCNC: 4.6 G/DL (ref 3.5–5.2)
ALBUMIN/GLOB SERPL: 1.9 G/DL
ALP SERPL-CCNC: 46 U/L (ref 39–117)
ALT SERPL W P-5'-P-CCNC: 8 U/L (ref 1–33)
ANION GAP SERPL CALCULATED.3IONS-SCNC: 9.3 MMOL/L (ref 5–15)
AST SERPL-CCNC: 15 U/L (ref 1–32)
BASOPHILS # BLD AUTO: 0.08 10*3/MM3 (ref 0–0.2)
BASOPHILS NFR BLD AUTO: 1.1 % (ref 0–1.5)
BILIRUB SERPL-MCNC: 0.3 MG/DL (ref 0–1.2)
BUN SERPL-MCNC: 20 MG/DL (ref 8–23)
BUN/CREAT SERPL: 29 (ref 7–25)
C3 FRG RBC-MCNC: NORMAL
CALCIUM SPEC-SCNC: 9.5 MG/DL (ref 8.6–10.5)
CHLORIDE SERPL-SCNC: 102 MMOL/L (ref 98–107)
CO2 SERPL-SCNC: 23.7 MMOL/L (ref 22–29)
CREAT SERPL-MCNC: 0.69 MG/DL (ref 0.57–1)
DEPRECATED RDW RBC AUTO: 44.8 FL (ref 37–54)
EGFRCR SERPLBLD CKD-EPI 2021: 87.9 ML/MIN/1.73
EOSINOPHIL # BLD AUTO: 0.47 10*3/MM3 (ref 0–0.4)
EOSINOPHIL NFR BLD AUTO: 6.5 % (ref 0.3–6.2)
ERYTHROCYTE [DISTWIDTH] IN BLOOD BY AUTOMATED COUNT: 17.7 % (ref 12.3–15.4)
FERRITIN SERPL-MCNC: 9.33 NG/ML (ref 13–150)
GLOBULIN UR ELPH-MCNC: 2.4 GM/DL
GLUCOSE SERPL-MCNC: 85 MG/DL (ref 65–99)
HCT VFR BLD AUTO: 24.6 % (ref 34–46.6)
HGB BLD-MCNC: 7 G/DL (ref 12–15.9)
HYPOCHROMIA BLD QL: NORMAL
IMM GRANULOCYTES # BLD AUTO: 0.03 10*3/MM3 (ref 0–0.05)
IMM GRANULOCYTES NFR BLD AUTO: 0.4 % (ref 0–0.5)
IRON 24H UR-MRATE: 17 MCG/DL (ref 37–145)
IRON SATN MFR SERPL: 2 % (ref 20–50)
LYMPHOCYTES # BLD AUTO: 1.36 10*3/MM3 (ref 0.7–3.1)
LYMPHOCYTES NFR BLD AUTO: 18.7 % (ref 19.6–45.3)
MCH RBC QN AUTO: 19.8 PG (ref 26.6–33)
MCHC RBC AUTO-ENTMCNC: 28.5 G/DL (ref 31.5–35.7)
MCV RBC AUTO: 69.5 FL (ref 79–97)
MONOCYTES # BLD AUTO: 0.66 10*3/MM3 (ref 0.1–0.9)
MONOCYTES NFR BLD AUTO: 9.1 % (ref 5–12)
NEUTROPHILS NFR BLD AUTO: 4.66 10*3/MM3 (ref 1.7–7)
NEUTROPHILS NFR BLD AUTO: 64.2 % (ref 42.7–76)
NRBC BLD AUTO-RTO: 0 /100 WBC (ref 0–0.2)
PLAT MORPH BLD: NORMAL
PLATELET # BLD AUTO: 363 10*3/MM3 (ref 140–450)
PMV BLD AUTO: 9.1 FL (ref 6–12)
POTASSIUM SERPL-SCNC: 4.4 MMOL/L (ref 3.5–5.2)
PROT SERPL-MCNC: 7 G/DL (ref 6–8.5)
RBC # BLD AUTO: 3.54 10*6/MM3 (ref 3.77–5.28)
SODIUM SERPL-SCNC: 135 MMOL/L (ref 136–145)
STOMATOCYTES BLD QL SMEAR: NORMAL
TARGETS BLD QL SMEAR: NORMAL
TIBC SERPL-MCNC: 758 MCG/DL (ref 298–536)
TRANSFERRIN SERPL-MCNC: 509 MG/DL (ref 200–360)
WBC MORPH BLD: NORMAL
WBC NRBC COR # BLD AUTO: 7.26 10*3/MM3 (ref 3.4–10.8)

## 2025-04-08 PROCEDURE — 83540 ASSAY OF IRON: CPT | Performed by: INTERNAL MEDICINE

## 2025-04-08 PROCEDURE — 85025 COMPLETE CBC W/AUTO DIFF WBC: CPT | Performed by: INTERNAL MEDICINE

## 2025-04-08 PROCEDURE — 85007 BL SMEAR W/DIFF WBC COUNT: CPT | Performed by: INTERNAL MEDICINE

## 2025-04-08 PROCEDURE — 82728 ASSAY OF FERRITIN: CPT | Performed by: INTERNAL MEDICINE

## 2025-04-08 PROCEDURE — 3080F DIAST BP >= 90 MM HG: CPT | Performed by: INTERNAL MEDICINE

## 2025-04-08 PROCEDURE — 99204 OFFICE O/P NEW MOD 45 MIN: CPT | Performed by: INTERNAL MEDICINE

## 2025-04-08 PROCEDURE — 3077F SYST BP >= 140 MM HG: CPT | Performed by: INTERNAL MEDICINE

## 2025-04-08 PROCEDURE — 84466 ASSAY OF TRANSFERRIN: CPT | Performed by: INTERNAL MEDICINE

## 2025-04-08 PROCEDURE — 80053 COMPREHEN METABOLIC PANEL: CPT | Performed by: INTERNAL MEDICINE

## 2025-04-08 PROCEDURE — 1126F AMNT PAIN NOTED NONE PRSNT: CPT | Performed by: INTERNAL MEDICINE

## 2025-04-08 PROCEDURE — 36415 COLL VENOUS BLD VENIPUNCTURE: CPT

## 2025-04-08 NOTE — PROGRESS NOTES
REASON FOR CONSULTATION: Anemia  Provide an opinion on any further workup or treatment                             REQUESTING PHYSICIAN: Juan C Parish MD    RECORDS OBTAINED:  Records of the patients history including those obtained from the referring provider were reviewed and summarized in detail.    HISTORY OF PRESENT ILLNESS:  The patient is a 79 y.o. year old female who is here for an opinion about the above issue.    History of Present Illness   The patient is an 79-year-old female followed by primary care with hypertension, hypothyroidism and was seen recently for worsening back pain 4/2/2025.  Is concerned the latter should undergone surgery for right L4-5 herniated disc in 2008 and has required eventually epidural blocks every 3 to 4 months thereafter.  She is thought to have severe stenosis at multiple levels including L2-L3, L5 to/1 but symptomatic L4-L5 and L5-S1 and possibly L3/L4.  Dr. Madrid saw the patient and felt that surgery might be necessary with plans to pursue MRI and x-ray    As part of her analyses studies include progressive anemia-variable anemia from 1 year ago when she was considerably iron deficient with 3% saturation H&H 9.5 and 31.6.    Records indicate osteoarthritis of the right knee status post right total knee arthroplasty 11/8 - 9/20/2024.  Preoperative testing testing 10/28/2024 including H&H 11.1 and 35.4, 3/7/2025 H&H is 7.1 and 25.3 with MCV now at 71.9, MCH 20.2 and MCHC of 28.1, RDW of 15.8.       The patient is an 79-year-old female who presents for evaluation of acute anemia.    She has a history of intermittent anemia though she reports no recent episodes of hematuria, hematochezia, hemoptysis, or hematemesis. Despite her deteriorating back condition, she maintains a high energy level and overall good health. She has been experiencing pica, specifically a craving for ice, for the past 3 months. Her stool color changed from light to normal brown about 1.5 months  ago. She experienced weight loss and decreased appetite following knee surgery, which she attributes to postoperative pain. However, her appetite has since returned to normal, although she reports difficulty in gaining weight. She experienced generalized swelling post-surgery, which resolved with ice application. She reports no fevers, sweats, chills, or weight changes. She has attempted iron supplementation but discontinued due to gastrointestinal upset. She reports no visual changes, shortness of breath, cough, chest pain, choking sensation, palpitations, or rapid heart rate. She reports no vaginal bleeding or discharge, pelvic pain, urinary difficulties, or hematuria. She has a tendency to bruise easily. She experiences occasional dizziness, particularly in hot weather, a symptom that has been present for several years. She reports dry skin and mouth, which she manages by chewing ice. She reports no oral pain. She reports no numbness in her feet but experiences sciatica pain radiating down both legs upon waking. She reports no heel pain.    She has a history of ulcerative colitis, diagnosed approximately 15 years ago by Dr. Velez and Dr. Cooper at [facility]. She has been experiencing diarrhea for several months, which she attributes to her colitis. She reports occasional abdominal pain but no hematochezia. She has had previous episodes of bloody stools, which were attributed to hemorrhoids following testing. She has been screened for colon cancer, with negative results. She is currently not on any anticoagulant therapy. She was previously on Eliquis for a clot in her leg following knee surgery but discontinued the medication after the clot resolved. She is currently on budesonide for her colitis but admits to noncompliance with the medication.    Supplemental Information  She is under the care of Dr. Parish for hypertension and hypothyroidism. She has been experiencing worsening back pain and underwent  surgery for disc disease in 2008. Despite the surgery, she continues to have back issues and was recently evaluated by Dr. Madrid who suggested further imaging studies before considering another surgery.    SOCIAL HISTORY  She retired from work in 2008.    MEDICATIONS  Current: Budesonide.  Discontinued: Eliquis.       Past Medical History:   Diagnosis Date    Acne     Adnexal mass 2002    RIGHT, S/P [facility] BSO    Anxiety     Breast cancer     Chronic headaches 01/04/2016    MRI OF BRAIN AT [facility] SHOWED PARTIAL OPACIFICATIONS OF THE FRONTAL ETHMOID AND MAXILLARY SINUSES AS WELL AS LEFT SPHENOID SINUS WITH FLUID AND MUCOSAL THICKENING. MINIMAL SMALL VESSEL DISEASE IN THE CEREBRAL WHITE MATTER. THE REMAINDER IS WNL, NO SIGNIFICANT CHANGE WHEN COMPARED TO PRIOR MRI ON 1/20/2009.    Chronic low back pain     Collagenous colitis 07/2019    DDD (degenerative disc disease), cervical 2005    Diarrhea 01/2019    Disease of thyroid gland     HYPOTHYROIDISM    Dizziness 06/11/2018    SEEN AT [facility] ER    Dry skin     Dysuria 11/2019    Erosive gastritis with hemorrhage     Fall 03/10/2014    CONTUSION OF COCCYX, SEEN AT [facility] ER    GERD (gastroesophageal reflux disease)     Glaucoma     H/O hemorrhoids     History of strabismus     Hormone replacement therapy (HRT)     Hot flashes     Hyperlipidemia     Hypertension     Hypertensive emergency 01/11/2006    SEEN AT [facility] ER    Insomnia     Iron deficiency anemia     Leukopenia 11/2019    Night sweats 01/2018    Positive fecal occult blood test 01/19/2018    Post-menopausal     PUD (peptic ulcer disease)     Rectal bleeding 07/2019    Right knee pain     Scar of nose     Trigger thumb of both hands 04/2018    UTI (urinary tract infection)     ON ANTIBIOTICS INSTRUCTED PT TO NOTIFY DR URBAN    Vitamin D deficiency         Past Surgical History:   Procedure Laterality Date    COLONOSCOPY N/A 05/27/2010    NON BLEEDING INTERNAL HEMORRHOIDS, BX SHOWED COLLAGENOUS COLITIS, RESCOPE IN 5  YRS, DR. PEREZ    COLONOSCOPY N/A 08/23/2004    WNL, RESCOPE IN 5 YRS, DR. CM HASTINGS AT [facility]    COLONOSCOPY N/A 02/08/2019    ENTIRE COLON WNL, RANDOM BX: COLLAGENOUS COLITIS, DR. ROSEANN PEREZ AT [place]    COLONOSCOPY N/A 02/26/2018    DIVERTICULOSIS THROUGHOUT SIGMOID, RESCOPE IN 5 YRS, DR. TAISHA MANNING AT  [facility]    ENDOSCOPY N/A 05/27/2010    EROSIVE GASTROPATHY, BX SHOWED SCATTERED HELIOBACTER LIKE ORGANISMS, DR. PEREZ AT [facility]    EYE SURGERY Right     CORNEAL TRANSPLANT    EYE SURGERY      STRABISMUS REPAIR BY RESECTION    EYE SURGERY Right 02/23/2016    GLAUCOMA SURGERY, DR. RITA THORNE AT [place]    HYSTERECTOMY N/A 02/25/2002    [facility] BSO, DR. MICKIE MCFADDEN AT [facility]    LUMBAR DISCECTOMY ANTERIOR WITH ARTIFICIAL DISC IMPLANTATION N/A 1998    L5-S1 DISCECTOMY, Dr. Malik    OOPHORECTOMY Bilateral 02/25/2002    [facility] BSO, DR. MICKIE MCFADDEN AT [facility]    TONSILLECTOMY AND ADENOIDECTOMY Bilateral     TOTAL KNEE ARTHROPLASTY Right 11/8/2024    Procedure: Right TOTAL KNEE ARTHROPLASTY;  Surgeon: Steve Montez MD;  Location: [facility] OR [facility];  Service: Orthopedics;  Laterality: Right;        Current Outpatient Medications on File Prior to Visit   Medication Sig Dispense Refill    acetaminophen (TYLENOL) 500 MG tablet Take 1 tablet by mouth Every 6 (Six) Hours As Needed for Mild Pain. Indications: Pain      ALPRAZolam (XANAX) 0.5 MG tablet Take 1 tablet by mouth Daily As Needed for Anxiety. Indications: Feeling Anxious 45 tablet 2    apixaban (ELIQUIS) 5 MG tablet tablet Take 1 tablet by mouth 2 (Two) Times a Day. Indications: DVT/PE (active thrombosis) 60 tablet 1    baclofen (LIORESAL) 10 MG tablet TAKE 1 TABLET BY MOUTH TWICE  DAILY 180 tablet 3    benazepril (LOTENSIN) 5 MG tablet Take 1 tablet by mouth every night at bedtime. Indications: High Blood Pressure 90 tablet 3    Budesonide (ENTOCORT EC) 3 MG 24 hr capsule Take 1 capsule by mouth Daily. Indications: Collagenous Colitis      cephalexin (KEFLEX)  500 MG capsule Take 4 tablets 1 hour prior to dental cleaning or procedure. 4 capsule 1    Cholecalciferol (VITAMIN D-3) 1000 UNITS capsule Take 1,000 Units by mouth Daily. TO HOLD 1 WEEK BEFORE SURGERY    Indications: Vitamin D Deficiency      docusate sodium (Colace) 100 MG capsule Take 1 capsule by mouth 2 (Two) Times a Day. (Patient taking differently: Take 1 capsule by mouth 2 (Two) Times a Day.) 60 capsule 0    fenofibrate 160 MG tablet TAKE 1 TABLET BY MOUTH DAILY 90 tablet 3    gabapentin (NEURONTIN) 100 MG capsule Take 1 capsule p.o. nightly x 7 days then twice daily 60 capsule 5    HYDROcodone-acetaminophen (NORCO) 5-325 MG per tablet Take 1 tablet by mouth Every 6 (Six) Hours As Needed for Severe Pain. 30 tablet 0    HYDROcodone-acetaminophen (Norco) 5-325 MG per tablet Take 1 tablet by mouth Every 8 (Eight) Hours As Needed for Mild Pain or Moderate Pain. 20 tablet 0    levothyroxine (SYNTHROID, LEVOTHROID) 75 MCG tablet TAKE 1 TABLET BY MOUTH DAILY 90 tablet 3    magnesium oxide (MAG-OX) 400 MG tablet Take 1 tablet by mouth 2 (two) times a day. TO HOLD 1 WEEK BEFORE SURGERY      meclizine (ANTIVERT) 25 MG tablet Take 1 tablet by mouth 4 (Four) Times a Day. (Patient taking differently: Take 1 tablet by mouth 3 (Three) Times a Day As Needed.) 40 tablet 1    meloxicam (MOBIC) 7.5 MG tablet Take 1 tablet by mouth Daily with food. (Patient taking differently: Take 1 tablet by mouth Daily.) 7 tablet 0    ondansetron (Zofran) 4 MG tablet Take 1 tablet by mouth Every 8 (Eight) Hours As Needed for Nausea or Vomiting for up to 10 doses. Indications: Nausea and Vomiting Following an Operation 10 tablet 0    pantoprazole (PROTONIX) 40 MG EC tablet TAKE 1 TABLET BY MOUTH TWICE  DAILY 180 tablet 3     No current facility-administered medications on file prior to visit.        ALLERGIES:    Allergies   Allergen Reactions    Meperidine Nausea And Vomiting        Social History     Socioeconomic History    Marital  "status:    Tobacco Use    Smoking status: Never     Passive exposure: Never    Smokeless tobacco: Never   Vaping Use    Vaping status: Never Used   Substance and Sexual Activity    Alcohol use: Yes     Comment: 1 BOURBON DAILY MOST DAYS    Drug use: No    Sexual activity: Yes     Partners: Male        Family History   Problem Relation Age of Onset    Pneumonia Mother     COPD Father     Coronary artery disease Sister     Breast cancer Sister     Cancer Sister     Lung cancer Sister     Cancer Sister     Stroke Brother         CVA    Heart disease Brother     Heart attack Brother     Ovarian cancer Neg Hx     Colon cancer Neg Hx     Colon polyps Neg Hx     Crohn's disease Neg Hx     Irritable bowel syndrome Neg Hx     Ulcerative colitis Neg Hx     Malig Hyperthermia Neg Hx         Review of Systems   Constitutional:  Negative for activity change, appetite change, chills, diaphoresis, fatigue, fever and unexpected weight change.   HENT:  Positive for postnasal drip.    Eyes: Negative.    Respiratory: Negative.     Gastrointestinal:  Positive for abdominal pain and diarrhea.   Genitourinary: Negative.    Musculoskeletal:  Positive for back pain.   Allergic/Immunologic: Positive for environmental allergies.   Neurological:  Positive for dizziness and light-headedness.   Hematological: Negative.    Psychiatric/Behavioral: Negative.          Objective     Vitals:    04/08/25 1505   BP: 149/95   Pulse: 74   Resp: 16   Temp: 98 °F (36.7 °C)   TempSrc: Oral   SpO2: 97%   Weight: 57.1 kg (125 lb 14.4 oz)   Height: 152.4 cm (60\")   PainSc: 0-No pain         4/8/2025     3:04 PM   Current Status   ECOG score 0       Physical Exam  Constitutional:       Appearance: Normal appearance. She is normal weight.   HENT:      Head: Normocephalic and atraumatic.      Nose: Nose normal.      Mouth/Throat:      Mouth: Mucous membranes are moist.      Pharynx: Oropharynx is clear.   Eyes:      Extraocular Movements: Extraocular " movements intact.      Conjunctiva/sclera: Conjunctivae normal.      Pupils: Pupils are equal, round, and reactive to light.   Cardiovascular:      Rate and Rhythm: Normal rate and regular rhythm.      Pulses: Normal pulses.      Heart sounds: Normal heart sounds.   Pulmonary:      Effort: Pulmonary effort is normal.      Breath sounds: Normal breath sounds.   Abdominal:      General: Bowel sounds are normal.      Palpations: Abdomen is soft.   Musculoskeletal:         General: Normal range of motion.      Cervical back: Normal range of motion and neck supple.   Skin:     General: Skin is warm and dry.   Neurological:      General: No focal deficit present.      Mental Status: She is oriented to person, place, and time.   Psychiatric:         Mood and Affect: Mood normal.           RECENT LABS:  Hematology WBC   Date Value Ref Range Status   04/08/2025 7.26 3.40 - 10.80 10*3/mm3 Final   04/04/2025 6.96 3.40 - 10.80 10*3/mm3 Final     RBC   Date Value Ref Range Status   04/08/2025 3.54 (L) 3.77 - 5.28 10*6/mm3 Final   04/04/2025 3.56 (L) 3.77 - 5.28 10*6/mm3 Final     Hemoglobin   Date Value Ref Range Status   04/08/2025 7.0 (L) 12.0 - 15.9 g/dL Final     Hematocrit   Date Value Ref Range Status   04/08/2025 24.6 (L) 34.0 - 46.6 % Final     Platelets   Date Value Ref Range Status   04/08/2025 363 140 - 450 10*3/mm3 Final          Assessment & Plan      1. Acute anemia.  Her hemoglobin levels have shown significant fluctuations over the past year, with a notable decrease from 12.7 on 01/24/2024 and 10/24/2024 to 11.1 on 03/2025 and currently it is 7. This suggests a gradual onset of anemia over the past 5 months. Laboratory results indicate the development of microcytosis and severe iron deficiency. The etiology of her anemia remains uncertain, but potential causes include blood loss from knee surgery and/or uncontrolled ulcerative colitis. Her current condition of severe iron deficiency anemia would preclude her from  undergoing surgery with Dr. Madrid. Her hemoglobin level is approaching the threshold for transfusion, but she is not experiencing any physical distress.  She is intolerant to oral iron after previous attempts to use this in the past.  It is plausible that her anemia could be attributed to slow blood loss from procedures or the gastrointestinal tract. Intravenous iron therapy will be initiated this week, with weekly monitoring until an upward trend in hemoglobin levels is observed. A note will be sent to neurosurgery regarding the scheduling of her procedure.    2. Ulcerative colitis.  She has a history of ulcerative colitis diagnosed approximately 15 years ago. She reports occasional diarrhea and abdominal pain but no recent blood in the stool. She has not been consistently taking her prescribed budesonide. She is advised to resume daily intake of budesonide to manage her symptoms and prevent further complications.    PROCEDURE  The patient underwent knee surgery in November 2024.     Plan:  *Return to laboratory today for repeat ferritin, iron profile, CMP  At the time of this dictation regarding send 9.33, iron of 17 with TIBC 758 or 2% saturation, CMP with sodium 135, normal LFTs  *Her deficit is approximately 1300 mg IV and will make application for Feraheme or Venofer and she will require either 2 treatments of Feraheme or 4 of Venofer.  *Return this Friday for IV iron, repeat 1 week later, 2 weeks NP, possible IV iron.  *Copy of this note to Dr. Madrid      Patient or patient representative verbalized consent for the use of Ambient Listening during the visit with  Vineet Avitia MD for chart documentation. 4/8/2025  21:51 EDT

## 2025-04-08 NOTE — LETTER
April 8, 2025     Juan C Parish Sr., MD  [address]  [address]  [facility] [address]    Patient: Lou Shaffer   YOB: 1945   Date of Visit: 4/8/2025     Dear Juan C Parish Sr., MD:       Thank you for referring Lou Shaffer to me for evaluation. Below are the relevant portions of my assessment and plan of care.    If you have questions, please do not hesitate to call me. I look forward to following Lou along with you.         Sincerely,        Vineet Avitia MD        CC: MD Sadi Woodard Michael D., MD  04/08/25 9787  Sign when Signing Visit        REASON FOR CONSULTATION: Anemia  Provide an opinion on any further workup or treatment                             REQUESTING PHYSICIAN: Juan C Parish MD    RECORDS OBTAINED:  Records of the patients history including those obtained from the referring provider were reviewed and summarized in detail.    HISTORY OF PRESENT ILLNESS:  The patient is a 79 y.o. year old female who is here for an opinion about the above issue.    History of Present Illness   The patient is an 79-year-old female followed by primary care with hypertension, hypothyroidism and was seen recently for worsening back pain 4/2/2025.  Is concerned the latter should undergone surgery for right L4-5 herniated disc in 2008 and has required eventually epidural blocks every 3 to 4 months thereafter.  She is thought to have severe stenosis at multiple levels including L2-L3, L5 to/1 but symptomatic L4-L5 and L5-S1 and possibly L3/L4.  Dr. Madrid saw the patient and felt that surgery might be necessary with plans to pursue MRI and x-ray    As part of her analyses studies include progressive anemia-variable anemia from 1 year ago when she was considerably iron deficient with 3% saturation H&H 9.5 and 31.6.    Records indicate osteoarthritis of the right knee status post right total knee arthroplasty 11/8 - 9/20/2024.  Preoperative testing testing 10/28/2024  including H&H 11.1 and 35.4, 3/7/2025 H&H is 7.1 and 25.3 with MCV now at 71.9, MCH 20.2 and MCHC of 28.1, RDW of 15.8.       The patient is an 79-year-old female who presents for evaluation of acute anemia.    She has a history of intermittent anemia though she reports no recent episodes of hematuria, hematochezia, hemoptysis, or hematemesis. Despite her deteriorating back condition, she maintains a high energy level and overall good health. She has been experiencing pica, specifically a craving for ice, for the past 3 months. Her stool color changed from light to normal brown about 1.5 months ago. She experienced weight loss and decreased appetite following knee surgery, which she attributes to postoperative pain. However, her appetite has since returned to normal, although she reports difficulty in gaining weight. She experienced generalized swelling post-surgery, which resolved with ice application. She reports no fevers, sweats, chills, or weight changes. She has attempted iron supplementation but discontinued due to gastrointestinal upset. She reports no visual changes, shortness of breath, cough, chest pain, choking sensation, palpitations, or rapid heart rate. She reports no vaginal bleeding or discharge, pelvic pain, urinary difficulties, or hematuria. She has a tendency to bruise easily. She experiences occasional dizziness, particularly in hot weather, a symptom that has been present for several years. She reports dry skin and mouth, which she manages by chewing ice. She reports no oral pain. She reports no numbness in her feet but experiences sciatica pain radiating down both legs upon waking. She reports no heel pain.    She has a history of ulcerative colitis, diagnosed approximately 15 years ago by Dr. Velez and Dr. Cooper at [facility]. She has been experiencing diarrhea for several months, which she attributes to her colitis. She reports occasional abdominal pain but no hematochezia. She has  had previous episodes of bloody stools, which were attributed to hemorrhoids following testing. She has been screened for colon cancer, with negative results. She is currently not on any anticoagulant therapy. She was previously on Eliquis for a clot in her leg following knee surgery but discontinued the medication after the clot resolved. She is currently on budesonide for her colitis but admits to noncompliance with the medication.    Supplemental Information  She is under the care of Dr. Parish for hypertension and hypothyroidism. She has been experiencing worsening back pain and underwent surgery for disc disease in 2008. Despite the surgery, she continues to have back issues and was recently evaluated by Dr. Madrid who suggested further imaging studies before considering another surgery.    SOCIAL HISTORY  She retired from work in 2008.    MEDICATIONS  Current: Budesonide.  Discontinued: Eliquis.       Past Medical History:   Diagnosis Date   • Acne    • Adnexal mass 2002    RIGHT, S/P JAZMIN BSO   • Anxiety    • Breast cancer    • Chronic headaches 01/04/2016    MRI OF BRAIN AT [facility] SHOWED PARTIAL OPACIFICATIONS OF THE FRONTAL ETHMOID AND MAXILLARY SINUSES AS WELL AS LEFT SPHENOID SINUS WITH FLUID AND MUCOSAL THICKENING. MINIMAL SMALL VESSEL DISEASE IN THE CEREBRAL WHITE MATTER. THE REMAINDER IS WNL, NO SIGNIFICANT CHANGE WHEN COMPARED TO PRIOR MRI ON 1/20/2009.   • Chronic low back pain    • Collagenous colitis 07/2019   • DDD (degenerative disc disease), cervical 2005   • Diarrhea 01/2019   • Disease of thyroid gland     HYPOTHYROIDISM   • Dizziness 06/11/2018    SEEN AT [facility] ER   • Dry skin    • Dysuria 11/2019   • Erosive gastritis with hemorrhage    • Fall 03/10/2014    CONTUSION OF COCCYX, SEEN AT [facility] ER   • GERD (gastroesophageal reflux disease)    • Glaucoma    • H/O hemorrhoids    • History of strabismus    • Hormone replacement therapy (HRT)    • Hot flashes    • Hyperlipidemia    • Hypertension    •  Hypertensive emergency 01/11/2006    SEEN AT [facility] ER   • Insomnia    • Iron deficiency anemia    • Leukopenia 11/2019   • Night sweats 01/2018   • Positive fecal occult blood test 01/19/2018   • Post-menopausal    • PUD (peptic ulcer disease)    • Rectal bleeding 07/2019   • Right knee pain    • Scar of nose    • Trigger thumb of both hands 04/2018   • UTI (urinary tract infection)     ON ANTIBIOTICS INSTRUCTED PT TO NOTIFY DR URBAN   • Vitamin D deficiency         Past Surgical History:   Procedure Laterality Date   • COLONOSCOPY N/A 05/27/2010    NON BLEEDING INTERNAL HEMORRHOIDS, BX SHOWED COLLAGENOUS COLITIS, RESCOPE IN 5 YRS, DR. PEREZ   • COLONOSCOPY N/A 08/23/2004    WNL, RESCOPE IN 5 YRS, DR. CM HASTINGS AT [facility]   • COLONOSCOPY N/A 02/08/2019    ENTIRE COLON WNL, RANDOM BX: COLLAGENOUS COLITIS, DR. ROSEANN PEREZ AT [place]   • COLONOSCOPY N/A 02/26/2018    DIVERTICULOSIS THROUGHOUT SIGMOID, RESCOPE IN 5 YRS, DR. TAISHA MANNING AT [facility]   • ENDOSCOPY N/A 05/27/2010    EROSIVE GASTROPATHY, BX SHOWED SCATTERED HELIOBACTER LIKE ORGANISMS, DR. PEREZ AT [facility]   • EYE SURGERY Right     CORNEAL TRANSPLANT   • EYE SURGERY      STRABISMUS REPAIR BY RESECTION   • EYE SURGERY Right 02/23/2016    GLAUCOMA SURGERY, DR. RITA THORNE AT [facility] [facility]   • HYSTERECTOMY N/A 02/25/2002    [facility] BSO, DR. MICKIE MCFADDEN AT [facility]   • LUMBAR DISCECTOMY ANTERIOR WITH ARTIFICIAL DISC IMPLANTATION N/A 1998    L5-S1 DISCECTOMY, Dr. Malik   • [facility]   • TONSILLECTOMY AND ADENOIDECTOMY Bilateral    • TOTAL KNEE ARTHROPLASTY Right 11/8/2024    Procedure: Right TOTAL KNEE ARTHROPLASTY;  Surgeon: Steve Urban MD;  [place] [facility] [place] [facility]  Service: Orthopedics;  Laterality: Right;        Current Outpatient Medications on File Prior to Visit   Medication Sig Dispense Refill   • acetaminophen (TYLENOL) 500 MG tablet Take 1 tablet by mouth Every 6 (Six) Hours As Needed  for Mild Pain. Indications: Pain     • ALPRAZolam (XANAX) 0.5 MG tablet Take 1 tablet by mouth Daily As Needed for Anxiety. Indications: Feeling Anxious 45 tablet 2   • apixaban (ELIQUIS) 5 MG tablet tablet Take 1 tablet by mouth 2 (Two) Times a Day. Indications: DVT/PE (active thrombosis) 60 tablet 1   • baclofen (LIORESAL) 10 MG tablet TAKE 1 TABLET BY MOUTH TWICE  DAILY 180 tablet 3   • benazepril (LOTENSIN) 5 MG tablet Take 1 tablet by mouth every night at bedtime. Indications: High Blood Pressure 90 tablet 3   • Budesonide (ENTOCORT EC) 3 MG 24 hr capsule Take 1 capsule by mouth Daily. Indications: Collagenous Colitis     • cephalexin (KEFLEX) 500 MG capsule Take 4 tablets 1 hour prior to dental cleaning or procedure. 4 capsule 1   • Cholecalciferol (VITAMIN D-3) 1000 UNITS capsule Take 1,000 Units by mouth Daily. TO HOLD 1 WEEK BEFORE SURGERY    Indications: Vitamin D Deficiency     • docusate sodium (Colace) 100 MG capsule Take 1 capsule by mouth 2 (Two) Times a Day. (Patient taking differently: Take 1 capsule by mouth 2 (Two) Times a Day.) 60 capsule 0   • fenofibrate 160 MG tablet TAKE 1 TABLET BY MOUTH DAILY 90 tablet 3   • gabapentin (NEURONTIN) 100 MG capsule Take 1 capsule p.o. nightly x 7 days then twice daily 60 capsule 5   • HYDROcodone-acetaminophen (NORCO) 5-325 MG per tablet Take 1 tablet by mouth Every 6 (Six) Hours As Needed for Severe Pain. 30 tablet 0   • HYDROcodone-acetaminophen (Norco) 5-325 MG per tablet Take 1 tablet by mouth Every 8 (Eight) Hours As Needed for Mild Pain or Moderate Pain. 20 tablet 0   • levothyroxine (SYNTHROID, LEVOTHROID) 75 MCG tablet TAKE 1 TABLET BY MOUTH DAILY 90 tablet 3   • magnesium oxide (MAG-OX) 400 MG tablet Take 1 tablet by mouth 2 (two) times a day. TO HOLD 1 WEEK BEFORE SURGERY     • meclizine (ANTIVERT) 25 MG tablet Take 1 tablet by mouth 4 (Four) Times a Day. (Patient taking differently: Take 1 tablet by mouth 3 (Three) Times a Day As Needed.) 40 tablet  1   • meloxicam (MOBIC) 7.5 MG tablet Take 1 tablet by mouth Daily with food. (Patient taking differently: Take 1 tablet by mouth Daily.) 7 tablet 0   • ondansetron (Zofran) 4 MG tablet Take 1 tablet by mouth Every 8 (Eight) Hours As Needed for Nausea or Vomiting for up to 10 doses. Indications: Nausea and Vomiting Following an Operation 10 tablet 0   • pantoprazole (PROTONIX) 40 MG EC tablet TAKE 1 TABLET BY MOUTH TWICE  DAILY 180 tablet 3     No current facility-administered medications on file prior to visit.        ALLERGIES:    Allergies   Allergen Reactions   • Meperidine Nausea And Vomiting        Social History     Socioeconomic History   • Marital status:    Tobacco Use   • Smoking status: Never     Passive exposure: Never   • Smokeless tobacco: Never   Vaping Use   • Vaping status: Never Used   Substance and Sexual Activity   • Alcohol use: Yes     Comment: 1 BOURBON DAILY MOST DAYS   • Drug use: No   • Sexual activity: Yes     Partners: Male        Family History   Problem Relation Age of Onset   • Pneumonia Mother    • COPD Father    • Coronary artery disease Sister    • Breast cancer Sister    • Cancer Sister    • Lung cancer Sister    • Cancer Sister    • Stroke Brother         CVA   • Heart disease Brother    • Heart attack Brother    • Ovarian cancer Neg Hx    • Colon cancer Neg Hx    • Colon polyps Neg Hx    • Crohn's disease Neg Hx    • Irritable bowel syndrome Neg Hx    • Ulcerative colitis Neg Hx    • Malig Hyperthermia Neg Hx         Review of Systems   Constitutional:  Negative for activity change, appetite change, chills, diaphoresis, fatigue, fever and unexpected weight change.   HENT:  Positive for postnasal drip.    Eyes: Negative.    Respiratory: Negative.     Gastrointestinal:  Positive for abdominal pain and diarrhea.   Genitourinary: Negative.    Musculoskeletal:  Positive for back pain.   Allergic/Immunologic: Positive for environmental allergies.   Neurological:  Positive for  "dizziness and light-headedness.   Hematological: Negative.    Psychiatric/Behavioral: Negative.          Objective     Vitals:    04/08/25 1505   BP: 149/95   Pulse: 74   Resp: 16   Temp: 98 °F (36.7 °C)   TempSrc: Oral   SpO2: 97%   Weight: 57.1 kg (125 lb 14.4 oz)   Height: 152.4 cm (60\")   PainSc: 0-No pain         4/8/2025     3:04 PM   Current Status   ECOG score 0       Physical Exam  Constitutional:       Appearance: Normal appearance. She is normal weight.   HENT:      Head: Normocephalic and atraumatic.      Nose: Nose normal.      Mouth/Throat:      Mouth: Mucous membranes are moist.      Pharynx: Oropharynx is clear.   Eyes:      Extraocular Movements: Extraocular movements intact.      Conjunctiva/sclera: Conjunctivae normal.      Pupils: Pupils are equal, round, and reactive to light.   Cardiovascular:      Rate and Rhythm: Normal rate and regular rhythm.      Pulses: Normal pulses.      Heart sounds: Normal heart sounds.   Pulmonary:      Effort: Pulmonary effort is normal.      Breath sounds: Normal breath sounds.   Abdominal:      General: Bowel sounds are normal.      Palpations: Abdomen is soft.   Musculoskeletal:         General: Normal range of motion.      Cervical back: Normal range of motion and neck supple.   Skin:     General: Skin is warm and dry.   Neurological:      General: No focal deficit present.      Mental Status: She is oriented to person, place, and time.   Psychiatric:         Mood and Affect: Mood normal.           RECENT LABS:  Hematology WBC   Date Value Ref Range Status   04/08/2025 7.26 3.40 - 10.80 10*3/mm3 Final   04/04/2025 6.96 3.40 - 10.80 10*3/mm3 Final     RBC   Date Value Ref Range Status   04/08/2025 3.54 (L) 3.77 - 5.28 10*6/mm3 Final   04/04/2025 3.56 (L) 3.77 - 5.28 10*6/mm3 Final     Hemoglobin   Date Value Ref Range Status   04/08/2025 7.0 (L) 12.0 - 15.9 g/dL Final     Hematocrit   Date Value Ref Range Status   04/08/2025 24.6 (L) 34.0 - 46.6 % Final "     Platelets   Date Value Ref Range Status   04/08/2025 363 140 - 450 10*3/mm3 Final          Assessment & Plan      1. Acute anemia.  Her hemoglobin levels have shown significant fluctuations over the past year, with a notable decrease from 12.7 on 01/24/2024 and 10/24/2024 to 11.1 on 03/2025 and currently it is 7. This suggests a gradual onset of anemia over the past 5 months. Laboratory results indicate the development of microcytosis and severe iron deficiency. The etiology of her anemia remains uncertain, but potential causes include blood loss from knee surgery and/or uncontrolled ulcerative colitis. Her current condition of severe iron deficiency anemia would preclude her from undergoing surgery with Dr. Madrid. Her hemoglobin level is approaching the threshold for transfusion, but she is not experiencing any physical distress.  She is intolerant to oral iron after previous attempts to use this in the past.  It is plausible that her anemia could be attributed to slow blood loss from procedures or the gastrointestinal tract. Intravenous iron therapy will be initiated this week, with weekly monitoring until an upward trend in hemoglobin levels is observed. A note will be sent to neurosurgery regarding the scheduling of her procedure.    2. Ulcerative colitis.  She has a history of ulcerative colitis diagnosed approximately 15 years ago. She reports occasional diarrhea and abdominal pain but no recent blood in the stool. She has not been consistently taking her prescribed budesonide. She is advised to resume daily intake of budesonide to manage her symptoms and prevent further complications.    PROCEDURE  The patient underwent knee surgery in November 2024.     Plan:  *Return to laboratory today for repeat ferritin, iron profile, CMP  At the time of this dictation regarding send 9.33, iron of 17 with TIBC 758 or 2% saturation, CMP with sodium 135, normal LFTs  *Her deficit is approximately 1300 mg IV and  will make application for Feraheme or Venofer and she will require either 2 treatments of Feraheme or 4 of Venofer.  *Return this Friday for IV iron, repeat 1 week later, 2 weeks NP, possible IV iron.  *Copy of this note to Dr. Madrid      Patient or patient representative verbalized consent for the use of Ambient Listening during the visit with  Vineet Avitia MD for chart documentation. 4/8/2025  21:51 EDT

## 2025-04-09 ENCOUNTER — TELEPHONE (OUTPATIENT)
Dept: ONCOLOGY | Facility: CLINIC | Age: 80
End: 2025-04-09
Payer: MEDICARE

## 2025-04-09 DIAGNOSIS — D50.9 IRON DEFICIENCY ANEMIA, UNSPECIFIED IRON DEFICIENCY ANEMIA TYPE: Primary | ICD-10-CM

## 2025-04-09 PROBLEM — T45.4X5A ADVERSE EFFECT OF IRON: Status: ACTIVE | Noted: 2025-04-09

## 2025-04-09 PROBLEM — D64.89 OTHER SPECIFIED ANEMIAS: Status: ACTIVE | Noted: 2025-04-09

## 2025-04-09 RX ORDER — SODIUM CHLORIDE 9 MG/ML
20 INJECTION, SOLUTION INTRAVENOUS ONCE
OUTPATIENT
Start: 2025-04-18

## 2025-04-09 RX ORDER — HYDROCORTISONE SODIUM SUCCINATE 100 MG/2ML
100 INJECTION INTRAMUSCULAR; INTRAVENOUS AS NEEDED
OUTPATIENT
Start: 2025-04-25

## 2025-04-09 RX ORDER — FAMOTIDINE 10 MG/ML
20 INJECTION, SOLUTION INTRAVENOUS ONCE
OUTPATIENT
Start: 2025-04-18

## 2025-04-09 RX ORDER — HYDROCORTISONE SODIUM SUCCINATE 100 MG/2ML
100 INJECTION INTRAMUSCULAR; INTRAVENOUS AS NEEDED
OUTPATIENT
Start: 2025-04-18

## 2025-04-09 RX ORDER — SODIUM CHLORIDE 9 MG/ML
20 INJECTION, SOLUTION INTRAVENOUS ONCE
OUTPATIENT
Start: 2025-04-25

## 2025-04-09 RX ORDER — CETIRIZINE HYDROCHLORIDE 10 MG/1
10 TABLET ORAL ONCE
OUTPATIENT
Start: 2025-04-25

## 2025-04-09 RX ORDER — DIPHENHYDRAMINE HYDROCHLORIDE 50 MG/ML
50 INJECTION, SOLUTION INTRAMUSCULAR; INTRAVENOUS AS NEEDED
Status: CANCELLED | OUTPATIENT
Start: 2025-04-11

## 2025-04-09 RX ORDER — FAMOTIDINE 10 MG/ML
20 INJECTION, SOLUTION INTRAVENOUS ONCE
Status: CANCELLED | OUTPATIENT
Start: 2025-04-11

## 2025-04-09 RX ORDER — DIPHENHYDRAMINE HYDROCHLORIDE 50 MG/ML
50 INJECTION, SOLUTION INTRAMUSCULAR; INTRAVENOUS AS NEEDED
OUTPATIENT
Start: 2025-04-18

## 2025-04-09 RX ORDER — FAMOTIDINE 10 MG/ML
20 INJECTION, SOLUTION INTRAVENOUS AS NEEDED
OUTPATIENT
Start: 2025-04-18

## 2025-04-09 RX ORDER — CETIRIZINE HYDROCHLORIDE 10 MG/1
10 TABLET ORAL ONCE
Status: CANCELLED | OUTPATIENT
Start: 2025-04-11

## 2025-04-09 RX ORDER — FAMOTIDINE 10 MG/ML
20 INJECTION, SOLUTION INTRAVENOUS AS NEEDED
OUTPATIENT
Start: 2025-04-25

## 2025-04-09 RX ORDER — SODIUM CHLORIDE 9 MG/ML
20 INJECTION, SOLUTION INTRAVENOUS ONCE
Status: CANCELLED | OUTPATIENT
Start: 2025-04-11

## 2025-04-09 RX ORDER — FAMOTIDINE 10 MG/ML
20 INJECTION, SOLUTION INTRAVENOUS ONCE
OUTPATIENT
Start: 2025-04-25

## 2025-04-09 RX ORDER — DIPHENHYDRAMINE HYDROCHLORIDE 50 MG/ML
50 INJECTION, SOLUTION INTRAMUSCULAR; INTRAVENOUS AS NEEDED
OUTPATIENT
Start: 2025-04-25

## 2025-04-09 RX ORDER — HYDROCORTISONE SODIUM SUCCINATE 100 MG/2ML
100 INJECTION INTRAMUSCULAR; INTRAVENOUS AS NEEDED
Status: CANCELLED | OUTPATIENT
Start: 2025-04-11

## 2025-04-09 RX ORDER — FAMOTIDINE 10 MG/ML
20 INJECTION, SOLUTION INTRAVENOUS AS NEEDED
Status: CANCELLED | OUTPATIENT
Start: 2025-04-11

## 2025-04-09 RX ORDER — CETIRIZINE HYDROCHLORIDE 10 MG/1
10 TABLET ORAL ONCE
OUTPATIENT
Start: 2025-04-18

## 2025-04-11 ENCOUNTER — INFUSION (OUTPATIENT)
Dept: ONCOLOGY | Facility: HOSPITAL | Age: 80
End: 2025-04-11
Payer: MEDICARE

## 2025-04-11 VITALS
DIASTOLIC BLOOD PRESSURE: 86 MMHG | HEART RATE: 80 BPM | HEIGHT: 60 IN | RESPIRATION RATE: 16 BRPM | OXYGEN SATURATION: 99 % | BODY MASS INDEX: 24.35 KG/M2 | TEMPERATURE: 96.9 F | SYSTOLIC BLOOD PRESSURE: 145 MMHG | WEIGHT: 124 LBS

## 2025-04-11 DIAGNOSIS — D64.89 ANEMIA DUE TO OTHER CAUSE, NOT CLASSIFIED: Primary | ICD-10-CM

## 2025-04-11 DIAGNOSIS — T45.4X5A ADVERSE EFFECT OF IRON, INITIAL ENCOUNTER: ICD-10-CM

## 2025-04-11 PROCEDURE — 96365 THER/PROPH/DIAG IV INF INIT: CPT

## 2025-04-11 PROCEDURE — 25010000002 FAMOTIDINE 10 MG/ML SOLUTION: Performed by: INTERNAL MEDICINE

## 2025-04-11 PROCEDURE — A9270 NON-COVERED ITEM OR SERVICE: HCPCS | Performed by: INTERNAL MEDICINE

## 2025-04-11 PROCEDURE — 96375 TX/PRO/DX INJ NEW DRUG ADDON: CPT

## 2025-04-11 PROCEDURE — 25810000003 SODIUM CHLORIDE 0.9 % SOLUTION: Performed by: INTERNAL MEDICINE

## 2025-04-11 PROCEDURE — 63710000001 CETIRIZINE 10 MG TABLET: Performed by: INTERNAL MEDICINE

## 2025-04-11 PROCEDURE — 25810000003 SODIUM CHLORIDE 0.9 % SOLUTION 250 ML FLEX CONT: Performed by: INTERNAL MEDICINE

## 2025-04-11 PROCEDURE — 25010000002 IRON SUCROSE PER 1 MG: Performed by: INTERNAL MEDICINE

## 2025-04-11 RX ORDER — SODIUM CHLORIDE 9 MG/ML
20 INJECTION, SOLUTION INTRAVENOUS ONCE
Status: COMPLETED | OUTPATIENT
Start: 2025-04-11 | End: 2025-04-11

## 2025-04-11 RX ORDER — FAMOTIDINE 10 MG/ML
20 INJECTION, SOLUTION INTRAVENOUS ONCE
Status: COMPLETED | OUTPATIENT
Start: 2025-04-11 | End: 2025-04-11

## 2025-04-11 RX ORDER — CETIRIZINE HYDROCHLORIDE 10 MG/1
10 TABLET ORAL ONCE
Status: COMPLETED | OUTPATIENT
Start: 2025-04-11 | End: 2025-04-11

## 2025-04-11 RX ADMIN — FAMOTIDINE 20 MG: 10 INJECTION INTRAVENOUS at 13:22

## 2025-04-11 RX ADMIN — SODIUM CHLORIDE 300 MG: 9 INJECTION, SOLUTION INTRAVENOUS at 13:51

## 2025-04-11 RX ADMIN — CETIRIZINE HYDROCHLORIDE 10 MG: 10 TABLET, FILM COATED ORAL at 13:22

## 2025-04-11 RX ADMIN — SODIUM CHLORIDE 20 ML/HR: 9 INJECTION, SOLUTION INTRAVENOUS at 13:24

## 2025-04-11 NOTE — NURSING NOTE
Pt arrived for 1st venofer infusion with no complaints.  Reviewed indications and potential side effects of venofer using Up To Date handout which was given to pt. Pt tolerated infusion without incident and observed for 30 minutes post infusion without any s/s reaction. Instructed to call office with any questions or concerns. Pt verbalized understanding and discharged in stable condition.

## 2025-04-15 ENCOUNTER — TELEPHONE (OUTPATIENT)
Dept: NEUROSURGERY | Facility: CLINIC | Age: 80
End: 2025-04-15
Payer: MEDICARE

## 2025-04-15 ENCOUNTER — HOSPITAL ENCOUNTER (OUTPATIENT)
Dept: MRI IMAGING | Facility: HOSPITAL | Age: 80
Discharge: HOME | End: 2025-04-15
Payer: MEDICARE

## 2025-04-15 ENCOUNTER — HOSPITAL ENCOUNTER (OUTPATIENT)
Dept: GENERAL RADIOLOGY | Facility: HOSPITAL | Age: 80
Discharge: HOME | End: 2025-04-15
Payer: MEDICARE

## 2025-04-15 DIAGNOSIS — M48.062 SPINAL STENOSIS OF LUMBAR REGION WITH NEUROGENIC CLAUDICATION: ICD-10-CM

## 2025-04-15 DIAGNOSIS — Z98.890 HISTORY OF LUMBAR SURGERY: ICD-10-CM

## 2025-04-15 DIAGNOSIS — M43.16 SPONDYLOLISTHESIS AT L3-L4 LEVEL: ICD-10-CM

## 2025-04-15 PROCEDURE — 72114 X-RAY EXAM L-S SPINE BENDING: CPT

## 2025-04-15 PROCEDURE — A9577 INJ MULTIHANCE: HCPCS | Performed by: NEUROLOGICAL SURGERY

## 2025-04-15 PROCEDURE — 72157 MRI CHEST SPINE W/O & W/DYE: CPT

## 2025-04-15 PROCEDURE — 25510000002 GADOBENATE DIMEGLUMINE 529 MG/ML SOLUTION: Performed by: NEUROLOGICAL SURGERY

## 2025-04-15 RX ADMIN — GADOBENATE DIMEGLUMINE 12 ML: 529 INJECTION, SOLUTION INTRAVENOUS at 15:20

## 2025-04-15 NOTE — TELEPHONE ENCOUNTER
Radiology Called me regarding this patient's MRI. Instead of w/out contrast she will be getting a thoracic spine MRI w/ and w/out contrast at the request of the radiologist after they have reviewed her chart.     There are no further actions needed for this encounter- just an FYI from radiology.

## 2025-04-18 ENCOUNTER — INFUSION (OUTPATIENT)
Dept: ONCOLOGY | Facility: HOSPITAL | Age: 80
End: 2025-04-18
Payer: MEDICARE

## 2025-04-18 VITALS
HEART RATE: 74 BPM | SYSTOLIC BLOOD PRESSURE: 159 MMHG | OXYGEN SATURATION: 95 % | WEIGHT: 125.8 LBS | BODY MASS INDEX: 24.57 KG/M2 | DIASTOLIC BLOOD PRESSURE: 99 MMHG | RESPIRATION RATE: 16 BRPM | TEMPERATURE: 97.8 F

## 2025-04-18 DIAGNOSIS — T45.4X5A ADVERSE EFFECT OF IRON, INITIAL ENCOUNTER: ICD-10-CM

## 2025-04-18 DIAGNOSIS — D64.89 ANEMIA DUE TO OTHER CAUSE, NOT CLASSIFIED: Primary | ICD-10-CM

## 2025-04-18 PROCEDURE — 25810000003 SODIUM CHLORIDE 0.9 % SOLUTION 250 ML FLEX CONT: Performed by: INTERNAL MEDICINE

## 2025-04-18 PROCEDURE — 25010000002 IRON SUCROSE PER 1 MG: Performed by: INTERNAL MEDICINE

## 2025-04-18 PROCEDURE — 63710000001 CETIRIZINE 10 MG TABLET: Performed by: INTERNAL MEDICINE

## 2025-04-18 PROCEDURE — 96365 THER/PROPH/DIAG IV INF INIT: CPT

## 2025-04-18 PROCEDURE — A9270 NON-COVERED ITEM OR SERVICE: HCPCS | Performed by: INTERNAL MEDICINE

## 2025-04-18 PROCEDURE — 96375 TX/PRO/DX INJ NEW DRUG ADDON: CPT

## 2025-04-18 PROCEDURE — 25010000002 FAMOTIDINE 10 MG/ML SOLUTION: Performed by: INTERNAL MEDICINE

## 2025-04-18 RX ORDER — FAMOTIDINE 10 MG/ML
20 INJECTION, SOLUTION INTRAVENOUS ONCE
Status: COMPLETED | OUTPATIENT
Start: 2025-04-18 | End: 2025-04-18

## 2025-04-18 RX ORDER — CETIRIZINE HYDROCHLORIDE 10 MG/1
10 TABLET ORAL ONCE
Status: COMPLETED | OUTPATIENT
Start: 2025-04-18 | End: 2025-04-18

## 2025-04-18 RX ADMIN — IRON SUCROSE 300 MG: 20 INJECTION, SOLUTION INTRAVENOUS at 13:41

## 2025-04-18 RX ADMIN — FAMOTIDINE 20 MG: 10 INJECTION INTRAVENOUS at 13:11

## 2025-04-18 RX ADMIN — CETIRIZINE HYDROCHLORIDE 10 MG: 10 TABLET, FILM COATED ORAL at 13:11

## 2025-04-18 NOTE — NURSING NOTE
Pt tolerated venofer infusion without incident and observed for 30 minutes post infusion.  Post observation 's- 160's / .  Pt denies any complaints and states she take her bp meds at night.  Notified HAROLDO Burnett of BP's as noted above and denies any symptoms.  Per Marga instructed pt to take BP med when she gets home, monitor BP at home and to go to ER for any s/s stroke (dizziness, blurred vision, headache, weakness on either side of body).  Pt verbalized understanding and discharged in stable condition.

## 2025-04-25 ENCOUNTER — INFUSION (OUTPATIENT)
Dept: ONCOLOGY | Facility: HOSPITAL | Age: 80
End: 2025-04-25
Payer: MEDICARE

## 2025-04-25 ENCOUNTER — OFFICE VISIT (OUTPATIENT)
Dept: ONCOLOGY | Facility: CLINIC | Age: 80
End: 2025-04-25
Payer: MEDICARE

## 2025-04-25 ENCOUNTER — APPOINTMENT (OUTPATIENT)
Dept: ONCOLOGY | Facility: HOSPITAL | Age: 80
End: 2025-04-25
Payer: MEDICARE

## 2025-04-25 VITALS
RESPIRATION RATE: 16 BRPM | HEIGHT: 60 IN | TEMPERATURE: 97.9 F | HEART RATE: 83 BPM | BODY MASS INDEX: 24.4 KG/M2 | OXYGEN SATURATION: 97 % | DIASTOLIC BLOOD PRESSURE: 84 MMHG | SYSTOLIC BLOOD PRESSURE: 130 MMHG | WEIGHT: 124.3 LBS

## 2025-04-25 VITALS — HEART RATE: 66 BPM | DIASTOLIC BLOOD PRESSURE: 87 MMHG | SYSTOLIC BLOOD PRESSURE: 151 MMHG

## 2025-04-25 DIAGNOSIS — D50.9 IRON DEFICIENCY ANEMIA, UNSPECIFIED IRON DEFICIENCY ANEMIA TYPE: Primary | ICD-10-CM

## 2025-04-25 DIAGNOSIS — D64.89 ANEMIA DUE TO OTHER CAUSE, NOT CLASSIFIED: Primary | ICD-10-CM

## 2025-04-25 DIAGNOSIS — D50.9 IRON DEFICIENCY ANEMIA, UNSPECIFIED IRON DEFICIENCY ANEMIA TYPE: ICD-10-CM

## 2025-04-25 DIAGNOSIS — T45.4X5A ADVERSE EFFECT OF IRON, INITIAL ENCOUNTER: ICD-10-CM

## 2025-04-25 LAB
BASOPHILS # BLD AUTO: 0.09 10*3/MM3 (ref 0–0.2)
BASOPHILS NFR BLD AUTO: 1 % (ref 0–1.5)
DEPRECATED RDW RBC AUTO: 72.6 FL (ref 37–54)
EOSINOPHIL # BLD AUTO: 0.5 10*3/MM3 (ref 0–0.4)
EOSINOPHIL NFR BLD AUTO: 5.8 % (ref 0.3–6.2)
ERYTHROCYTE [DISTWIDTH] IN BLOOD BY AUTOMATED COUNT: 26.3 % (ref 12.3–15.4)
HCT VFR BLD AUTO: 34.3 % (ref 34–46.6)
HGB BLD-MCNC: 9.8 G/DL (ref 12–15.9)
IMM GRANULOCYTES # BLD AUTO: 0.09 10*3/MM3 (ref 0–0.05)
IMM GRANULOCYTES NFR BLD AUTO: 1 % (ref 0–0.5)
LYMPHOCYTES # BLD AUTO: 1.11 10*3/MM3 (ref 0.7–3.1)
LYMPHOCYTES NFR BLD AUTO: 12.9 % (ref 19.6–45.3)
MCH RBC QN AUTO: 22.4 PG (ref 26.6–33)
MCHC RBC AUTO-ENTMCNC: 28.6 G/DL (ref 31.5–35.7)
MCV RBC AUTO: 78.5 FL (ref 79–97)
MONOCYTES # BLD AUTO: 0.69 10*3/MM3 (ref 0.1–0.9)
MONOCYTES NFR BLD AUTO: 8 % (ref 5–12)
NEUTROPHILS NFR BLD AUTO: 6.12 10*3/MM3 (ref 1.7–7)
NEUTROPHILS NFR BLD AUTO: 71.3 % (ref 42.7–76)
NRBC BLD AUTO-RTO: 0 /100 WBC (ref 0–0.2)
PLATELET # BLD AUTO: 482 10*3/MM3 (ref 140–450)
PMV BLD AUTO: 9.9 FL (ref 6–12)
RBC # BLD AUTO: 4.37 10*6/MM3 (ref 3.77–5.28)
WBC NRBC COR # BLD AUTO: 8.6 10*3/MM3 (ref 3.4–10.8)

## 2025-04-25 PROCEDURE — 25810000003 SODIUM CHLORIDE 0.9 % SOLUTION: Performed by: INTERNAL MEDICINE

## 2025-04-25 PROCEDURE — A9270 NON-COVERED ITEM OR SERVICE: HCPCS | Performed by: INTERNAL MEDICINE

## 2025-04-25 PROCEDURE — 96375 TX/PRO/DX INJ NEW DRUG ADDON: CPT

## 2025-04-25 PROCEDURE — 96366 THER/PROPH/DIAG IV INF ADDON: CPT

## 2025-04-25 PROCEDURE — 25010000002 IRON SUCROSE PER 1 MG: Performed by: INTERNAL MEDICINE

## 2025-04-25 PROCEDURE — 85025 COMPLETE CBC W/AUTO DIFF WBC: CPT | Performed by: INTERNAL MEDICINE

## 2025-04-25 PROCEDURE — 63710000001 CETIRIZINE 10 MG TABLET: Performed by: INTERNAL MEDICINE

## 2025-04-25 PROCEDURE — 96365 THER/PROPH/DIAG IV INF INIT: CPT

## 2025-04-25 PROCEDURE — 25010000002 FAMOTIDINE 10 MG/ML SOLUTION: Performed by: INTERNAL MEDICINE

## 2025-04-25 RX ORDER — CETIRIZINE HYDROCHLORIDE 10 MG/1
10 TABLET ORAL ONCE
Status: COMPLETED | OUTPATIENT
Start: 2025-04-25 | End: 2025-04-25

## 2025-04-25 RX ORDER — FAMOTIDINE 10 MG/ML
20 INJECTION, SOLUTION INTRAVENOUS ONCE
Status: COMPLETED | OUTPATIENT
Start: 2025-04-25 | End: 2025-04-25

## 2025-04-25 RX ADMIN — CETIRIZINE HYDROCHLORIDE 10 MG: 10 TABLET, FILM COATED ORAL at 12:04

## 2025-04-25 RX ADMIN — FAMOTIDINE 20 MG: 10 INJECTION INTRAVENOUS at 12:04

## 2025-04-25 RX ADMIN — IRON SUCROSE 300 MG: 20 INJECTION, SOLUTION INTRAVENOUS at 12:20

## 2025-04-25 NOTE — PROGRESS NOTES
REASON FOR CONSULTATION: Anemia  Provide an opinion on any further workup or treatment                             REQUESTING PHYSICIAN: Juan C Parish MD    RECORDS OBTAINED:  Records of the patients history including those obtained from the referring provider were reviewed and summarized in detail.    HISTORY OF PRESENT ILLNESS:  The patient is a 79 y.o. year old female who is here for an opinion about the above issue.    History of Present Illness    The patient returns to the office today, 4/25/2025 for her third dose of IV iron with Venofer and lab review.  She reports she is tolerated her Venofer infusions well.  She has initially struggled with some fatigue after the infusions though reports she is feeling very well the past few days.  She denies obvious signs or symptoms of bleeding.  She reports her stool is normal in appearance.  She reports that her last follow-up with gastroenterology which was a few years ago she was told she would not require repeat colonoscopy.    HEMATOLOGY HISTORY  The patient is an 79-year-old female followed by primary care with hypertension, hypothyroidism and was seen recently for worsening back pain 4/2/2025.  Is concerned the latter should undergone surgery for right L4-5 herniated disc in 2008 and has required eventually epidural blocks every 3 to 4 months thereafter.  She is thought to have severe stenosis at multiple levels including L2-L3, L5 to/1 but symptomatic L4-L5 and L5-S1 and possibly L3/L4.  Dr. Madrid saw the patient and felt that surgery might be necessary with plans to pursue MRI and x-ray    As part of her analyses studies include progressive anemia-variable anemia from 1 year ago when she was considerably iron deficient with 3% saturation H&H 9.5 and 31.6.    Records indicate osteoarthritis of the right knee status post right total knee arthroplasty 11/8 - 9/20/2024.  Preoperative testing testing 10/28/2024 including H&H 11.1 and 35.4, 3/7/2025 H&H is 7.1 and  25.3 with MCV now at 71.9, MCH 20.2 and MCHC of 28.1, RDW of 15.8.       The patient is an 79-year-old female who presents for evaluation of acute anemia.    She has a history of intermittent anemia though she reports no recent episodes of hematuria, hematochezia, hemoptysis, or hematemesis. Despite her deteriorating back condition, she maintains a high energy level and overall good health. She has been experiencing pica, specifically a craving for ice, for the past 3 months. Her stool color changed from light to normal brown about 1.5 months ago. She experienced weight loss and decreased appetite following knee surgery, which she attributes to postoperative pain. However, her appetite has since returned to normal, although she reports difficulty in gaining weight. She experienced generalized swelling post-surgery, which resolved with ice application. She reports no fevers, sweats, chills, or weight changes. She has attempted iron supplementation but discontinued due to gastrointestinal upset. She reports no visual changes, shortness of breath, cough, chest pain, choking sensation, palpitations, or rapid heart rate. She reports no vaginal bleeding or discharge, pelvic pain, urinary difficulties, or hematuria. She has a tendency to bruise easily. She experiences occasional dizziness, particularly in hot weather, a symptom that has been present for several years. She reports dry skin and mouth, which she manages by chewing ice. She reports no oral pain. She reports no numbness in her feet but experiences sciatica pain radiating down both legs upon waking. She reports no heel pain.    She has a history of ulcerative colitis, diagnosed approximately 15 years ago by Dr. Velez and Dr. Cooper at [facility]. She has been experiencing diarrhea for several months, which she attributes to her colitis. She reports occasional abdominal pain but no hematochezia. She has had previous episodes of bloody stools, which were  attributed to hemorrhoids following testing. She has been screened for colon cancer, with negative results. She is currently not on any anticoagulant therapy. She was previously on Eliquis for a clot in her leg following knee surgery but discontinued the medication after the clot resolved. She is currently on budesonide for her colitis but admits to noncompliance with the medication.    Supplemental Information  She is under the care of Dr. Parish for hypertension and hypothyroidism. She has been experiencing worsening back pain and underwent surgery for disc disease in 2008. Despite the surgery, she continues to have back issues and was recently evaluated by Dr. Madrid who suggested further imaging studies before considering another surgery.    SOCIAL HISTORY  She retired from work in 2008.    MEDICATIONS  Current: Budesonide.  Discontinued: Eliquis.       Past Medical History:   Diagnosis Date    Acne     Adnexal mass 2002    RIGHT, S/P JAZMIN BSO    Anxiety     Arthritis     Breast cancer     Chronic headaches 01/04/2016    MRI OF BRAIN AT [facility] SHOWED PARTIAL OPACIFICATIONS OF THE FRONTAL ETHMOID AND MAXILLARY SINUSES AS WELL AS LEFT SPHENOID SINUS WITH FLUID AND MUCOSAL THICKENING. MINIMAL SMALL VESSEL DISEASE IN THE CEREBRAL WHITE MATTER. THE REMAINDER IS WNL, NO SIGNIFICANT CHANGE WHEN COMPARED TO PRIOR MRI ON 1/20/2009.    Chronic low back pain     Collagenous colitis 07/2019    DDD (degenerative disc disease), cervical 2005    Diarrhea 01/2019    Disease of thyroid gland     HYPOTHYROIDISM    Dizziness 06/11/2018    SEEN AT [facility] ER    Dry skin     Dysuria 11/2019    Erosive gastritis with hemorrhage     Fall 03/10/2014    CONTUSION OF COCCYX, SEEN AT [facility] ER    GERD (gastroesophageal reflux disease)     Glaucoma     H/O hemorrhoids     History of strabismus     Hormone replacement therapy (HRT)     Hot flashes     Hyperlipidemia     Hypertension     Hypertensive emergency 01/11/2006    SEEN AT [facility] ER     Insomnia     Iron deficiency anemia     Leukopenia 11/2019    Night sweats 01/2018    Positive fecal occult blood test 01/19/2018    Post-menopausal     PUD (peptic ulcer disease)     Rectal bleeding 07/2019    Right knee pain     Scar of nose     Trigger thumb of both hands 04/2018    UTI (urinary tract infection)     ON ANTIBIOTICS INSTRUCTED PT TO NOTIFY DR URBAN    Vitamin D deficiency         Past Surgical History:   Procedure Laterality Date    COLONOSCOPY N/A 05/27/2010    NON BLEEDING INTERNAL HEMORRHOIDS, BX SHOWED COLLAGENOUS COLITIS, RESCOPE IN 5 YRS, DR. PEREZ    COLONOSCOPY N/A 08/23/2004    WNL, RESCOPE IN 5 YRS, DR. CM HASTINGS AT [facility]    COLONOSCOPY N/A 02/08/2019    ENTIRE COLON WNL, RANDOM BX: COLLAGENOUS COLITIS, DR. ROSEANN PEREZ AT [place]    COLONOSCOPY N/A 02/26/2018    DIVERTICULOSIS THROUGHOUT SIGMOID, RESCOPE IN 5 YRS, DR. TAISHA MANNING AT  [facility]    ENDOSCOPY N/A 05/27/2010    EROSIVE GASTROPATHY, BX SHOWED SCATTERED HELIOBACTER LIKE ORGANISMS, DR. PEREZ AT [facility]    EYE SURGERY Right     CORNEAL TRANSPLANT    EYE SURGERY      STRABISMUS REPAIR BY RESECTION    EYE SURGERY Right 02/23/2016    GLAUCOMA SURGERY, DR. RITA THORNE AT [place]    HYSTERECTOMY N/A 02/25/2002    [facility] BSO, DR. MICKIE MCFADDEN AT [facility]    LUMBAR DISCECTOMY ANTERIOR WITH ARTIFICIAL DISC IMPLANTATION N/A 1998    L5-S1 DISCECTOMY, Dr. Malik    OOPHORECTOMY Bilateral 02/25/2002    [facility] BSO, DR. MICKIE MCFADDEN AT [facility]    TONSILLECTOMY AND ADENOIDECTOMY Bilateral     TOTAL KNEE ARTHROPLASTY Right 11/8/2024    Procedure: Right TOTAL KNEE ARTHROPLASTY;  Surgeon: Steve Urban MD;  Location: [facility] OR [facility];  Service: Orthopedics;  Laterality: Right;        Current Outpatient Medications on File Prior to Visit   Medication Sig Dispense Refill    acetaminophen (TYLENOL) 500 MG tablet Take 1 tablet by mouth Every 6 (Six) Hours As Needed for Mild Pain. Indications: Pain      ALPRAZolam (XANAX) 0.5 MG tablet Take 1  tablet by mouth Daily As Needed for Anxiety. Indications: Feeling Anxious 45 tablet 2    apixaban (ELIQUIS) 5 MG tablet tablet Take 1 tablet by mouth 2 (Two) Times a Day. Indications: DVT/PE (active thrombosis) 60 tablet 1    baclofen (LIORESAL) 10 MG tablet TAKE 1 TABLET BY MOUTH TWICE  DAILY 180 tablet 3    benazepril (LOTENSIN) 5 MG tablet Take 1 tablet by mouth every night at bedtime. Indications: High Blood Pressure 90 tablet 3    Budesonide (ENTOCORT EC) 3 MG 24 hr capsule Take 1 capsule by mouth Daily. Indications: Collagenous Colitis      cephalexin (KEFLEX) 500 MG capsule Take 4 tablets 1 hour prior to dental cleaning or procedure. 4 capsule 1    Cholecalciferol (VITAMIN D-3) 1000 UNITS capsule Take 1,000 Units by mouth Daily. TO HOLD 1 WEEK BEFORE SURGERY    Indications: Vitamin D Deficiency      docusate sodium (Colace) 100 MG capsule Take 1 capsule by mouth 2 (Two) Times a Day. (Patient taking differently: Take 1 capsule by mouth 2 (Two) Times a Day.) 60 capsule 0    fenofibrate 160 MG tablet TAKE 1 TABLET BY MOUTH DAILY 90 tablet 3    gabapentin (NEURONTIN) 100 MG capsule Take 1 capsule p.o. nightly x 7 days then twice daily 60 capsule 5    HYDROcodone-acetaminophen (NORCO) 5-325 MG per tablet Take 1 tablet by mouth Every 6 (Six) Hours As Needed for Severe Pain. 30 tablet 0    HYDROcodone-acetaminophen (Norco) 5-325 MG per tablet Take 1 tablet by mouth Every 8 (Eight) Hours As Needed for Mild Pain or Moderate Pain. 20 tablet 0    levothyroxine (SYNTHROID, LEVOTHROID) 75 MCG tablet TAKE 1 TABLET BY MOUTH DAILY 90 tablet 3    magnesium oxide (MAG-OX) 400 MG tablet Take 1 tablet by mouth 2 (two) times a day. TO HOLD 1 WEEK BEFORE SURGERY      meclizine (ANTIVERT) 25 MG tablet Take 1 tablet by mouth 4 (Four) Times a Day. (Patient taking differently: Take 1 tablet by mouth 3 (Three) Times a Day As Needed.) 40 tablet 1    ondansetron (Zofran) 4 MG tablet Take 1 tablet by mouth Every 8 (Eight) Hours As Needed  "for Nausea or Vomiting for up to 10 doses. Indications: Nausea and Vomiting Following an Operation 10 tablet 0    pantoprazole (PROTONIX) 40 MG EC tablet TAKE 1 TABLET BY MOUTH TWICE  DAILY 180 tablet 3    [DISCONTINUED] meloxicam (MOBIC) 7.5 MG tablet Take 1 tablet by mouth Daily with food. (Patient taking differently: Take 1 tablet by mouth Daily.) 7 tablet 0     No current facility-administered medications on file prior to visit.        ALLERGIES:    Allergies   Allergen Reactions    Meperidine Nausea And Vomiting        Social History     Socioeconomic History    Marital status:     Number of children: 1   Tobacco Use    Smoking status: Never     Passive exposure: Never    Smokeless tobacco: Never   Vaping Use    Vaping status: Never Used   Substance and Sexual Activity    Alcohol use: Yes     Comment: 1 BOURBON DAILY MOST DAYS    Drug use: No    Sexual activity: Yes     Partners: Male        Family History   Problem Relation Age of Onset    Pneumonia Mother     COPD Father     Lung cancer Sister     Kidney cancer Sister     Coronary artery disease Sister     Breast cancer Sister     Cancer Sister     Cancer Sister     Stroke Brother         CVA    Heart disease Brother     Heart attack Brother     Ovarian cancer Neg Hx     Colon cancer Neg Hx     Colon polyps Neg Hx     Crohn's disease Neg Hx     Irritable bowel syndrome Neg Hx     Ulcerative colitis Neg Hx     Malig Hyperthermia Neg Hx         Review of Systems   ROS as per HPI    Objective     Vitals:    04/25/25 1127   BP: 130/84   Pulse: 83   Resp: 16   Temp: 97.9 °F (36.6 °C)   TempSrc: Oral   SpO2: 97%   Weight: 56.4 kg (124 lb 4.8 oz)   Height: 152.4 cm (60\")   PainSc: 0-No pain         4/25/2025    11:23 AM   Current Status   ECOG score 0     Physical Exam  Constitutional:       Appearance: Normal appearance. She is normal weight.   HENT:      Head: Normocephalic and atraumatic.      Nose: Nose normal.      Mouth/Throat:      Mouth: Mucous " membranes are moist.      Pharynx: Oropharynx is clear.   Eyes:      Extraocular Movements: Extraocular movements intact.      Conjunctiva/sclera: Conjunctivae normal.      Pupils: Pupils are equal, round, and reactive to light.   Cardiovascular:      Rate and Rhythm: Normal rate and regular rhythm.      Pulses: Normal pulses.      Heart sounds: Normal heart sounds.   Pulmonary:      Effort: Pulmonary effort is normal.      Breath sounds: Normal breath sounds.   Abdominal:      General: Bowel sounds are normal.      Palpations: Abdomen is soft.   Musculoskeletal:         General: Normal range of motion.      Cervical back: Normal range of motion and neck supple.   Skin:     General: Skin is warm and dry.   Neurological:      General: No focal deficit present.      Mental Status: She is oriented to person, place, and time.   Psychiatric:         Mood and Affect: Mood normal.           RECENT LABS:  Results from last 7 days   Lab Units 04/25/25  1118   WBC 10*3/mm3 8.60   NEUTROS ABS 10*3/mm3 6.12   HEMOGLOBIN g/dL 9.8*   HEMATOCRIT % 34.3   PLATELETS 10*3/mm3 482*                 Assessment & Plan     *Acute anemia  Her hemoglobin levels have shown significant fluctuations over the past year, with a notable decrease from 12.7 on 01/24/2024 and 10/24/2024 to 11.1 on 03/2025 and currently it is 7.   Laboratory results indicate the development of microcytosis and severe iron deficiency.   The etiology of her anemia remains uncertain, but potential causes include blood loss from knee surgery and/or uncontrolled ulcerative colitis.   4/8/2025 profound iron deficiency with ferritin 9.3Iron saturation 2%, hemoglobin 7.0  Her current condition of severe iron deficiency anemia would preclude her from undergoing surgery with Dr. Madrid.  She is intolerant to oral iron after previous attempts to use this in the past.   Recommendations for IV iron replacement with Venofer 300 mg weekly x 3  Today, 4/25/2025 patient's due for  her third dose of Venofer with improvement in her hemoglobin to 9.8.  She will proceed with her third dose of Venofer    *Ulcerative colitis  Diagnosed approximately 15 years ago.  Occasional abdominal pain but no recent blood in her stool  At initial consultation with Dr. Avitia, she was not consistently taking her budesonide  The patient reports today she was released from GI as her symptoms were well-controlled with no recommendations for follow-up colonoscopy.  We did review if she develops recurrent iron deficiency and may warrant colonoscopy to identify source of GI blood loss    Plan:  Proceed today with third and final dose of IV Venofer 300 mg  Will notify Dr. Madrid of improved hemoglobin and iron status.  If at some point she does need to proceed with surgery, she has clearance from hematologic standpoint  Return in 6 weeks for CBC with RN review ensuring hemoglobin continues to improve  MD follow-up with Dr. Avitia in 12 weeks with CBC, ferritin, iron profile  We did review today if the patient develops recurrent iron deficiency she may require repeat evaluation by GI to evaluate the state of her ulcerative colitis    Dary Sosa, APRN  04/25/2025

## 2025-05-28 NOTE — PROGRESS NOTES
Subjective   Patient ID: Lou Shaffer is a 79 y.o. female is here today for follow-up after MRI.    History of Present Illness    This patient had a lumbar decompression from L3-S1 by another surgeon in 2008.  She did well for about 12 years until she began having recurrent bilateral buttock and leg pain that is failed to respond to epidural blocks by her pain physician Dr. Ireland.  The gabapentin I gave her caused side effects and did not help her so she stopped it.  She is not on oral pain medications.  She has neurogenic claudication involving both sides into her buttocks and down her legs, right worse than left.  She has no motor deficits.  But she has a poor quality of life because of her neurogenic claudication.  The syrinx is incidental and extends from T8-T12.  Will simply need to watch it.  I just wanted to know the true extent of it with the thoracic MRI.  She needs to be decompressed and fused from L3-S1 with cages and pedicle screws and rods.  The goals risks and benefits are described below.  She is generally healthy and I think she should tolerate this.  She understands that there is a risk of other problems in the future potentially requiring juxta level fusions.  The goals risks and benefits are described below.  She does not have any significant cardiac history.  If her PAT does not show any problems then we will need to get cardiology involved.    The following portions of the patient's history were reviewed and updated as appropriate: allergies, current medications, past family history, past medical history, past social history, past surgical history, and problem list.    Review of Systems   All other systems reviewed and are negative.      Objective   Physical Exam  Constitutional:       General: She is awake.      Appearance: She is well-developed.   HENT:      Head: Normocephalic and atraumatic.   Eyes:      General: Lids are normal.      Extraocular Movements: Extraocular movements intact.       Conjunctiva/sclera: Conjunctivae normal.      Pupils: Pupils are equal, round, and reactive to light.   Neck:      Vascular: No carotid bruit.   Neurological:      Mental Status: She is alert.      Coordination: Coordination is intact.      Deep Tendon Reflexes:      Reflex Scores:       Tricep reflexes are 2+ on the right side and 2+ on the left side.       Bicep reflexes are 2+ on the right side and 2+ on the left side.       Brachioradialis reflexes are 2+ on the right side and 2+ on the left side.       Patellar reflexes are 2+ on the right side and 2+ on the left side.       Achilles reflexes are 2+ on the right side and 2+ on the left side.  Psychiatric:         Speech: Speech normal.       Neurological Exam  Mental Status  Awake and alert. Oriented only to person, place, time and situation. Recent and remote memory are intact. Speech is normal. Language is fluent with no aphasia. Attention and concentration are normal. Fund of knowledge is appropriate for level of education.    Cranial Nerves  CN II: Visual acuity is normal. Visual fields full to confrontation.  CN III, IV, VI: Extraocular movements intact bilaterally. Normal lids and orbits bilaterally. Pupils equal round and reactive to light bilaterally.  CN V: Facial sensation is normal.  CN VII: Full and symmetric facial movement.  CN IX, X: Palate elevates symmetrically. Normal gag reflex.  CN XI: Shoulder shrug strength is normal.  CN XII: Tongue midline without atrophy or fasciculations.    Motor  Normal muscle bulk throughout. Normal muscle tone.                                               Right                     Left  Rhomboids                            5                          5  Infraspinatus                          5                          5  Supraspinatus                       5                          5  Deltoid                                   5                          5   Biceps                                   5                           5  Brachioradialis                      5                          5   Triceps                                  5                          5   Pronator                                5                          5   Supinator                              5                           5   Wrist flexor                            5                          5   Wrist extensor                       5                          5   Finger flexor                          5                          5   Finger extensor                     5                          5   Interossei                              5                          5   Abductor pollicis brevis         5                          5   Flexor pollicis brevis             5                          5   Opponens pollicis                 5                          5  Extensor digitorum               5                          5  Abductor digiti minimi           5                          5   Abdominal                            5                          5  Glutei                                    5                          5  Hip abductor                         5                          5  Hip adductor                         5                          5   Iliopsoas                               5                          5   Quadriceps                           5                          5   Hamstring                             5                          5   Gastrocnemius                     5                           5   Anterior tibialis                      5                          5   Posterior tibialis                    5                          5   Peroneal                               5                          5  Ankle dorsiflexor                   5                          5  Ankle plantar flexor              5                           5  Extensor hallucis longus      5                           5    Sensory  Light touch is normal  in upper and lower extremities. Proprioception is normal in upper and lower extremities.     Reflexes                                            Right                      Left  Brachioradialis                    2+                         2+  Biceps                                 2+                         2+  Triceps                                2+                         2+  Finger flex                           2+                         2+  Hamstring                            2+                         2+  Patellar                                2+                         2+  Achilles                                2+                         2+    Coordination    Finger-to-nose, rapid alternating movements and heel-to-shin normal bilaterally without dysmetria.    Gait  Casual gait is normal including stance, stride, and arm swing.Normal toe walking. Normal heel walking. Normal tandem gait.       Assessment & Plan   Independent Review of Radiographic Studies:      The thoracic MRI showed that the syrinx extends up to T8 to all the way down to T12 without significant cord expansion.  The x-rays show no dynamic instability with a grade 1 anterolisthesis at L3-L4 and some mild dextroscoliosis centered at L2-L3    Medical Decision Making:      I described and recommended an open revision lumbar decompression and fusion with posterior lumbar interbody fusion (PLIF) and open pedicle screw fixation with [employer] robotic navigation at L3-L4, L4-L5, L5-S1. The goal of surgery is relief of radiating leg pain, improvement of numbness, tingling, and weakness, and reduction in overall low back pain. The risks include, but are not limited to, infection, hemorrhage requiring transfusion or reoperation, CSF leak requiring reoperation, incomplete relief of symptoms, psuedoarthrosis resulting in chronic low back pain, hardward problems requiring revision or removal, potential need for additional surgery in the future, stroke,  paralysis, coma, and death. The patient agrees to proceed.     She will need inpatient rehabilitation.  She will be seen by an APC 2 weeks after surgery.  I like to see her at about the 3 to 4-month daisha with x-rays.       Diagnoses and all orders for this visit:    1. Spondylolisthesis at L3-L4 level (Primary)  -     CT Lumbar Spine Without Contrast; Future  -     Case Request; Standing  -     Basic metabolic panel; Future  -     Protime-INR; Future  -     ceFAZolin (ANCEF) 2 g in sodium chloride 0.9 % 100 mL IVPB  -     Case Request    2. Spinal stenosis of lumbar region with neurogenic claudication  -     CT Lumbar Spine Without Contrast; Future  -     Case Request; Standing  -     Basic metabolic panel; Future  -     Protime-INR; Future  -     ceFAZolin (ANCEF) 2 g in sodium chloride 0.9 % 100 mL IVPB  -     Case Request    3. History of lumbar surgery  -     CT Lumbar Spine Without Contrast; Future  -     Case Request; Standing  -     Basic metabolic panel; Future  -     Protime-INR; Future  -     ceFAZolin (ANCEF) 2 g in sodium chloride 0.9 % 100 mL IVPB  -     Case Request    4. Syringomyelia and syringobulbia    Other orders  -     Follow Anesthesia Guidelines / Protocol; Future  -     Follow Anesthesia Guidelines / Protocol; Standing  -     SCD (Sequential Compression Device) - To Be Placed on Patient in Pre-Op; Standing  -     Verify / Perform Chlorhexidine Skin Prep; Standing  -     Provide Patient With Instructions on NPO Status; Future  -     Provide Chlorhexidine Skin Prep Wipes and Instructions; Future      Return for 2 weeks after surgery with an APC.

## 2025-05-28 NOTE — H&P (VIEW-ONLY)
Subjective   Patient ID: Lou Shaffer is a 79 y.o. female is here today for follow-up after MRI.    History of Present Illness    This patient had a lumbar decompression from L3-S1 by another surgeon in 2008.  She did well for about 12 years until she began having recurrent bilateral buttock and leg pain that is failed to respond to epidural blocks by her pain physician Dr. Ireland.  The gabapentin I gave her caused side effects and did not help her so she stopped it.  She is not on oral pain medications.  She has neurogenic claudication involving both sides into her buttocks and down her legs, right worse than left.  She has no motor deficits.  But she has a poor quality of life because of her neurogenic claudication.  The syrinx is incidental and extends from T8-T12.  Will simply need to watch it.  I just wanted to know the true extent of it with the thoracic MRI.  She needs to be decompressed and fused from L3-S1 with cages and pedicle screws and rods.  The goals risks and benefits are described below.  She is generally healthy and I think she should tolerate this.  She understands that there is a risk of other problems in the future potentially requiring juxta level fusions.  The goals risks and benefits are described below.  She does not have any significant cardiac history.  If her PAT does not show any problems then we will need to get cardiology involved.    The following portions of the patient's history were reviewed and updated as appropriate: allergies, current medications, past family history, past medical history, past social history, past surgical history, and problem list.    Review of Systems   All other systems reviewed and are negative.      Objective   Physical Exam  Constitutional:       General: She is awake.      Appearance: She is well-developed.   HENT:      Head: Normocephalic and atraumatic.   Eyes:      General: Lids are normal.      Extraocular Movements: Extraocular movements intact.       Conjunctiva/sclera: Conjunctivae normal.      Pupils: Pupils are equal, round, and reactive to light.   Neck:      Vascular: No carotid bruit.   Neurological:      Mental Status: She is alert.      Coordination: Coordination is intact.      Deep Tendon Reflexes:      Reflex Scores:       Tricep reflexes are 2+ on the right side and 2+ on the left side.       Bicep reflexes are 2+ on the right side and 2+ on the left side.       Brachioradialis reflexes are 2+ on the right side and 2+ on the left side.       Patellar reflexes are 2+ on the right side and 2+ on the left side.       Achilles reflexes are 2+ on the right side and 2+ on the left side.  Psychiatric:         Speech: Speech normal.       Neurological Exam  Mental Status  Awake and alert. Oriented only to person, place, time and situation. Recent and remote memory are intact. Speech is normal. Language is fluent with no aphasia. Attention and concentration are normal. Fund of knowledge is appropriate for level of education.    Cranial Nerves  CN II: Visual acuity is normal. Visual fields full to confrontation.  CN III, IV, VI: Extraocular movements intact bilaterally. Normal lids and orbits bilaterally. Pupils equal round and reactive to light bilaterally.  CN V: Facial sensation is normal.  CN VII: Full and symmetric facial movement.  CN IX, X: Palate elevates symmetrically. Normal gag reflex.  CN XI: Shoulder shrug strength is normal.  CN XII: Tongue midline without atrophy or fasciculations.    Motor  Normal muscle bulk throughout. Normal muscle tone.                                               Right                     Left  Rhomboids                            5                          5  Infraspinatus                          5                          5  Supraspinatus                       5                          5  Deltoid                                   5                          5   Biceps                                   5                           5  Brachioradialis                      5                          5   Triceps                                  5                          5   Pronator                                5                          5   Supinator                              5                           5   Wrist flexor                            5                          5   Wrist extensor                       5                          5   Finger flexor                          5                          5   Finger extensor                     5                          5   Interossei                              5                          5   Abductor pollicis brevis         5                          5   Flexor pollicis brevis             5                          5   Opponens pollicis                 5                          5  Extensor digitorum               5                          5  Abductor digiti minimi           5                          5   Abdominal                            5                          5  Glutei                                    5                          5  Hip abductor                         5                          5  Hip adductor                         5                          5   Iliopsoas                               5                          5   Quadriceps                           5                          5   Hamstring                             5                          5   Gastrocnemius                     5                           5   Anterior tibialis                      5                          5   Posterior tibialis                    5                          5   Peroneal                               5                          5  Ankle dorsiflexor                   5                          5  Ankle plantar flexor              5                           5  Extensor hallucis longus      5                           5    Sensory  Light touch is normal  in upper and lower extremities. Proprioception is normal in upper and lower extremities.     Reflexes                                            Right                      Left  Brachioradialis                    2+                         2+  Biceps                                 2+                         2+  Triceps                                2+                         2+  Finger flex                           2+                         2+  Hamstring                            2+                         2+  Patellar                                2+                         2+  Achilles                                2+                         2+    Coordination    Finger-to-nose, rapid alternating movements and heel-to-shin normal bilaterally without dysmetria.    Gait  Casual gait is normal including stance, stride, and arm swing.Normal toe walking. Normal heel walking. Normal tandem gait.       Assessment & Plan   Independent Review of Radiographic Studies:      The thoracic MRI showed that the syrinx extends up to T8 to all the way down to T12 without significant cord expansion.  The x-rays show no dynamic instability with a grade 1 anterolisthesis at L3-L4 and some mild dextroscoliosis centered at L2-L3    Medical Decision Making:      I described and recommended an open revision lumbar decompression and fusion with posterior lumbar interbody fusion (PLIF) and open pedicle screw fixation with [employer] robotic navigation at L3-L4, L4-L5, L5-S1. The goal of surgery is relief of radiating leg pain, improvement of numbness, tingling, and weakness, and reduction in overall low back pain. The risks include, but are not limited to, infection, hemorrhage requiring transfusion or reoperation, CSF leak requiring reoperation, incomplete relief of symptoms, psuedoarthrosis resulting in chronic low back pain, hardward problems requiring revision or removal, potential need for additional surgery in the future, stroke,  paralysis, coma, and death. The patient agrees to proceed.     She will need inpatient rehabilitation.  She will be seen by an APC 2 weeks after surgery.  I like to see her at about the 3 to 4-month daisha with x-rays.       Diagnoses and all orders for this visit:    1. Spondylolisthesis at L3-L4 level (Primary)  -     CT Lumbar Spine Without Contrast; Future  -     Case Request; Standing  -     Basic metabolic panel; Future  -     Protime-INR; Future  -     ceFAZolin (ANCEF) 2 g in sodium chloride 0.9 % 100 mL IVPB  -     Case Request    2. Spinal stenosis of lumbar region with neurogenic claudication  -     CT Lumbar Spine Without Contrast; Future  -     Case Request; Standing  -     Basic metabolic panel; Future  -     Protime-INR; Future  -     ceFAZolin (ANCEF) 2 g in sodium chloride 0.9 % 100 mL IVPB  -     Case Request    3. History of lumbar surgery  -     CT Lumbar Spine Without Contrast; Future  -     Case Request; Standing  -     Basic metabolic panel; Future  -     Protime-INR; Future  -     ceFAZolin (ANCEF) 2 g in sodium chloride 0.9 % 100 mL IVPB  -     Case Request    4. Syringomyelia and syringobulbia    Other orders  -     Follow Anesthesia Guidelines / Protocol; Future  -     Follow Anesthesia Guidelines / Protocol; Standing  -     SCD (Sequential Compression Device) - To Be Placed on Patient in Pre-Op; Standing  -     Verify / Perform Chlorhexidine Skin Prep; Standing  -     Provide Patient With Instructions on NPO Status; Future  -     Provide Chlorhexidine Skin Prep Wipes and Instructions; Future      Return for 2 weeks after surgery with an APC.

## 2025-06-05 ENCOUNTER — CLINICAL SUPPORT (OUTPATIENT)
Dept: ONCOLOGY | Facility: HOSPITAL | Age: 80
End: 2025-06-05
Payer: MEDICARE

## 2025-06-05 ENCOUNTER — LAB (OUTPATIENT)
Dept: OTHER | Facility: HOSPITAL | Age: 80
End: 2025-06-05
Payer: MEDICARE

## 2025-06-05 DIAGNOSIS — D50.9 IRON DEFICIENCY ANEMIA, UNSPECIFIED IRON DEFICIENCY ANEMIA TYPE: ICD-10-CM

## 2025-06-05 DIAGNOSIS — T45.4X5A ADVERSE EFFECT OF IRON, INITIAL ENCOUNTER: ICD-10-CM

## 2025-06-05 LAB
BASOPHILS # BLD AUTO: 0.06 10*3/MM3 (ref 0–0.2)
BASOPHILS NFR BLD AUTO: 0.7 % (ref 0–1.5)
DEPRECATED RDW RBC AUTO: 68.8 FL (ref 37–54)
EOSINOPHIL # BLD AUTO: 0.52 10*3/MM3 (ref 0–0.4)
EOSINOPHIL NFR BLD AUTO: 6.4 % (ref 0.3–6.2)
ERYTHROCYTE [DISTWIDTH] IN BLOOD BY AUTOMATED COUNT: 24.1 % (ref 12.3–15.4)
HCT VFR BLD AUTO: 38.5 % (ref 34–46.6)
HGB BLD-MCNC: 12.5 G/DL (ref 12–15.9)
IMM GRANULOCYTES # BLD AUTO: 0.04 10*3/MM3 (ref 0–0.05)
IMM GRANULOCYTES NFR BLD AUTO: 0.5 % (ref 0–0.5)
LYMPHOCYTES # BLD AUTO: 1.48 10*3/MM3 (ref 0.7–3.1)
LYMPHOCYTES NFR BLD AUTO: 18.2 % (ref 19.6–45.3)
MCH RBC QN AUTO: 26.8 PG (ref 26.6–33)
MCHC RBC AUTO-ENTMCNC: 32.5 G/DL (ref 31.5–35.7)
MCV RBC AUTO: 82.6 FL (ref 79–97)
MONOCYTES # BLD AUTO: 0.73 10*3/MM3 (ref 0.1–0.9)
MONOCYTES NFR BLD AUTO: 9 % (ref 5–12)
NEUTROPHILS NFR BLD AUTO: 5.32 10*3/MM3 (ref 1.7–7)
NEUTROPHILS NFR BLD AUTO: 65.2 % (ref 42.7–76)
NRBC BLD AUTO-RTO: 0 /100 WBC (ref 0–0.2)
PLATELET # BLD AUTO: 383 10*3/MM3 (ref 140–450)
PMV BLD AUTO: 10.2 FL (ref 6–12)
RBC # BLD AUTO: 4.66 10*6/MM3 (ref 3.77–5.28)
WBC NRBC COR # BLD AUTO: 8.15 10*3/MM3 (ref 3.4–10.8)

## 2025-06-05 PROCEDURE — 36415 COLL VENOUS BLD VENIPUNCTURE: CPT

## 2025-06-05 PROCEDURE — 85025 COMPLETE CBC W/AUTO DIFF WBC: CPT | Performed by: NURSE PRACTITIONER

## 2025-06-05 NOTE — PROGRESS NOTES
Lou Shaffer is a 79 y.o. female with Anemia seen by Hematology/Oncology provider, Dr. Avitia, and is scheduled with Clinical Review offered by [facility]. Patient's visit was held via telephone today.     Therapy plan  Iron preparations as indicated     Allergies  Allergies   Allergen Reactions    Meperidine Nausea And Vomiting        Current Medication List  Medication Sig Start Date End Date Taking? Authorizing Provider   acetaminophen (TYLENOL) 500 MG tablet Take 1 tablet by mouth Every 6 (Six) Hours As Needed for Mild Pain. Indications: Pain 11/8/24   Jarad Leyva MD   ALPRAZolam (XANAX) 0.5 MG tablet Take 1 tablet by mouth Daily As Needed for Anxiety. Indications: Feeling Anxious 12/2/24   Juan C Parish Sr., MD   apixaban (ELIQUIS) 5 MG tablet tablet Take 1 tablet by mouth 2 (Two) Times a Day. Indications: DVT/PE (active thrombosis) 12/23/24   Steve Montez MD   baclofen (LIORESAL) 10 MG tablet TAKE 1 TABLET BY MOUTH TWICE  DAILY 1/30/25   Juan C Parish Sr., MD   benazepril (LOTENSIN) 5 MG tablet Take 1 tablet by mouth every night at bedtime. Indications: High Blood Pressure 1/29/25   Juan C Parish Sr., MD   Budesonide (ENTOCORT EC) 3 MG 24 hr capsule Take 1 capsule by mouth Daily. Indications: Collagenous Colitis 2/14/19   Jarad Leyva MD   cephalexin (KEFLEX) 500 MG capsule Take 4 tablets 1 hour prior to dental cleaning or procedure. 3/4/25   Steve Montez MD   Cholecalciferol (VITAMIN D-3) 1000 UNITS capsule Take 1,000 Units by mouth Daily. TO HOLD 1 WEEK BEFORE SURGERY    Indications: Vitamin D Deficiency 1/20/14   Jarad Leyva MD   docusate sodium (Colace) 100 MG capsule Take 1 capsule by mouth 2 (Two) Times a Day.  Patient taking differently: Take 1 capsule by mouth 2 (Two) Times a Day. 11/8/24   Steve Montez MD   fenofibrate 160 MG tablet TAKE 1 TABLET BY MOUTH DAILY 4/7/25   Juan C Parish Sr., MD   gabapentin (NEURONTIN) 100 MG capsule  Take 1 capsule p.o. nightly x 7 days then twice daily 4/2/25   León Madrid MD   HYDROcodone-acetaminophen (NORCO) 5-325 MG per tablet Take 1 tablet by mouth Every 6 (Six) Hours As Needed for Severe Pain. 12/24/24   Brigid Cali MD   HYDROcodone-acetaminophen (Norco) 5-325 MG per tablet Take 1 tablet by mouth Every 8 (Eight) Hours As Needed for Mild Pain or Moderate Pain. 1/7/25   Steve Montez MD   levothyroxine (SYNTHROID, LEVOTHROID) 75 MCG tablet TAKE 1 TABLET BY MOUTH DAILY 1/10/25   Juan C Parish Sr., MD   magnesium oxide (MAG-OX) 400 MG tablet Take 1 tablet by mouth 2 (two) times a day. TO HOLD 1 WEEK BEFORE SURGERY    Provider, MD Jarad   meclizine (ANTIVERT) 25 MG tablet Take 1 tablet by mouth 4 (Four) Times a Day.  Patient taking differently: Take 1 tablet by mouth 3 (Three) Times a Day As Needed. 9/12/24   Juan C Parish Sr., MD   ondansetron (Zofran) 4 MG tablet Take 1 tablet by mouth Every 8 (Eight) Hours As Needed for Nausea or Vomiting for up to 10 doses. Indications: Nausea and Vomiting Following an Operation 12/9/24   Steve Montez MD   pantoprazole (PROTONIX) 40 MG EC tablet TAKE 1 TABLET BY MOUTH TWICE  DAILY 11/4/24   Juan C Parish Sr., MD       Relevant Laboratory Values  Lab Results   Component Value Date    WBC 8.15 06/05/2025    RBC 4.66 06/05/2025    HGB 12.5 06/05/2025    HCT 38.5 06/05/2025    MCV 82.6 06/05/2025    MCH 26.8 06/05/2025    MCHC 32.5 06/05/2025    RDW 24.1 (H) 06/05/2025    RDWSD 68.8 (H) 06/05/2025    MPV 10.2 06/05/2025     06/05/2025    NEUTRORELPCT 65.2 06/05/2025    LYMPHORELPCT 18.2 (L) 06/05/2025    MONORELPCT 9.0 06/05/2025    EOSRELPCT 6.4 (H) 06/05/2025    BASORELPCT 0.7 06/05/2025    AUTOIGPER 0.5 06/05/2025    NEUTROABS 5.32 06/05/2025    LYMPHSABS 1.48 06/05/2025    MONOSABS 0.73 06/05/2025    EOSABS 0.52 (H) 06/05/2025    BASOSABS 0.06 06/05/2025    AUTOIGNUM 0.04 06/05/2025    NRBC 0.0 06/05/2025       Clinical Review  Patient  reports feeling well overall with no complaints at this time.    Hemoglobin 12.5      Patient most recently received Venofer 300 mg x3 4/11-4/25/25    The patient's current therapy plan and above labs have been reviewed.     Plan  CBC results reviewed with patient - see above in Laboratory Results  Will instruct Lou Shaffer to return for follow up in 1 month when scheduled with Dr. Avitia. Next scheduled appointment(s) reviewed with patient.  Patient is instructed to call the office with any concerns or new symptoms prior to next visit.  Verbal information provided. Patient expresses understanding and has no further questions at this time.            Valentina Welch, PharmD  Clinical Pharmacy Services   [facility]  6/5/2025  13:50 EDT

## 2025-06-09 ENCOUNTER — OFFICE VISIT (OUTPATIENT)
Dept: NEUROSURGERY | Facility: CLINIC | Age: 80
End: 2025-06-09
Payer: MEDICARE

## 2025-06-09 VITALS — SYSTOLIC BLOOD PRESSURE: 132 MMHG | HEART RATE: 71 BPM | DIASTOLIC BLOOD PRESSURE: 94 MMHG | OXYGEN SATURATION: 97 %

## 2025-06-09 DIAGNOSIS — Z98.890 HISTORY OF LUMBAR SURGERY: ICD-10-CM

## 2025-06-09 DIAGNOSIS — M48.062 SPINAL STENOSIS OF LUMBAR REGION WITH NEUROGENIC CLAUDICATION: ICD-10-CM

## 2025-06-09 DIAGNOSIS — M43.16 SPONDYLOLISTHESIS AT L3-L4 LEVEL: Primary | ICD-10-CM

## 2025-06-09 DIAGNOSIS — G95.0 SYRINGOMYELIA AND SYRINGOBULBIA: ICD-10-CM

## 2025-06-09 PROCEDURE — 99214 OFFICE O/P EST MOD 30 MIN: CPT | Performed by: NEUROLOGICAL SURGERY

## 2025-06-09 PROCEDURE — 1160F RVW MEDS BY RX/DR IN RCRD: CPT | Performed by: NEUROLOGICAL SURGERY

## 2025-06-09 PROCEDURE — 3075F SYST BP GE 130 - 139MM HG: CPT | Performed by: NEUROLOGICAL SURGERY

## 2025-06-09 PROCEDURE — 1159F MED LIST DOCD IN RCRD: CPT | Performed by: NEUROLOGICAL SURGERY

## 2025-06-09 PROCEDURE — 3080F DIAST BP >= 90 MM HG: CPT | Performed by: NEUROLOGICAL SURGERY

## 2025-06-09 RX ORDER — BENAZEPRIL HYDROCHLORIDE AND HYDROCHLOROTHIAZIDE 5; 6.25 MG/1; MG/1
TABLET ORAL
COMMUNITY
Start: 2025-04-17

## 2025-06-09 NOTE — LETTER
June 9, 2025     Juan C Parish Sr., MD  [address]  [address]  [facility] [address]    Patient: Lou Shaffer   YOB: 1945   Date of Visit: 6/9/2025     Dear Juan C Parish Sr., MD:       Thank you for referring Lou Shaffer to me for evaluation. Below are the relevant portions of my assessment and plan of care.    If you have questions, please do not hesitate to call me. I look forward to following Lou along with you.         Sincerely,        León Madrid MD        CC: Phil Ireland MD

## 2025-06-13 DIAGNOSIS — F41.9 ANXIETY: ICD-10-CM

## 2025-06-13 RX ORDER — ALPRAZOLAM 0.5 MG
0.5 TABLET ORAL DAILY PRN
Qty: 45 TABLET | Refills: 2 | Status: SHIPPED | OUTPATIENT
Start: 2025-06-13

## 2025-06-13 NOTE — TELEPHONE ENCOUNTER
Rx Refill Note  Requested Prescriptions     Pending Prescriptions Disp Refills    ALPRAZolam (XANAX) 0.5 MG tablet [Pharmacy Med Name: ALPRAZolam 0.5 MG TABLET] 45 tablet      Sig: TAKE 1 TABLET BY MOUTH DAILY AS NEEDED FOR ANXIETY      Last office visit with prescribing clinician: 3/7/2025   Last telemedicine visit with prescribing clinician: Visit date not found   Next office visit with prescribing clinician: 6/25/2025                         Would you like a call back once the refill request has been completed: [] Yes [] No    If the office needs to give you a call back, can they leave a voicemail: [] Yes [] No    Aleisha Ji MA  06/13/25, 11:00 EDT

## 2025-06-17 ENCOUNTER — PRE-ADMISSION TESTING (OUTPATIENT)
Dept: PREADMISSION TESTING | Facility: HOSPITAL | Age: 80
End: 2025-06-17
Payer: MEDICARE

## 2025-06-17 VITALS
HEART RATE: 78 BPM | OXYGEN SATURATION: 95 % | TEMPERATURE: 97 F | DIASTOLIC BLOOD PRESSURE: 91 MMHG | HEIGHT: 61 IN | WEIGHT: 123.1 LBS | BODY MASS INDEX: 23.24 KG/M2 | RESPIRATION RATE: 16 BRPM | SYSTOLIC BLOOD PRESSURE: 152 MMHG

## 2025-06-17 DIAGNOSIS — M43.16 SPONDYLOLISTHESIS AT L3-L4 LEVEL: ICD-10-CM

## 2025-06-17 DIAGNOSIS — Z98.890 HISTORY OF LUMBAR SURGERY: ICD-10-CM

## 2025-06-17 DIAGNOSIS — M48.062 SPINAL STENOSIS OF LUMBAR REGION WITH NEUROGENIC CLAUDICATION: ICD-10-CM

## 2025-06-17 LAB
ANION GAP SERPL CALCULATED.3IONS-SCNC: 10.5 MMOL/L (ref 5–15)
BUN SERPL-MCNC: 20 MG/DL (ref 8–23)
BUN/CREAT SERPL: 29.4 (ref 7–25)
CALCIUM SPEC-SCNC: 10 MG/DL (ref 8.6–10.5)
CHLORIDE SERPL-SCNC: 100 MMOL/L (ref 98–107)
CO2 SERPL-SCNC: 26.5 MMOL/L (ref 22–29)
CREAT SERPL-MCNC: 0.68 MG/DL (ref 0.57–1)
DEPRECATED RDW RBC AUTO: 68.3 FL (ref 37–54)
EGFRCR SERPLBLD CKD-EPI 2021: 88.2 ML/MIN/1.73
ERYTHROCYTE [DISTWIDTH] IN BLOOD BY AUTOMATED COUNT: 22.2 % (ref 12.3–15.4)
GLUCOSE SERPL-MCNC: 102 MG/DL (ref 65–99)
HCT VFR BLD AUTO: 38.5 % (ref 34–46.6)
HGB BLD-MCNC: 12.1 G/DL (ref 12–15.9)
INR PPP: 1.13 (ref 0.9–1.1)
MCH RBC QN AUTO: 27.9 PG (ref 26.6–33)
MCHC RBC AUTO-ENTMCNC: 31.4 G/DL (ref 31.5–35.7)
MCV RBC AUTO: 88.9 FL (ref 79–97)
PLATELET # BLD AUTO: 306 10*3/MM3 (ref 140–450)
PMV BLD AUTO: 9.5 FL (ref 6–12)
POTASSIUM SERPL-SCNC: 4 MMOL/L (ref 3.5–5.2)
PROTHROMBIN TIME: 14.4 SECONDS (ref 11.7–14.2)
QT INTERVAL: 397 MS
QTC INTERVAL: 413 MS
RBC # BLD AUTO: 4.33 10*6/MM3 (ref 3.77–5.28)
SODIUM SERPL-SCNC: 137 MMOL/L (ref 136–145)
WBC NRBC COR # BLD AUTO: 7.18 10*3/MM3 (ref 3.4–10.8)

## 2025-06-17 PROCEDURE — 36415 COLL VENOUS BLD VENIPUNCTURE: CPT

## 2025-06-17 PROCEDURE — 80048 BASIC METABOLIC PNL TOTAL CA: CPT

## 2025-06-17 PROCEDURE — 85027 COMPLETE CBC AUTOMATED: CPT

## 2025-06-17 PROCEDURE — 93005 ELECTROCARDIOGRAM TRACING: CPT

## 2025-06-17 PROCEDURE — 85610 PROTHROMBIN TIME: CPT

## 2025-06-17 RX ORDER — IBUPROFEN 400 MG/1
400 TABLET, FILM COATED ORAL EVERY 6 HOURS PRN
COMMUNITY

## 2025-06-17 NOTE — DISCHARGE INSTRUCTIONS
Take the following medications the morning of surgery:  PANTOPRAZOLE AND LEVOTHYROXINE      If you are on an Aspirin or a Blood Thinner please clarify with the surgeon and prescribing physician if and when you are to hold the medication or if you are to continue the medication.  If you are on prescription narcotic pain medication to control your pain you may also take that medication the morning of surgery.      General Instructions:     Do not eat solid food after midnight the night before surgery.  Clear liquids day of surgery are allowed but must be stopped at least two hours before your hospital arrival time.       Allowed clear liquids      Water, sodas, and tea or coffee with no cream or milk added.       12 to 20 ounces of a clear liquid that contains carbohydrates is recommended.  If non-diabetic, have Gatorade or Powerade.  If diabetic, have G2 or Powerade Zero.     Do not have liquids red in color.  Do not consume chicken, beef, pork or vegetable broth or bouillon cubes of any variety as they are not considered clear liquids and are not allowed.        Patients who avoid smoking, chewing tobacco and alcohol for 4 weeks prior to surgery have a reduced risk of post-operative complications.  Quit smoking as many days before surgery as you can.  Do not smoke, use chewing tobacco or drink alcohol the day of surgery.   If applicable bring your C-PAP/ BI-PAP machine in with you to preop day of surgery.  Bring any papers given to you in the doctor’s office.  Wear clean comfortable clothes.  Do not wear contact lenses, false eyelashes or make-up.  Bring a case for your glasses.   Bring crutches or walker if applicable.  Remove all piercings.  Leave jewelry and any other valuables at home.  Hair extensions with metal clips must be removed prior to surgery.  The Pre-Admission Testing nurse will instruct you to bring medications if unable to obtain an accurate list in Pre-Admission Testing.    Day of surgery you will  need to let the preoperative nurse know the last time you took each of your medications.  To ensure a safe environment for patients and staff, we kindly ask that children under the age of 16 not accompany patients.  If you must bring a dependent child or dependent adult please ensure a responsible adult, other than yourself, is present to supervise them.          Preventing a Surgical Site Infection:  For 2 to 3 days before surgery, avoid shaving with a razor because the razor can irritate skin and make it easier to develop an infection.    Any areas of open skin can increase the risk of a post-operative wound infection by allowing bacteria to enter and travel throughout the body.  Notify your surgeon if you have any skin wounds / rashes even if it is not near the expected surgical site.  The area will need assessed to determine if surgery should be delayed until it is healed.  The night prior to surgery shower using a fresh bar of anti-bacterial soap (such as Dial) and clean washcloth.  Sleep in a clean bed with clean clothing.  Do not allow pets to sleep with you.  Shower on the morning of surgery using a fresh bar of anti-bacterial soap (such as Dial) and clean washcloth.  Dry with a clean towel and dress in clean clothing.  Ask your surgeon if you will be receiving antibiotics prior to surgery.  Make sure you, your family, and all healthcare providers clean their hands with soap and water or an alcohol based hand  before caring for you or your wound.      CHLORHEXIDINE CLOTH INSTRUCTIONS  The morning of surgery follow these instructions using the Chlorhexidine cloths you've been given.  These steps reduce bacteria on the body.  Do not use the cloths near your eyes, ears mouth, genitalia or on open wounds.  Throw the cloths away after use but do not try to flush them down a toilet.      Open and remove one cloth at a time from the package.    Leave the cloth unfolded and begin the bathing.  Massage the  skin with the cloths using gentle pressure to remove bacteria.  Do not scrub harshly.   Follow the steps below with one 2% CHG cloth per area (6 total cloths).  One cloth for neck, shoulders and chest.  One cloth for both arms, hands, fingers and underarms (do underarms last).  One cloth for the abdomen followed by groin.  One cloth for right leg and foot including between the toes.  One cloth for left leg and foot including between the toes.  The last cloth is to be used for the back of the neck, back and buttocks.    Allow the CHG to air dry 3 minutes on the skin which will give it time to work and decrease the chance of irritation.  The skin may feel sticky until it is dry.  Do not rinse with water or any other liquid or you will lose the beneficial effects of the CHG.  If mild skin irritation occurs, do rinse the skin to remove the CHG.  Report this to the nurse at time of admission.  Do not apply lotions, creams, ointments, deodorants or perfumes after using the clothes. Dress in clean clothes before coming to the hospital.        Day of surgery:  Your arrival time is approximately two hours before your scheduled surgery time.  Please note if you have an early arrival time the surgery doors do not open before 5:00 AM.  Upon arrival, a Pre-op nurse and Anesthesiologist will review your health history, obtain vital signs, and answer questions you may have.  The only belongings needed at this time will be a list of your home medications and if applicable your C-PAP/BI-PAP machine.  A Pre-op nurse will start an IV and you may receive medication in preparation for surgery, including something to help you relax.     Please be aware that surgery does come with discomfort.  We want to make every effort to control your discomfort so please discuss any uncontrolled symptoms with your nurse.   Your doctor will most likely have prescribed pain medications.      If you are going home after surgery you will receive  individualized written care instructions before being discharged.  A responsible adult must drive you to and from the hospital on the day of your surgery and ideally stay with you through the night.   .  Discharge prescriptions can be filled by the hospital pharmacy during regular pharmacy hours.  If you are having surgery late in the day/evening your prescription may be e-prescribed to your pharmacy.  Please verify your pharmacy hours or chose a 24 hour pharmacy to avoid not having access to your prescription because your pharmacy has closed for the day.    If you are staying overnight following surgery, you will be transported to your hospital room following the recovery period.  [facility] has all private rooms.    If you have any questions please call Pre-Admission Testing at [phone].  Deductibles and co-payments are collected on the day of service. Please be prepared to pay the required co-pay, deductible or deposit on the day of service as defined by your plan.    Call your surgeon immediately if you experience any of the following symptoms:  Sore Throat  Shortness of Breath or difficulty breathing  Cough  Chills  Body soreness or muscle pain  Headache  Fever  New loss of taste or smell  Do not arrive for your surgery ill.  Your procedure will need to be rescheduled to another time.  You will need to call your physician before the day of surgery to avoid any unnecessary exposure to hospital staff as well as other patients.

## 2025-06-19 ENCOUNTER — HOSPITAL ENCOUNTER (OUTPATIENT)
Dept: CT IMAGING | Facility: HOSPITAL | Age: 80
Discharge: HOME | End: 2025-06-19
Admitting: NEUROLOGICAL SURGERY
Payer: MEDICARE

## 2025-06-19 DIAGNOSIS — M48.062 SPINAL STENOSIS OF LUMBAR REGION WITH NEUROGENIC CLAUDICATION: ICD-10-CM

## 2025-06-19 DIAGNOSIS — Z98.890 HISTORY OF LUMBAR SURGERY: ICD-10-CM

## 2025-06-19 DIAGNOSIS — M43.16 SPONDYLOLISTHESIS AT L3-L4 LEVEL: ICD-10-CM

## 2025-06-19 PROCEDURE — 72131 CT LUMBAR SPINE W/O DYE: CPT

## 2025-06-24 NOTE — CASE MANAGEMENT/SOCIAL WORK
Discharge Planning Assessment  [facility]     Patient Name: Lou Shaffer  MRN: [id number]  Today's Date: 2025    Admit Date: 2025        Discharge Needs Assessment    No documentation.                  Discharge Plan       Row Name 25 [id number]       Plan    Plan Comments Call received from  [place] advising that patient is  and requesting assistance with reaching next of kin (spouse) whom she believes the patient is primary caregiver for; reviewed pt and spouses (next of kin ) record- no history of dementia/cognitive issues w/spouse; call placed to spouse- verified that he has spoken with the MD and is aware of status- this CCP asked if he is able to drive- spouse noted he brought patient to the hospital and drove home, yes he can drive; in addition he noted he is independent w/ADL's- informed spouse of next steps including staff notification to the , PCP, KATHLEEN,  home, etc. Spouse noted patient wouldnt want KATHLEEN or for him to see her in this state; He also advised he would like the patient taken to [facility]; Updating  [place];                       Demographic Summary    No documentation.                  Functional Status    No documentation.                  Psychosocial    No documentation.                  Abuse/Neglect    No documentation.                  Legal    No documentation.                  Substance Abuse    No documentation.                  Patient Forms    No documentation.                     Tisha Rader RN

## 2025-06-24 NOTE — DISCHARGE SUMMARY
Death Summary:    Date of Admission: 6/24/25    Date of Death: 6/24/25    Time of death: 3:02 PM    Cause of death: unclear, but probable pulmonary embolus    Hospital course: The patient is an 79-year-old healthy female who I brought to the hospital today for an elective lumbar decompression and fusion from L3-S1.  She had no cardiac history prior to this surgery and had been living with her .  She had a previous lumbar decompression in 2008 but developed recurrent symptoms of bilateral neurogenic claudication that were debilitating and that prevented her from standing and walking for any appreciable amount of time.  Her preadmission testing results were unremarkable.  Surgery itself was unremarkable as reflected in my operative note.  At the very end of the closure we were notified by anesthesia that her oxygen saturations and her blood pressure dramatically deteriorated to the point where she had no pulse.  She was then quickly rolled into the supine position and resuscitative measures as well as CPR were initiated by anesthesia.  The events during the code will be reflected in their procedural notes.  Chest compressions began at 1439.  We continued the code until 1502 when the consensus of the team including myself was that we should stop.  We presumed based on the clinical scenario that she probably had a large saddle embolus during surgery.  I was on the telephone with her  who was at their home residence at the time.  I kept him apprised of the events as they occured.  I also left a message for the adult son who lives in [place] by voicemail.  As of this dictation I have not heard from him.  Again, death was declared by anesthesia at 1502.

## 2025-06-24 NOTE — ANESTHESIA PREPROCEDURE EVALUATION
Anesthesia Evaluation     NPO Solid Status: > 8 hours             Airway   Mallampati: II  TM distance: >3 FB  Neck ROM: full  No difficulty expected  Dental - normal exam     Pulmonary    (-) wheezes  Cardiovascular     ECG reviewed  Rhythm: regular    (+) hypertension, DVT, hyperlipidemia  (-) murmur, carotid bruits    ROS comment: Inferior Q present 10 y ago at that time negative perfusion scan    Neuro/Psych  (+) headaches, dizziness/light headedness, numbness, psychiatric history  GI/Hepatic/Renal/Endo      Musculoskeletal     Abdominal    Substance History      OB/GYN          Other                    Anesthesia Plan    ASA 2     general     (  D/W R&B of [place] including but not limited to: heart, lung, liver, kidney, neurologic problems, positioning injuries, dental damage, corneal abrasion and TMJ.  .)  intravenous induction     Anesthetic plan, risks, benefits, and alternatives have been provided, discussed and informed consent has been obtained with: patient.    CODE STATUS:

## 2025-06-24 NOTE — ANESTHESIA PROCEDURE NOTES
Airway  Reason: elective    Date/Time: 6/24/2025 8:16 AM    General Information and Staff    Patient location during procedure: OR  Anesthesiologist: Indra Neal MD  CRNA/CAA: Tisha Carroll CRNA    Indications and Patient Condition    Preoxygenated: yes    Mask difficulty assessment: 1 - vent by mask    Final Airway Details    Final airway type: endotracheal airway      Cuffed: yes   Successful intubation technique: video laryngoscopy  Adjuncts used in placement: intubating stylet  Endotracheal tube insertion site: oral  Blade: Priscilla  Blade size: 3  Cormack-Lehane Classification: grade I - full view of glottis  Placement verified by: chest auscultation and capnometry   Measured from: lips  Number of attempts at approach: 1  Assessment: lips, teeth, and gum same as pre-op and atraumatic intubation

## 2025-06-24 NOTE — PROGRESS NOTES
Mr. Shaffer was undergoing a three-level lumbar decompression today.  She was healthy for her stated age with no known cardiac or pulmonary conditions.    Surgery had been proceeding uneventfully.  At 920 a low background infusion of phenylephrine was used to help maintain blood pressure.  This was intermittently titrated both up and down depending on her pressure.  At 1432 I was called to the room regarding a precipitous decrease in end-tidal CO2.  A low blood pressure was also recorded at this time as well.  A bolus of phenylephrine was given to which there seem to be response.  When I arrived I had difficulty palpating a radial pulse or carotid pulse.  Positioning in the prone position did make this somewhat difficult.  Her end-tidal CO2 and oxygen saturations were not functioning at this time.  Her ECG showed a narrow complex sinus rhythm.    Hand ventilation was performed with the bag on the anesthesia machine and there was no difficulty ventilating.  I called for help from other anesthesia providers and informed  the surgical assistant that we needed to place the patient supine and start CPR.  She expeditiously closed the skin in approximately 30 seconds and pulled the drapes back.  We brought the bed in and placed a backboard on the bed and turned her supine.  I checked both carotid and femoral pulses and they were not present.  We immediately began CPR.  There was a very mild increase in the end-tidal CO2 with CPR but it rarely got above 6 or 8.  At this time the cardiac rhythm was a narrow complex sinus rhythm.  With no obvious ST changes.  We administered an initial dose of epinephrine and continued with CPR.  After approximately 3 minutes we did a pulse check which was again negative.  CPR was continued with multiple providers administering adequate CPR and a total of 7 rounds of epinephrine were administered approximately every 3 to 5 minutes.  Approximately every 5 minutes pulse check was performed.   The QRS complex gradually widened and amplitude declined.  During the course of CPR she was also given 1 g of calcium chloride.  40 units of vasopressin and 0.8 mg of atropine.  Ventilation was performed by hand through the bag on the anesthesia machine throughout CPR.  Throughout CPR pulse checks were also attempted using ultrasound at the femoral artery.  After approximately 30 minutes of CPR I conferred with the surgeon in light of the fact that she was in PEA without any response to CPR or epinephrine we elected to cease resuscitation.

## 2025-06-24 NOTE — BRIEF OP NOTE
LUMBAR FUSION POSTERIOR AND INSTRUMENTATION WITH NEURO ROBOT 3-4 LEVELS  Progress Note    Lou Shaffer  6/24/2025    Pre-op Diagnosis:   Spondylolisthesis at L3-L4 level [M43.16]  Spinal stenosis of lumbar region with neurogenic claudication [M48.062]  History of lumbar surgery [Z98.890]       Post-Op Diagnosis Codes:     * Spondylolisthesis at L3-L4 level [M43.16]     * Spinal stenosis of lumbar region with neurogenic claudication [M48.062]     * History of lumbar surgery [Z98.890]    Procedure(s):      Procedure(s):  Open revision lumbar three/four, lumbar four/five, lumbar five/sacral one decompression/discectomy/posterior lumbar interbody fusion with cages, pedicle screw/wilner/crosslink fixation with Mazor robotic navigation              Surgeon(s):  León Madrid MD    Anesthesia: General    Staff:   Cell Saver : Ravi Banks  Circulator: Dary Marino RN; Fabienne Sheehan RN  Radiology Technologist: Ashlee Palmer  Scrub Person: Phyllis Vizcarra Katherine  Vendor Representative: Caleb Dickinson Tony  Assistant: Asiya Ochoa CSA; Stephanie Doe CSA  Assistant: Asiya Ochoa CSA; Stephanie Doe CSA    Estimated Blood Loss: 1000 mL    Urine Voided: 800 mL    Specimens:                None      Drains:   Urethral Catheter Straight-tip;Silicone 16 Fr. (Active)       Findings: severe recurrent stenosis at all levels on both sides      Complications: probable pulmonary embolus leading to death    Assistant: Asiya Ochoa CSA; Stephanie Doe CSA  was responsible for performing the following activities: Retraction, Suction, Irrigation, Suturing, Closing, and Placing Dressing and their skilled assistance was necessary for the success of this case.    León Madrid MD     Date: 6/24/2025  Time: 14:37 EDT

## 2025-06-24 NOTE — OP NOTE
Preoperative diagnosis: 1. Spinal stenosis L3/4, L4/5, L5/S1 with bilateral neurogenic claudication; 2. Degenerative spondylolisthesis L3/4, L5/S1    Postoperative diagnosis: Same as above    Procedures performed: Open revision L3/4, L4/5, L5/S1 decompression, posterior lumbar interbody fusion with bilateral expandable Catalyft cages at all levels, pedicle screw/wilner/crosslink fixation L3/4, L4/5, L5/S1 with [employer] robotic navigation, posterolateral onlay fusion L3 to S1 with autograft, DBM, and Magnetos    Spinal Surgery Levels Completed:3 Levels     Surgeon: Julius    First Assistant: Stephanie Doe  (She greatly assisted in the exposure, visualization of neural structures, control of bleeding, retraction, and closure of the incision. Her skilled assistance was necessary for the success of this case.)     Anesthesia: GET    EBL: 1000 cc    Complications: probable pulmonary embolus resulting in death    Specimen sent: none    Drains: 15 F epidural Hemovac drain    Findings: Severe stenosis bilaterally with bilateral root compression    Postoperative condition: Death    Indications for the operation: The patient is a 79-year-old female who had a previous history of an L4-L5 decompression in 2008 by another neurosurgeon.  She is generally healthy but over the last 2 or 3 years has developed debilitating bilateral neurogenic claudication as a result of stenosis at L3-L4, L4-L5 and L5-S1 with severe nerve root compression and a spondylolisthesis at L3-L4 and L5-S1.  She failed conservative treatment which included epidural blocks, physical therapy and medicines.  Felt that her quality of life because of her inability to ambulate had deteriorated to the point where she would undergo a multilevel decompression and fusion from L3-S1.  She had no cardiac history prior to surgery admission testing was unremarkable.  We brought her to the operating room today for three-level lumbar decompression and fusion.    Informed  consent: She understood that the goal of surgery is relief of radiating leg pain, improvement of numbness, tingling, and weakness, and reduction in overall low back pain. The risks include, but are not limited to, infection, hemorrhage requiring transfusion or reoperation, CSF leak requiring reoperation, incomplete relief of symptoms, psuedoarthrosis resulting in chronic low back pain, hardward problems requiring revision or removal, potential need for additional surgery in the future, stroke, paralysis, coma, and death. The patient agrees to proceed.      Details of the operation: The patient was taken the operating room and remained in her gurney in the supine position.  After induction and endotracheal intubation, she got 2 g of Kefzol as per the SCIP protocol which was repeated during the case.  A Ventura catheter was placed.  SCDs were placed.  Venous and arterial access was secured.  She was rolled over in the prone position on a radiolucent Evelio spinal table.  All pressure points were padded include the brachial plexus.  We brought in the CMR marked out the position of the left PSIS as well as the incision from L3-S1 in the midline.  The lumbar region was then prepped and draped in the usual sterile fashion.  We did a surgical timeout.  We injected a total of 60 cc of quarter percent Marcaine with epinephrine into the paraspinous musculature from L3-S1 as well as into the left PSIS.  I made a stab incision over the left PSIS and put a small stereotactic pin for use of the [employer] robot labor.  A linear incision was made from L3-S1 with a #10 skin knife.  Hemostasis was obtained with monopolar cautery.  Dissection was taken all the way down to the lumbar fascia which was divided to the left and to the right from L3-S1.  Using monopolar cautery in a technique of subperiosteal dissection I exposed the lateral facets at L3-L4, L4-L5 and L5-S1 bilaterally as well as the transverse processes at L3-L4-L5 and S1.  We  then connected the patient to the [employer] robot and did our CT to fluoroscopy registration pictures with excellent accuracy.  Using the robot and the transpedicular technique I placed a pair of 6.5 x 45 mm screws at L3, a pair of 6.5 x 45 mm screws at L4, pair of 6.5 x 45 mm screws at L5, and a pair of 6.5 x 45 mm screws at S1 again with angulations about 7 degrees.  Post placement x-rays show good placement of the screws in the vertebral bodies.  The [employer] robot was attached from the patient and the stereotactic pin was removed.  The microscope was draped and brought into the field.  Its use was essential to performance of micro neurosurgical technique.  Using a Leksell rongeur I remove the spinous processes at L3-L4-L5 and S1.  Using a 5 mm round bur I did a central laminectomy all the way down to the ligamentum flavum centrally at L3-L4, L4-L5 and L5-S1.  Switching over to a 3 mm matchstick I completed the central laminectomy and did complete facetectomies and foraminotomies bilaterally at L3-L4 L4-L5 and L5-S1 decompressing the L4-L5 and S1 roots respectively.  Using lateral C-arm guidance I incised the disc spaces at L3-L4, L4-L5 and L5-S1 bilaterally and did near complete discectomies at all 3 of those levels using endplate scrapers, reverse-angle curettes, ring curettes, punches, pituitaries in the drill itself.  I measured out for a pair of 9.0 x 23 mm expandable Catalyft cages for use at L3-L4, and 2 pairs of expandable 7 x 23 mm Catalyft cages to be used at L4-L5 and L5-S1.  Laminar autograft and demineralized bone matrix were placed in the anterior interspaces at all 3 levels and then the cages were placed with C-arm guidance with the appropriate amount of recession.  100 mm rods were then placed and tightened down from L3-S1 and compression and a cross-link was placed between L4-L5.  Hemostasis was obtained with Floseal and thrombin-soaked Gelfoam.  The area had been irrigated with antibiotic irrigation  and Xperience irrigation.  A 10 Omani Hemovac drain was placed in the epidural space and exit through separate stab incision secured to skin with 2-0 silk.  The fascia was reapproximated with 0 Vicryl interrupted suture.  Subcutaneous layer was closed with 2-0 interrupted Vicryl suture.  The skin was closed with running 4-0 Vicryl subcuticular.  At the very end of the closure we were notified by anesthesia that the patient's oxygen saturation had plummeted dramatically and her blood pressure also dramatically dropped.  It appeared that she was in PEA or pulseless electrical activity.  At this point we flipped her into the supine position and resusitative measures were begun by the anesthesia team which was dictated in their notes.  CPR was initiated.  The rhythm again was consistent with PEA but with no pulse.  Discussions with anesthesia, we felt the most probable reason for the code was a large pulmonary embolus.  Multiple rounds of epinephrine were used as well as bicarb.  There was no response.  She remained pulseless. I was on the phone with the  who was not in the hospital but was at their residence.  I kept him apprised of the events.  Chest compressions started at 1439.  We ended the code and death was declared at 1502.  Subsequent to this I notified the  of the events and to express  my sympathies.  I left a message with the patient's son and have not yet heard from him.

## 2025-06-26 NOTE — ANESTHESIA POSTPROCEDURE EVALUATION
Patient: Lou Shaffer    Procedure Summary       Date: 25 Room / Location: [facility] OR  / [facility] MAIN OR    Anesthesia Start: 810 Anesthesia Stop:     Procedure: Open revision lumbar three/four, lumbar four/five, lumbar five/sacral one decompression/discectomy/posterior lumbar interbody fusion with cages, pedicle screw/wilner/crosslink fixation with [employer] robotic navigation (Bilateral: Spine Lumbar) Diagnosis:       Spondylolisthesis at L3-L4 level      Spinal stenosis of lumbar region with neurogenic claudication      History of lumbar surgery      (Spondylolisthesis at L3-L4 level [M43.16])      (Spinal stenosis of lumbar region with neurogenic claudication [M48.062])      (History of lumbar surgery [Z98.890])    Surgeons: León Madrid MD Provider: Indra Neal MD    Anesthesia Type: general ASA Status: 2            Anesthesia Type: general    Vitals  Vitals Value Taken Time   /79 25 14:53   Temp     Pulse 0 25 15:01   Resp 37 25 14:55   SpO2 71 % 25 15:01           Post Anesthesia Care and Evaluation      Comments: Pt  in operating room

## (undated) DEVICE — PK NEURO SPINE 40

## (undated) DEVICE — TOOL MR8-14MH30 MR8 14CM MATCH 3MM: Brand: MIDAS REX MR8

## (undated) DEVICE — DRSNG SURESITE WNDW 2.38X2.75

## (undated) DEVICE — ANTIBACTERIAL UNDYED BRAIDED (POLYGLACTIN 910), SYNTHETIC ABSORBABLE SUTURE: Brand: COATED VICRYL

## (undated) DEVICE — THE STERILE LIGHT HANDLE COVER IS USED WITH STERIS SURGICAL LIGHTING AND VISUALIZATION SYSTEMS.

## (undated) DEVICE — TOOL MR8-14BA50C MR8 14CM BALL CARB 5MM: Brand: MIDAS REX MR8

## (undated) DEVICE — TOOL MR8-31TD3030 MR8 TWST DRIL 3MMX30MM: Brand: MIDAS REX

## (undated) DEVICE — PK ATS CUST W CARDIOTOMY RESEVOIR

## (undated) DEVICE — SYR CONTRL LUERLOK 10CC

## (undated) DEVICE — DRSNG WND GEL FIBR OPTICELL AG PLS W/SLV LF 4X5IN  STRL

## (undated) DEVICE — BLD DEBAKY BEAVER MAMMATOME 8MM

## (undated) DEVICE — STRIP CLS WND CURAD MEDI/STRIP HYPOALLERG 1X5IN STRL EA/PK/4

## (undated) DEVICE — MARKR SKIN W/RULR 2TP

## (undated) DEVICE — NEEDLE, QUINCKE 22GX3.5": Brand: MEDLINE INDUSTRIES, INC.

## (undated) DEVICE — GLV SURG SENSICARE PI LF PF 8 GRN STRL

## (undated) DEVICE — TRAP FLD MINIVAC MEGADYNE 100ML

## (undated) DEVICE — PCH INST SURG INVISISHIELD 2PCKT

## (undated) DEVICE — PK KN TOTL 40

## (undated) DEVICE — DISPOSABLE BIPOLAR FORCEPS 7 3/4" (19.7CM) SCOVILLE BAYONET, 1.5MM TIP AND 12 FT. (3.6M) CABLE: Brand: KIRWAN

## (undated) DEVICE — 450 ML BOTTLE OF 0.05% CHLORHEXIDINE GLUCONATE IN 99.95% STERILE WATER FOR IRRIGATION, USP AND APPLICATOR.: Brand: IRRISEPT ANTIMICROBIAL WOUND LAVAGE

## (undated) DEVICE — PREP SOL POVIDONE/IODINE BT 4OZ

## (undated) DEVICE — SEALANT WND FIBRIN TISSEEL PREFIL/SYR/PRIMAFZ 4ML

## (undated) DEVICE — GOWN,SIRUS,NONRNF,SETINSLV,2XL,18/CS: Brand: MEDLINE

## (undated) DEVICE — SUT VIC 4/0 P3 18IN J494G

## (undated) DEVICE — SKIN PREP TRAY 4 COMPARTM TRAY: Brand: MEDLINE INDUSTRIES, INC.

## (undated) DEVICE — SYS CLS SKIN PREMIERPRO EXOFINFUSION 22CM

## (undated) DEVICE — KT SPINE MAZORX DISP

## (undated) DEVICE — DRAPE,CHEST,FENES,15X10,STERIL: Brand: MEDLINE

## (undated) DEVICE — UNDERCAST PADDING: Brand: DEROYAL

## (undated) DEVICE — 1LYRTR 16FR10ML100%SIL UMS SNP: Brand: MEDLINE INDUSTRIES, INC.

## (undated) DEVICE — 3M™ IOBAN™ 2 ANTIMICROBIAL INCISE DRAPE 6640EZ: Brand: IOBAN™ 2

## (undated) DEVICE — GLV SURG BIOGEL LTX PF 8

## (undated) DEVICE — HANDPIECE SET WITH COAXIAL HIGH FLOW TIP AND SUCTION TUBE: Brand: INTERPULSE

## (undated) DEVICE — DECANTER BAG 9": Brand: MEDLINE INDUSTRIES, INC.

## (undated) DEVICE — INTENDED TO SUPPORT AND MAINTAIN THE POSITION OF AN ANESTHETIZED PATIENT DURING SURGERY: Brand: HERMANTOR XL PINK KNEE POSITIONING PAD

## (undated) DEVICE — DUAL CUT SAGITTAL BLADE

## (undated) DEVICE — CEMENT MIXING SYSTEM WITH FEMORAL BREAKWAY NOZZLE: Brand: REVOLUTION

## (undated) DEVICE — SYS IRR SURGIPHOR A/MIC RTU BO PVPI 450ML STRL

## (undated) DEVICE — SPNG GZ WOVN 4X4IN 12PLY 10/BX STRL

## (undated) DEVICE — COVER,C-ARM,41X74: Brand: MEDLINE

## (undated) DEVICE — SPHR MARKR STEALTH STATION

## (undated) DEVICE — DRAPE,REIN 53X77,STERILE: Brand: MEDLINE

## (undated) DEVICE — APPL DURAPREP IODOPHOR APL 26ML

## (undated) DEVICE — TOWEL,OR,DSP,ST,BLUE,STD,4/PK,20PK/CS: Brand: MEDLINE

## (undated) DEVICE — GLV SURG PREMIERPRO ORTHO LTX PF SZ8 BRN

## (undated) DEVICE — YANKAUER,BULB TIP,W/O VENT,RIGID,STERILE: Brand: MEDLINE

## (undated) DEVICE — GLV SURG SENSICARE PI MIC PF SZ6.5 LF STRL

## (undated) DEVICE — GLV SURG SENSICARE POLYISPRN W/ALOE PF LF 6.5 GRN STRL

## (undated) DEVICE — BG TRANSF W/COUPLER SPK 600ML

## (undated) DEVICE — ELECTRD BLD EZ CLN STD 4IN

## (undated) DEVICE — SOL ISO/ALC 70PCT 4OZ

## (undated) DEVICE — SOL IRR NACL 0.9PCT 3000ML

## (undated) DEVICE — SPONGE,NEURO,0.5"X0.5",XR,STRL,10/PK: Brand: MEDLINE

## (undated) DEVICE — DRP MICROSCP LEICA 137X381CM

## (undated) DEVICE — CONN TBG Y 5 IN 1 LF STRL

## (undated) DEVICE — BNDG ADHS CURAD FLX/FABRC 2X4IN STRL LF

## (undated) DEVICE — PATIENT RETURN ELECTRODE, SINGLE-USE, CONTACT QUALITY MONITORING, ADULT, WITH 9FT CORD, FOR PATIENTS WEIGING OVER 33LBS. (15KG): Brand: MEGADYNE

## (undated) DEVICE — DEV CONTRL TISS STRATAFIX SYMM PDS PLUS VIL CT-1 60CM

## (undated) DEVICE — MAT FLR ABSORBENT LG 4FT 10 2.5FT

## (undated) DEVICE — BNDG,ELSTC,MATRIX,STRL,6"X5YD,LF,HOOK&LP: Brand: MEDLINE

## (undated) DEVICE — Device: Brand: XPERIENCE

## (undated) DEVICE — CMT BONE PALACOS R HI/VISC 1X40

## (undated) DEVICE — 3M™ IOBAN™ 2 ANTIMICROBIAL INCISE DRAPE 6650EZ: Brand: IOBAN™ 2

## (undated) DEVICE — SUT VIC 0 CT1 CR8 27IN UD VCPP41D

## (undated) DEVICE — APPL CHLORAPREP HI/LITE 26ML ORNG

## (undated) DEVICE — DEV CONTRL TISS STRATAFIXSPIRALMNCRYL PLSPS2 REV3/0 45CM

## (undated) DEVICE — PICO 7 10CM X 30CM: Brand: PICO™ 7